# Patient Record
Sex: FEMALE | Race: WHITE | Employment: OTHER | URBAN - METROPOLITAN AREA
[De-identification: names, ages, dates, MRNs, and addresses within clinical notes are randomized per-mention and may not be internally consistent; named-entity substitution may affect disease eponyms.]

---

## 2020-06-01 ENCOUNTER — HOSPITAL ENCOUNTER (OUTPATIENT)
Dept: PHYSICAL THERAPY | Age: 75
Discharge: HOME | End: 2020-06-01
Payer: COMMERCIAL

## 2020-06-01 DIAGNOSIS — S93.402A SPRAIN OF LEFT ANKLE, UNSPECIFIED LIGAMENT, INITIAL ENCOUNTER: ICD-10-CM

## 2020-06-01 DIAGNOSIS — S83.412A SPRAIN OF MEDIAL COLLATERAL LIGAMENT OF LEFT KNEE, INITIAL ENCOUNTER: ICD-10-CM

## 2020-06-01 DIAGNOSIS — S16.1XXA STRAIN OF NECK MUSCLE, INITIAL ENCOUNTER: ICD-10-CM

## 2020-06-01 PROCEDURE — 97112 NEUROMUSCULAR REEDUCATION: CPT

## 2020-06-01 PROCEDURE — 97110 THERAPEUTIC EXERCISES: CPT

## 2020-06-01 PROCEDURE — 97162 PT EVAL MOD COMPLEX 30 MIN: CPT

## 2020-06-01 NOTE — PROGRESS NOTES
Tammie Geneva  : 1945  [facility]  Secondary:  [address] [facility] @ [address] [place] [address].  [phone]   [phone]      OUTPATIENT PHYSICAL THERAPY: Daily Treatment Note 2020  Visit Count:  1    ICD-10: Treatment Diagnosis: Pain in left wrist (M25.532) and Stiffness of left wrist, not elsewhere classified (M25.632), Pain in left knee (M25.562) and Pain in left ankle and joints of left foot (M25.572) and Cervicalgia (M54.2) and Stiffness of unspecified joint, not elsewhere classified (M25.60)  TREATMENT PLAN:  Effective Dates: 2020 TO 2020 (30 days). Frequency/Duration: 2 times a week for 30 Day(s)  GOALS: (Goals have been discussed and agreed upon with patient.)  Short-Term Functional Goals: Time Frame: 2 weeks  # Goal Status   1 Pt will confirm compliance with home program to facilitate improvement in function. NEW     Discharge goals: Time frame: 4 weeks  # Goal Status   1 Pt will be able to tolerate sitting for at least 1 hour with no significant increase in symptoms to participate in ADLs such as desk work and reading. NEW   2 Pt will be able to perform sit to stand transfer without symptoms to be more independent with mobility. NEW   3 Pt will be able to ascend/descend 1 flight of stairs without significant increase in symptoms to be independent with household and community mobility. NEW   4 Pt will be able to pick objects up from ground without significant increase in symptoms to be able to perform ADLs such as clean. NEW       Pre-treatment Symptoms/Complaints:  Pt reports she feels ok right now because she took her medication before coming in and has been moving around. Pain: Initial:   0/10 Post Session:  0/10   Medications Last Reviewed: 2020  Updated Objective Findings: Below measures from initial evaluation unless otherwise noted.   20  Pain at worst: 7/10  Observation: Edema visible effecting L medial knee. ROM: L ankle and knee [facility]. L cervical rotation limited to 30 ° due to L sided pain and R rotation limited to 50 ° due to L sided pain. Lower body ANDT: All tests WNL B  Strength: Pt requires UEs for STS but is able to perform several SL calf raises on L with some discomfort. She has difficulty with rolling in bed. Palpation: No TTP in L ankle, knee, and neck currently. NDI: 12/45    TREATMENT:   THERAPEUTIC ACTIVITY: (see chart for minutes): Therapeutic activities per grid below to improve mobility, strength, balance and coordination. Required minimal visual, verbal and tactile cues to improve independence with functional mobility and activities. THERAPEUTIC EXERCISE: (see chart for minutes):  Exercises per grid below to improve mobility, strength, balance and coordination. Required minimal visual, verbal and tactile cues to promote proper body alignment and promote proper body mechanics. Progressed resistance, range, repetitions and complexity of movement as indicated. NEUROMUSCULAR RE-EDUCATION: (see chart for minutes):  Exercise/activities per grid below to improve balance, coordination, kinesthetic sense, posture, pain, and proprioception. Required minimal visual, verbal and tactile cues to promote static and dynamic balance in standing. MANUAL THERAPY: (see chart for minutes): Joint mobilization, Soft tissue mobilization, skin mobilization, and muscle energy techniques were utilized and necessary because of the patient's restricted joint motion, restricted motion of soft tissue and pain . MODALITIES: (see chart for minutes): *  Hot Pack Therapy in order to provide analgesia.      Date: 6/1/20 (visit 1)       Modalities:                Therapeutic Exercise: 15 min        LTR: x5 B   Active RADHA on L: x5 for knee and ankle AROM  Cervical SB AROM: x5 B   Radial nerve mobilization: x5 with pt holding own thumb and performing pronation/supination  Pt educated on home program implementation and given printout. Neuromuscular re-education 10 min        Guided visualization performed to improve LE AROM. Manual Therapy:                Therapeutic Activities:                  Home program: 6/1/20: LTR, active RADHA, cervical SB AROM    Med[employer] Portal  Treatment/Session Summary:    · Response to Treatment: Pt reported she has done similar exercises years ago and that these exercises felt good. · Communication/Consultation:  None today  · Equipment provided today:  None today  · Recommendations/Intent for next treatment session: Next visit will focus on improving pain-free AROM of neck, L knee, L wrist, and L ankle.     Total Treatment Billable Duration:  45 min minutes  PT Patient Time In/Time Out  Time In: 7485  Time Out: Trix Arlene [address], PT, DPT    Future Appointments   Date Time Provider Arianne Persaud   6/5/2020 11:00 AM Don Dryer [facility]   6/8/2020 10:15 AM Don Dryer [facility]   6/12/2020  2:00 PM Don Dryer [facility]   6/15/2020  2:00 PM [facility]   6/19/2020  2:00 PM Don Dryer [facility]   6/22/2020  2:00 PM Don Dryer [facility]   6/26/2020  2:00 PM Don Dryer [facility]   6/29/2020  2:00 PM Don Dryer [facility]   7/17/2020  9:00 AM TRIM LAB RESOURCE TRIM TRIM   7/31/2020 11:00 AM Charles Moreland DO TRIM TRIM

## 2020-06-01 NOTE — THERAPY EVALUATION
Trenton Tracy  : 1945  Primary: Sc Dual [address]  Secondary:  Darion Carroll @ [address], [address] [place] [address]  [phone]   [phone]       OUTPATIENT PHYSICAL THERAPY:Initial Assessment 2020   ICD-10: Treatment Diagnosis: Pain in left wrist (M25.532) and Stiffness of left wrist, not elsewhere classified (M25.632), Pain in left knee (M25.562) and Pain in left ankle and joints of left foot (M25.572) and Cervicalgia (M54.2) and Stiffness of unspecified joint, not elsewhere classified (M25.60)  Precautions/Allergies:   Morphine and Statins-hmg-coa reductase inhibitors     Ambulatory/Rehab Services H2 Model Falls Risk Assessment    Risk Factors:       (1)  Visual Impairment [specify:  legally blind]       (5)  History of Recent Falls [w/in 3 months] Ability to Rise from Chair:       (1)  Pushes up, successful in one attempt    Falls Prevention Plan:       No modifications necessary   Total: (5 or greater = High Risk): 7     [facility] [employer]. All Rights Reserved. [facility] Patent #[id number]. Federal Law prohibits the replication, distribution or use without written permission from [employer]      MEDICAL/REFERRING DIAGNOSIS:  Strain of neck muscle, initial encounter [S16. 1XXA]  Sprain of medial collateral ligament of left knee, initial encounter [S83.412A]  Sprain of left ankle, unspecified ligament, initial encounter [S93.402A]   DATE OF ONSET: 20  REFERRING PHYSICIAN: Ken Alberto DO MD Orders: please evaluate patient for physical therapy  Return MD Appointment: 20   INITIAL ASSESSMENT:  Ms. Chloe Bennett presents with impaired strength, ROM and pain of neck, L wrist, L knee, and L ankle secondary to fall. This limits turning, reaching, lifting, bending and ambulatory tolerance and restricts ability to participate in ADLs and functional mobility. . Patient would benefit from skilled physical therapy to address above impairments. PROBLEM LIST (Impacting functional limitations):  1. Decreased Strength  2. Decreased ADL/Functional Activities  3. Decreased Transfer Abilities  4. Increased Pain  5. Decreased Activity Tolerance  6. Decreased Flexibility/Joint Mobility  7. Edema/Girth INTERVENTIONS PLANNED: (Treatment may consist of any combination of the following)  1. Cryotherapy  2. Electrical Stimulation  3. Gait Training  4. Heat  5. Home Exercise Program (HEP)  6. Manual Therapy  7. Neuromuscular Re-education/Strengthening  8. Therapeutic Activites  9. Therapeutic Exercise/Strengthening   TREATMENT PLAN:  Effective Dates: 6/1/2020 TO 7/1/2020 (30 days). Frequency/Duration: 2 times a week for 30 Day(s)  GOALS: (Goals have been discussed and agreed upon with patient.)  Short-Term Functional Goals: Time Frame: 2 weeks  # Goal Status   1 Pt will confirm compliance with home program to facilitate improvement in function. NEW     Discharge goals: Time frame: 4 weeks  # Goal Status   1 Pt will be able to tolerate sitting for at least 1 hour with no significant increase in symptoms to participate in ADLs such as desk work and reading. NEW   2 Pt will be able to perform sit to stand transfer without symptoms to be more independent with mobility. NEW   3 Pt will be able to ascend/descend 1 flight of stairs without significant increase in symptoms to be independent with household and community mobility. NEW   4 Pt will be able to pick objects up from ground without significant increase in symptoms to be able to perform ADLs such as clean. NEW       Rehabilitation Potential For Stated Goals: Good  Regarding Ardath Goo therapy, I certify that the treatment plan above will be carried out by a therapist or under their direction.   Thank you for this referral,  Avery Monique, PT, DPT     Referring Physician Signature: Charles Moreland DO _______________________________ Date _____________     HISTORY:   History of Injury/Illness (Reason for Referral):  Pt reports she fell in May after slipping on some leaves. She landed on her L side, hurting her L wrist, L knee, L ankle, and her neck. She reports that now she will intermittently have pain, but primary complaint is stiffness with prolonged inactivity which makes bed mobility and normal mobility painful and difficult. She denies any radicular symptoms. She reports she would like to get back to normal baseline which was no significant pain and ability to navigate stairs with reciprocal gait. Past Medical History/Comorbidities:   Ms. Peyton Jauregui  has a past medical history of Arthritis, Cancer ([address] Ear problems, Hearing reduced, Hypercholesterolemia, and MS (multiple sclerosis) ([facility] [address] Ms. Peyton Jauregui  has a past surgical history that includes hx hysterectomy and hx appendectomy. Social History/Living Environment:     Pt lives with family in two-story home with stairs in home and walk-in shower. Prior Level of Function/Work/Activity:  Pt is retired . Pt had unrestricted prior level of function. Current Medications:       Current Outpatient Medications:     doxycycline (MONODOX) 100 mg capsule, Take 1 Cap by mouth two (2) times a day., Disp: 20 Cap, Rfl: 0    ezetimibe (ZETIA) 10 mg tablet, Take 1 Tab by mouth daily. , Disp: 30 Tab, Rfl: 5    varicella-zoster recombinant, PF, (SHINGRIX, PF,) 50 mcg/0.5 mL susr injection, 0.5mL by IntraMUSCular route once now and then repeat in 2-6 months, Disp: 0.5 mL, Rfl: 1    glucosamine-chondroitin (ARTHX) 500-400 mg cap, Take 1 Cap by mouth daily. , Disp: , Rfl:     aspirin delayed-release 81 mg tablet, Take  by mouth daily. , Disp: , Rfl:     cholecalciferol, vitamin D3, (VITAMIN D3) 2,000 unit tab, Take  by mouth., Disp: , Rfl:     multivitamin (ONE A DAY) tablet, Take 1 Tab by mouth daily. , Disp: , Rfl:    Date Last Reviewed:  6/1/2020    Number of Personal Factors/Comorbidities that affect the Plan of Care: 1-2: MODERATE COMPLEXITY EXAMINATION:   Pain at worst: 7/10  Observation: Edema visible effecting L medial knee. ROM: L ankle and [facility]. L cervical rotation limited to 30 ° due to L sided pain and R rotation limited to 50 ° due to L sided pain. Lower body ANDT: All tests WNL B  Strength: Pt requires UEs for STS but is able to perform several SL calf raises on L with some discomfort. She has difficulty with rolling in bed. Palpation: No TTP in L ankle, knee, and neck currently. Body Structures Involved:  1. Nerves  2. Joints  3. Muscles Body Functions Affected:  1. Sensory/Pain  2. Neuromusculoskeletal  3. Movement Related Activities and Participation Affected:  1. General Tasks and Demands  2. Mobility  3. Self Care  4. Community, Social and [facility]   Number of elements (examined above) that affect the Plan of Care: 3: MODERATE COMPLEXITY   CLINICAL PRESENTATION:   Presentation: Evolving clinical presentation with changing clinical characteristics: MODERATE COMPLEXITY     CLINICAL DECISION MAKING:      OUTCOME MEASURE:   Initial outcome measure performed on 6/1/2020. Tool Used: Neck Disability Index (NDI)  Score:  Initial: 12/45  Most Recent: X/50 (Date: -- )   Interpretation of Score: The Neck Disability Index is a revised form of the Oswestry Low Back Pain Index and is designed to measure the activities of daily living in person's with neck pain. Each section is scored on a 0-5 scale, 5 representing the greatest disability. The scores of each section are added together for a total score of 50. MEDICAL NECESSITY:   · Patient will benefit from skilled physical therapy to address their previously listed impairments in order to improve their independence with functional activities painfree. REASON FOR SERVICES/OTHER COMMENTS:  · Patient requires present interventions due to patient's inability to perform functional and recreational activities painfree.    Use of outcome tool(s) and clinical judgement create a POC that gives a: Questionable prediction of patient's progress: MODERATE COMPLEXITY        Total Duration: 45 min  PT Patient Time In/Time Out  Time In: 6436  Time Out: [address], PT, DPT

## 2020-06-05 ENCOUNTER — HOSPITAL ENCOUNTER (OUTPATIENT)
Dept: PHYSICAL THERAPY | Age: 75
Discharge: HOME | End: 2020-06-05
Payer: COMMERCIAL

## 2020-06-08 ENCOUNTER — HOSPITAL ENCOUNTER (OUTPATIENT)
Dept: PHYSICAL THERAPY | Age: 75
Discharge: HOME | End: 2020-06-08
Payer: COMMERCIAL

## 2020-06-08 PROCEDURE — 97110 THERAPEUTIC EXERCISES: CPT

## 2020-06-08 NOTE — PROGRESS NOTES
Mary Ibarratamika  : 1945  Primary: [facility] Dual [place] [facility]  Secondary:  [address] @ [place] [address].  [phone]   [phone]      OUTPATIENT PHYSICAL THERAPY: Daily Treatment Note 2020  Visit Count:  2    ICD-10: Treatment Diagnosis: Pain in left wrist (M25.532) and Stiffness of left wrist, not elsewhere classified (M25.632), Pain in left knee (M25.562) and Pain in left ankle and joints of left foot (M25.572) and Cervicalgia (M54.2) and Stiffness of unspecified joint, not elsewhere classified (M25.60)  TREATMENT PLAN:  Effective Dates: 2020 TO 2020 (30 days). Frequency/Duration: 2 times a week for 30 Day(s)  GOALS: (Goals have been discussed and agreed upon with patient.)  Short-Term Functional Goals: Time Frame: 2 weeks  # Goal Status   1 Pt will confirm compliance with home program to facilitate improvement in function. NEW     Discharge goals: Time frame: 4 weeks  # Goal Status   1 Pt will be able to tolerate sitting for at least 1 hour with no significant increase in symptoms to participate in ADLs such as desk work and reading. NEW   2 Pt will be able to perform sit to stand transfer without symptoms to be more independent with mobility. NEW   3 Pt will be able to ascend/descend 1 flight of stairs without significant increase in symptoms to be independent with household and community mobility. NEW   4 Pt will be able to pick objects up from ground without significant increase in symptoms to be able to perform ADLs such as clean. NEW       Pre-treatment Symptoms/Complaints:  Pt arrived several minutes late today. She reports her L wrist, knee, and ankle still hurt but her neck has improved significantly.    Pain: Initial:   Pt reported no pain currently Post Session:  No increase    Medications Last Reviewed: 2020  Updated Objective Findings: Below measures from initial evaluation unless otherwise noted.  6/1/20  Pain at worst: 7/10  Observation: Edema visible effecting L medial knee. ROM: L ankle and knee ROM is [facility] currently. L cervical rotation limited to 30 ° due to L sided pain and R rotation limited to 50 ° due to L sided pain. Lower body ANDT: All tests WNL B  Strength: Pt requires UEs for STS but is able to perform several SL calf raises on L with some discomfort. She has difficulty with rolling in bed. Palpation: No TTP in L ankle, knee, and neck currently. NDI: 12/45    TREATMENT:   THERAPEUTIC ACTIVITY: (see chart for minutes): Therapeutic activities per grid below to improve mobility, strength, balance and coordination. Required minimal visual, verbal and tactile cues to improve independence with functional mobility and activities. THERAPEUTIC EXERCISE: (see chart for minutes):  Exercises per grid below to improve mobility, strength, balance and coordination. Required minimal visual, verbal and tactile cues to promote proper body alignment and promote proper body mechanics. Progressed resistance, range, repetitions and complexity of movement as indicated. NEUROMUSCULAR RE-EDUCATION: (see chart for minutes):  Exercise/activities per grid below to improve balance, coordination, kinesthetic sense, posture, pain, and proprioception. Required minimal visual, verbal and tactile cues to promote static and dynamic balance in standing. MANUAL THERAPY: (see chart for minutes): Joint mobilization, Soft tissue mobilization, skin mobilization, and muscle energy techniques were utilized and necessary because of the patient's restricted joint motion, restricted motion of soft tissue and pain . MODALITIES: (see chart for minutes): *  Hot Pack Therapy in order to provide analgesia.      Date: 6/1/20 (visit 1) 6/8/20 (visit 2)       Modalities:                Therapeutic Exercise: 15 min 35 min       LTR: x5 B   Active RADHA on L: x5 for knee and ankle AROM  Cervical SB AROM: x5 B   Radial nerve mobilization: x5 with pt holding own thumb and performing pronation/supination  Pt educated on home program implementation and given printout. SLR: 2x10 B  Alternating SL bridge: 2x5 B with no pain  S/L hip abduction: x10 L with no pain  S/L reverse clam: x10 L with no pain  Anterior weight shift: x10   Double leg calf raises: x10 knee straight and bent  SL calf raises: 2x10 knee straight with some difficulty on L and slight knee pain, x10 knee bent with no pain on L  Step-ups on foam: x10 B  Pt educated on updated home program      Neuromuscular re-education 10 min        Guided visualization performed to improve LE AROM. Manual Therapy:                Therapeutic Activities:                  Home program: 6/1/20: LTR, active RADHA, cervical SB AROM  6/8/20: cervical SB, SL calf raises     [employer] Portal  Treatment/Session Summary:    · Response to Treatment: Pt responded well to exercises and had greatest difficulty with L SL calf raises. She was given written instructions to work on this at home and she confirmed she would. · Communication/Consultation:  None today  · Equipment provided today:  None today  · Recommendations/Intent for next treatment session: Next visit will focus on improving pain-free AROM of neck, L knee, L wrist, and L ankle.     Total Treatment Billable Duration: 35 minutes  PT Patient Time In/Time Out  Time In: 1025  Time Out: [address] Chel Boudreaux, PT, DPT    Future Appointments   Date Time Provider Arianne Persaud   6/12/2020  2:00 PM Remi Obey [facility]   6/15/2020  2:00 PM Remi Obey [facility]   6/19/2020  2:00 PM Remi Obey [facility]   6/22/2020  2:00 PM Remi Obey [facility]   6/26/2020  2:00 PM Remi Obey [facility]   6/29/2020  2:00 PM Remi Obey [facility]   7/17/2020  9:00 AM TRIM LAB RESOURCE TRIM TRIM   7/31/2020 11:00 AM Marcela Carias,  TRIM TRIM

## 2020-06-12 ENCOUNTER — HOSPITAL ENCOUNTER (OUTPATIENT)
Dept: PHYSICAL THERAPY | Age: 75
Discharge: HOME | End: 2020-06-12
Payer: COMMERCIAL

## 2020-06-12 PROCEDURE — 97112 NEUROMUSCULAR REEDUCATION: CPT

## 2020-06-12 PROCEDURE — 97110 THERAPEUTIC EXERCISES: CPT

## 2020-06-12 NOTE — PROGRESS NOTES
Elvia Todd  : 1945  Primary: [facility] [place] [facility]  Secondary:  [address] @ [place] [address].  [phone]   [id number]      OUTPATIENT PHYSICAL THERAPY: Daily Treatment Note 2020  Visit Count:  3    ICD-10: Treatment Diagnosis: Pain in left wrist (M25.532) and Stiffness of left wrist, not elsewhere classified (M25.632), Pain in left knee (M25.562) and Pain in left ankle and joints of left foot (M25.572) and Cervicalgia (M54.2) and Stiffness of unspecified joint, not elsewhere classified (M25.60)  TREATMENT PLAN:  Effective Dates: 2020 TO 2020 (30 days). Frequency/Duration: 2 times a week for 30 Day(s)  GOALS: (Goals have been discussed and agreed upon with patient.)  Short-Term Functional Goals: Time Frame: 2 weeks  # Goal Status   1 Pt will confirm compliance with home program to facilitate improvement in function. NEW     Discharge goals: Time frame: 4 weeks  # Goal Status   1 Pt will be able to tolerate sitting for at least 1 hour with no significant increase in symptoms to participate in ADLs such as desk work and reading. NEW   2 Pt will be able to perform sit to stand transfer without symptoms to be more independent with mobility. NEW   3 Pt will be able to ascend/descend 1 flight of stairs without significant increase in symptoms to be independent with household and community mobility. NEW   4 Pt will be able to pick objects up from ground without significant increase in symptoms to be able to perform ADLs such as clean. NEW       Pre-treatment Symptoms/Complaints: Pt reports that she has good days and bad days which is better than before because she used to consistently have only bad days. She reports her ankle has been feeling better with the exercises.    Pain: Initial:   Pt reported no pain currently at rest Post Session:  No pain   Medications Last Reviewed: 2020  Updated Objective Findings: Below measures from initial evaluation unless otherwise noted. 6/1/20  Pain at worst: 7/10  Observation: Edema visible effecting L medial knee. ROM: L ankle and knee ROM is [facility] currently. L cervical rotation limited to 30 ° due to L sided pain and R rotation limited to 50 ° due to L sided pain. Lower body ANDT: All tests WNL B  Strength: Pt requires UEs for STS but is able to perform several SL calf raises on L with some discomfort. She has difficulty with rolling in bed. Palpation: No TTP in L ankle, knee, and neck currently. NDI: 12/45    TREATMENT:   THERAPEUTIC ACTIVITY: (see chart for minutes): Therapeutic activities per grid below to improve mobility, strength, balance and coordination. Required minimal visual, verbal and tactile cues to improve independence with functional mobility and activities. THERAPEUTIC EXERCISE: (see chart for minutes):  Exercises per grid below to improve mobility, strength, balance and coordination. Required minimal visual, verbal and tactile cues to promote proper body alignment and promote proper body mechanics. Progressed resistance, range, repetitions and complexity of movement as indicated. NEUROMUSCULAR RE-EDUCATION: (see chart for minutes):  Exercise/activities per grid below to improve balance, coordination, kinesthetic sense, posture, pain, and proprioception. Required minimal visual, verbal and tactile cues to promote static and dynamic balance in standing. MANUAL THERAPY: (see chart for minutes): Joint mobilization, Soft tissue mobilization, skin mobilization, and muscle energy techniques were utilized and necessary because of the patient's restricted joint motion, restricted motion of soft tissue and pain . MODALITIES: (see chart for minutes): *  Hot Pack Therapy in order to provide analgesia.      Date: 6/1/20 (visit 1) 6/8/20 (visit 2)  6/12/20 (visit 3)      Modalities:                Therapeutic Exercise: 15 min 35 min 30 min      LTR: x5 B Active RADHA on L: x5 for knee and ankle AROM  Cervical SB AROM: x5 B   Radial nerve mobilization: x5 with pt holding own thumb and performing pronation/supination  Pt educated on home program implementation and given printout. SLR: 2x10 B  Alternating SL bridge: 2x5 B with no pain  S/L hip abduction: x10 L with no pain  S/L reverse clam: x10 L with no pain  Anterior weight shift: x10   Double leg calf raises: x10 knee straight and bent  SL calf raises: 2x10 knee straight with some difficulty on L and slight knee pain, x10 knee bent with no pain on L  Step-ups on foam: x10 B  Pt educated on updated home program SL bridge: 2x5 B   LTR: x10  S/L hip abduction: x10 L  Reverse clam: x10 L  SL L calf raise: 2x10 B with improved performance on L  Mini lunges: 3x5 B   Pt educated on updated home program.      Neuromuscular re-education 10 min  13 min      Guided visualization performed to improve LE AROM. Pt guided through rogressive relaxation for entire body. She confirmed she felt very relaxed afterwards. She was instructed on performing on own at home using deep breaths and self-delivered verbal cue. She practiced this in clinic and confirmed she was able to relax deeply. Manual Therapy:                Therapeutic Activities:                t   Home program: 6/1/20: LTR, active RADHA, cervical SB AROM  6/8/20: cervical SB, SL calf raises   6/12/20: cervical SB, ball squeeze, mini lunges, SL calf raises, relaxation routine    [facility] [employer] Portal  Treatment/Session Summary:    · Response to Treatment: Pt responding well to physical therapy and confirms that she notices improvements. She demonstrated improved ankle strength. · Communication/Consultation:  None today  · Equipment provided today:  None today  · Recommendations/Intent for next treatment session: Next visit will focus on improving pain-free AROM of neck, L knee, L wrist, and L ankle.     Total Treatment Billable Duration: 43 minutes  PT Patient Time In/Time Out  Time In: 1400  Time Out: [address] [facility] Samra Cheney, PT, DPT    Future Appointments   Date Time Provider Arianne Persaud   6/15/2020  2:00 PM Marshal Ego [facility]   6/19/2020  2:00 PM Marshal Ego [facility]   6/22/2020  2:00 PM Marshal Ego [facility]   6/26/2020  2:00 PM Marshal Ego [facility]   6/29/2020  2:00 PM Marshal Ego [facility]   7/17/2020  9:00 AM TRIM LAB RESOURCE TRIM TRIM   7/31/2020 11:00 AM DO MEERA Min TRIM

## 2020-06-15 ENCOUNTER — HOSPITAL ENCOUNTER (OUTPATIENT)
Dept: PHYSICAL THERAPY | Age: 75
Discharge: HOME | End: 2020-06-15
Payer: COMMERCIAL

## 2020-06-19 ENCOUNTER — HOSPITAL ENCOUNTER (OUTPATIENT)
Dept: PHYSICAL THERAPY | Age: 75
Discharge: HOME | End: 2020-06-19
Payer: COMMERCIAL

## 2020-06-19 PROCEDURE — 97110 THERAPEUTIC EXERCISES: CPT

## 2020-06-19 PROCEDURE — 97140 MANUAL THERAPY 1/> REGIONS: CPT

## 2020-06-19 NOTE — PROGRESS NOTES
Trenton Molina  : 1945  Primary: [facility]  Secondary:  [address] @ [address], [address] [place] [address]  [phone]   [phone]      OUTPATIENT PHYSICAL THERAPY: Daily Treatment Note 2020  Visit Count:  4    ICD-10: Treatment Diagnosis: Pain in left wrist (M25.532) and Stiffness of left wrist, not elsewhere classified (M25.632), Pain in left knee (M25.562) and Pain in left ankle and joints of left foot (M25.572) and Cervicalgia (M54.2) and Stiffness of unspecified joint, not elsewhere classified (M25.60)  TREATMENT PLAN:  Effective Dates: 2020 TO 2020 (30 days). Frequency/Duration: 2 times a week for 30 Day(s)  GOALS: (Goals have been discussed and agreed upon with patient.)  Short-Term Functional Goals: Time Frame: 2 weeks  # Goal Status   1 Pt will confirm compliance with home program to facilitate improvement in function. NEW     Discharge goals: Time frame: 4 weeks  # Goal Status   1 Pt will be able to tolerate sitting for at least 1 hour with no significant increase in symptoms to participate in ADLs such as desk work and reading. NEW   2 Pt will be able to perform sit to stand transfer without symptoms to be more independent with mobility. NEW   3 Pt will be able to ascend/descend 1 flight of stairs without significant increase in symptoms to be independent with household and community mobility. NEW   4 Pt will be able to pick objects up from ground without significant increase in symptoms to be able to perform ADLs such as clean. NEW       Pre-treatment Symptoms/Complaints: Pt arrived 14 minutes late today. She reports that her knee and wrist has been bothering her more but her ankle has been feeling ok. Pain: Initial:   2/10  Post Session:  No pain   Medications Last Reviewed: 2020  Updated Objective Findings: Below measures from initial evaluation unless otherwise noted.   20  Pain at worst: 7/10  Observation: Edema visible effecting L medial knee. ROM: L ankle and knee ROM is [facility] currently. L cervical rotation limited to 30 ° due to L sided pain and R rotation limited to 50 ° due to L sided pain. Lower body ANDT: All tests WNL B  Strength: Pt requires UEs for STS but is able to perform several SL calf raises on L with some discomfort. She has difficulty with rolling in bed. Palpation: No TTP in L ankle, knee, and neck currently. NDI: 12/45    TREATMENT:   THERAPEUTIC ACTIVITY: (see chart for minutes): Therapeutic activities per grid below to improve mobility, strength, balance and coordination. Required minimal visual, verbal and tactile cues to improve independence with functional mobility and activities. THERAPEUTIC EXERCISE: (see chart for minutes):  Exercises per grid below to improve mobility, strength, balance and coordination. Required minimal visual, verbal and tactile cues to promote proper body alignment and promote proper body mechanics. Progressed resistance, range, repetitions and complexity of movement as indicated. NEUROMUSCULAR RE-EDUCATION: (see chart for minutes):  Exercise/activities per grid below to improve balance, coordination, kinesthetic sense, posture, pain, and proprioception. Required minimal visual, verbal and tactile cues to promote static and dynamic balance in standing. MANUAL THERAPY: (see chart for minutes): Joint mobilization, Soft tissue mobilization, skin mobilization, and muscle energy techniques were utilized and necessary because of the patient's restricted joint motion, restricted motion of soft tissue and pain . MODALITIES: (see chart for minutes): *  Hot Pack Therapy in order to provide analgesia.      Date: 6/1/20 (visit 1) 6/8/20 (visit 2)  6/12/20 (visit 3)  6/19/20 (visit 4)     Modalities:                Therapeutic Exercise: 15 min 35 min 30 min 34 min     LTR: x5 B   Active RADHA on L: x5 for knee and ankle AROM  Cervical SB AROM: x5 B   Radial nerve mobilization: x5 with pt holding own thumb and performing pronation/supination  Pt educated on home program implementation and given printout. SLR: 2x10 B  Alternating SL bridge: 2x5 B with no pain  S/L hip abduction: x10 L with no pain  S/L reverse clam: x10 L with no pain  Anterior weight shift: x10   Double leg calf raises: x10 knee straight and bent  SL calf raises: 2x10 knee straight with some difficulty on L and slight knee pain, x10 knee bent with no pain on L  Step-ups on foam: x10 B  Pt educated on updated home program SL bridge: 2x5 B   LTR: x10  S/L hip abduction: x10 L  Reverse clam: x10 L  SL L calf raise: 2x10 B with improved performance on L  Mini lunges: 3x5 B   Pt educated on updated home program.  SLR: x10 B   SL bridge: x10 B   L hip abduction: 2x10   L reverse clam: 2x10   SL calf raise: x10 B   Calf contract-relax with knee bent: 2x3 L with improved DF afterwards   Lunges: 2x6 B   Step-ups on 4\": x5 B anterior, Heel tap off 4\": x5 B laterally, 2x5 B anteriorly   Ascend/descend 1 flight of stairs: pt able to ascend without difficulty but had difficulty descending leading with R LE due to lack of L DF  Twist with thera-bar: x5 both directions     Neuromuscular re-education 10 min  13 min      Guided visualization performed to improve LE AROM. Pt guided through rogressive relaxation for entire body. She confirmed she felt very relaxed afterwards. She was instructed on performing on own at home using deep breaths and self-delivered verbal cue. She practiced this in clinic and confirmed she was able to relax deeply.       Manual Therapy:    10 min        MWM with L DF with knee bent: x3 on step, x5 on stairs  Skin mobilization to L anterior ankle  Traction to L CMC and thumb: x2'     Therapeutic Activities:                t   Home program: 6/1/20: LTR, active RADHA, cervical SB AROM  6/8/20: cervical SB, SL calf raises   6/12/20: cervical SB, ball squeeze, mini lunges, SL calf raises, relaxation routine  6/19/20: updated leg exercises to L ankle contract-relax stretch and lunges     [facility] Portal  Treatment/Session Summary:    · Response to Treatment: Pt able to perform all mat exercises without difficulty or increase in pain. She progressed well with standing exercises, with limitation of L DF that improved with stretch and manual therapy. · Communication/Consultation:  None today  · Equipment provided today:  None today  · Recommendations/Intent for next treatment session: Next visit will focus on improving pain-free AROM of neck, L knee, L wrist, and L ankle.     Total Treatment Billable Duration: 44 minutes  PT Patient Time In/Time Out  Time In: 6776  Time Out: Whitney Mays, PT, DPT    Future Appointments   Date Time Provider Arianne Persaud   6/22/2020  2:00 PM Josse Vickers [facility]   6/26/2020  2:00 PM Josse LEONARDOASHELY [facility]   6/29/2020  2:00 PM Josse Vickers [facility]   7/1/2020  2:00 PM Josse LEONARDOOFASHELY [facility]   7/17/2020  9:00 AM TRIM LAB RESOURCE TRIM TRIM   7/31/2020 11:00 AM Katty Cota DO TRIM TRIM

## 2020-06-22 ENCOUNTER — HOSPITAL ENCOUNTER (OUTPATIENT)
Dept: PHYSICAL THERAPY | Age: 75
Discharge: HOME | End: 2020-06-22
Payer: COMMERCIAL

## 2020-06-22 PROCEDURE — 97110 THERAPEUTIC EXERCISES: CPT

## 2020-06-22 NOTE — PROGRESS NOTES
Lloyd Knox  : 1945  Primary: [facility] [address]  Secondary:  [address] [facility] @ [address], [address] [place] [address]  [phone]   [phone]      OUTPATIENT PHYSICAL THERAPY: Daily Treatment Note 2020  Visit Count:  5    ICD-10: Treatment Diagnosis: Pain in left wrist (M25.532) and Stiffness of left wrist, not elsewhere classified (M25.632), Pain in left knee (M25.562) and Pain in left ankle and joints of left foot (M25.572) and Cervicalgia (M54.2) and Stiffness of unspecified joint, not elsewhere classified (M25.60)  TREATMENT PLAN:  Effective Dates: 2020 TO 2020 (30 days). Frequency/Duration: 2 times a week for 30 Day(s)  GOALS: (Goals have been discussed and agreed upon with patient.)  Short-Term Functional Goals: Time Frame: 2 weeks  # Goal Status   1 Pt will confirm compliance with home program to facilitate improvement in function. MET     Discharge goals: Time frame: 4 weeks  # Goal Status   1 Pt will be able to tolerate sitting for at least 1 hour with no significant increase in symptoms to participate in ADLs such as desk work and reading. NEW   2 Pt will be able to perform sit to stand transfer without symptoms to be more independent with mobility. MET   3 Pt will be able to ascend/descend 1 flight of stairs without significant increase in symptoms to be independent with household and community mobility. MET   4 Pt will be able to pick objects up from ground without significant increase in symptoms to be able to perform ADLs such as clean. NEW       Pre-treatment Symptoms/Complaints: Pt reports she has been doing significantly better. She worked on her exercises at home and they went well and she worked on stairs. She reports less stiffness after prolonged sitting. She was able to descend stairs into clinic today with reciprocal gait.    Pain: Initial:   Pt reported no pain Post Session:  No pain   Medications Last Reviewed: 6/22/2020  Updated Objective Findings: Below measures from initial evaluation unless otherwise noted. 6/1/20  Pain at worst: 7/10  Observation: Edema visible effecting L medial knee. ROM: L ankle and knee ROM is [facility] currently. L cervical rotation limited to 30 ° due to L sided pain and R rotation limited to 50 ° due to L sided pain. Lower body ANDT: All tests WNL B  Strength: Pt requires UEs for STS but is able to perform several SL calf raises on L with some discomfort. She has difficulty with rolling in bed. Palpation: No TTP in L ankle, knee, and neck currently. NDI: 12/45    TREATMENT:   THERAPEUTIC ACTIVITY: (see chart for minutes): Therapeutic activities per grid below to improve mobility, strength, balance and coordination. Required minimal visual, verbal and tactile cues to improve independence with functional mobility and activities. THERAPEUTIC EXERCISE: (see chart for minutes):  Exercises per grid below to improve mobility, strength, balance and coordination. Required minimal visual, verbal and tactile cues to promote proper body alignment and promote proper body mechanics. Progressed resistance, range, repetitions and complexity of movement as indicated. NEUROMUSCULAR RE-EDUCATION: (see chart for minutes):  Exercise/activities per grid below to improve balance, coordination, kinesthetic sense, posture, pain, and proprioception. Required minimal visual, verbal and tactile cues to promote static and dynamic balance in standing. MANUAL THERAPY: (see chart for minutes): Joint mobilization, Soft tissue mobilization, skin mobilization, and muscle energy techniques were utilized and necessary because of the patient's restricted joint motion, restricted motion of soft tissue and pain . MODALITIES: (see chart for minutes): *  Hot Pack Therapy in order to provide analgesia.      Date: 6/1/20 (visit 1) 6/8/20 (visit 2)  6/12/20 (visit 3)  6/19/20 (visit 4)  6/22/20 (visit 5) Modalities:                Therapeutic Exercise: 15 min 35 min 30 min 34 min 40 min    LTR: x5 B   Active RADHA on L: x5 for knee and ankle AROM  Cervical SB AROM: x5 B   Radial nerve mobilization: x5 with pt holding own thumb and performing pronation/supination  Pt educated on home program implementation and given printout. SLR: 2x10 B  Alternating SL bridge: 2x5 B with no pain  S/L hip abduction: x10 L with no pain  S/L reverse clam: x10 L with no pain  Anterior weight shift: x10   Double leg calf raises: x10 knee straight and bent  SL calf raises: 2x10 knee straight with some difficulty on L and slight knee pain, x10 knee bent with no pain on L  Step-ups on foam: x10 B  Pt educated on updated home program SL bridge: 2x5 B   LTR: x10  S/L hip abduction: x10 L  Reverse clam: x10 L  SL L calf raise: 2x10 B with improved performance on L  Mini lunges: 3x5 B   Pt educated on updated home program.  SLR: x10 B   SL bridge: x10 B   L hip abduction: 2x10   L reverse clam: 2x10   SL calf raise: x10 B   Calf contract-relax with knee bent: 2x3 L with improved DF afterwards   Lunges: 2x6 B   Step-ups on 4\": x5 B anterior, Heel tap off 4\": x5 B laterally, 2x5 B anteriorly   Ascend/descend 1 flight of stairs: pt able to ascend without difficulty but had difficulty descending leading with R LE due to lack of L DF  Twist with thera-bar: x5 both directions  SLR: 2x10 B  SL bridge: 2x10 B   L hip abduction: 2x10 B   SL calf raises: 2x10 B   Calf stretch with knee bent: 2x5 B with no pain  Step-ups on foam: x10 B with no instability   Step-ups: x10 on 4\" B, x10 on 6\" with no pain  Stairs: pt ascended/descended 1 flight of stairs with reciprocal gait x2. Neck SB stretch: x5 B  STS: x5 from moderate height, x5 from lowest height   Neuromuscular re-education 10 min  13 min      Guided visualization performed to improve LE AROM. Pt guided through rogressive relaxation for entire body.  She confirmed she felt very relaxed afterwards. She was instructed on performing on own at home using deep breaths and self-delivered verbal cue. She practiced this in clinic and confirmed she was able to relax deeply. Manual Therapy:    10 min        MWM with L DF with knee bent: x3 on step, x5 on stairs  Skin mobilization to L anterior ankle  Traction to L CMC and thumb: x2'     Therapeutic Activities:                t   Home program: 6/1/20: LTR, active RADHA, cervical SB AROM  6/8/20: cervical SB, SL calf raises   6/12/20: cervical SB, ball squeeze, mini lunges, SL calf raises, relaxation routine  6/19/20: updated leg exercises to L ankle contract-relax stretch and lunges     [facility] Portal  Treatment/Session Summary:    · Response to Treatment: Pt demonstrates significant improvements today in pain, ankle and knee pain-free AROM, strength, and functional mobility such as stair navigation. · Communication/Consultation:  None today  · Equipment provided today:  None today  · Recommendations/Intent for next treatment session: Next visit will focus on improving pain-free AROM of neck, L knee, L wrist, and L ankle.     Total Treatment Billable Duration: 40 minutes  PT Patient Time In/Time Out  Time In: 1400  Time Out: [address] Aris Mccray, PT, DPT    Future Appointments   Date Time Provider Arianne Persaud   6/26/2020  2:00 PM Don Dryer [facility]   6/29/2020  2:00 PM Don Dryer [facility]   7/1/2020  2:00 PM Don Dryer [facility]   7/17/2020  9:00 AM TRIM LAB RESOURCE TRIM TRIM   7/31/2020 11:00 AM DO MEERA Davey TRIM

## 2020-06-26 ENCOUNTER — HOSPITAL ENCOUNTER (OUTPATIENT)
Dept: PHYSICAL THERAPY | Age: 75
Discharge: HOME | End: 2020-06-26
Payer: COMMERCIAL

## 2020-06-26 PROCEDURE — 97112 NEUROMUSCULAR REEDUCATION: CPT

## 2020-06-26 PROCEDURE — 97110 THERAPEUTIC EXERCISES: CPT

## 2020-06-26 NOTE — PROGRESS NOTES
Tammie Bean  : 1945  Primary: [employer] [address]  Secondary:  [address] @ [place] [address].  [phone]   [phone]      OUTPATIENT PHYSICAL THERAPY: Daily Treatment Note 2020  Visit Count:  6    ICD-10: Treatment Diagnosis: Pain in left wrist (M25.532) and Stiffness of left wrist, not elsewhere classified (M25.632), Pain in left knee (M25.562) and Pain in left ankle and joints of left foot (M25.572) and Cervicalgia (M54.2) and Stiffness of unspecified joint, not elsewhere classified (M25.60)  TREATMENT PLAN:  Effective Dates: 2020 TO 2020 (30 days). Frequency/Duration: 2 times a week for 30 Day(s)  GOALS: (Goals have been discussed and agreed upon with patient.)  Short-Term Functional Goals: Time Frame: 2 weeks  # Goal Status   1 Pt will confirm compliance with home program to facilitate improvement in function. MET     Discharge goals: Time frame: 4 weeks  # Goal Status   1 Pt will be able to tolerate sitting for at least 1 hour with no significant increase in symptoms to participate in ADLs such as desk work and reading. NEW   2 Pt will be able to perform sit to stand transfer without symptoms to be more independent with mobility. MET   3 Pt will be able to ascend/descend 1 flight of stairs without significant increase in symptoms to be independent with household and community mobility. MET   4 Pt will be able to pick objects up from ground without significant increase in symptoms to be able to perform ADLs such as clean. NEW       Pre-treatment Symptoms/Complaints: Pt reports she had a really good day on Monday but then was very stiff on Tuesday and Wednesday. She says that she has been doing exercises even though she was very stiff.   Pain: Initial:   Pt reported no pain but stiffness  Post Session:  No pain   Medications Last Reviewed: 2020  Updated Objective Findings: Below measures from initial evaluation unless otherwise noted. 6/1/20  Pain at worst: 7/10  Observation: Edema visible effecting L medial knee. ROM: L ankle and knee ROM is [facility] currently. L cervical rotation limited to 30 ° due to L sided pain and R rotation limited to 50 ° due to L sided pain. Lower body ANDT: All tests WNL B  Strength: Pt requires UEs for STS but is able to perform several SL calf raises on L with some discomfort. She has difficulty with rolling in bed. Palpation: No TTP in L ankle, knee, and neck currently. NDI: 12/45    TREATMENT:   THERAPEUTIC ACTIVITY: (see chart for minutes): Therapeutic activities per grid below to improve mobility, strength, balance and coordination. Required minimal visual, verbal and tactile cues to improve independence with functional mobility and activities. THERAPEUTIC EXERCISE: (see chart for minutes):  Exercises per grid below to improve mobility, strength, balance and coordination. Required minimal visual, verbal and tactile cues to promote proper body alignment and promote proper body mechanics. Progressed resistance, range, repetitions and complexity of movement as indicated. NEUROMUSCULAR RE-EDUCATION: (see chart for minutes):  Exercise/activities per grid below to improve balance, coordination, kinesthetic sense, posture, pain, and proprioception. Required minimal visual, verbal and tactile cues to promote static and dynamic balance in standing. MANUAL THERAPY: (see chart for minutes): Joint mobilization, Soft tissue mobilization, skin mobilization, and muscle energy techniques were utilized and necessary because of the patient's restricted joint motion, restricted motion of soft tissue and pain . MODALITIES: (see chart for minutes): *  Hot Pack Therapy in order to provide analgesia.      Date: 6/22/20 (visit 5) 6/26/20 (visit 6)       Modalities:                Therapeutic Exercise: 40 min 30 min       SLR: 2x10 B  SL bridge: 2x10 B   L hip abduction: 2x10 B   SL calf raises: 2x10 B   Calf stretch with knee bent: 2x5 B with no pain  Step-ups on foam: x10 B with no instability   Step-ups: x10 on 4\" B, x10 on 6\" with no pain  Stairs: pt ascended/descended 1 flight of stairs with reciprocal gait x2. Neck SB stretch: x5 B  STS: x5 from moderate height, x5 from lowest height SLR: 2x10 B  SL bridge: 2x10 B   Hip abduction: 2x10 B   SL calf raises: 2x10 B   Calf stretch with knee bent: x5 B with no pain  Calf stretch knee straight: x1 hold for L  Stairs: pt ascended/descended 1 flight of stairs with reciprocal gait x2. Neuromuscular re-education  10 min        Progressive relaxation and guided visualization for pain modulation, relaxation, and improving ROM. Afterwards, stair navigation was much better. Pt was educated on performing this at home over the weekend. Manual Therapy:                Therapeutic Activities:                t   Home program: 6/1/20: LTR, active RADHA, cervical SB AROM  6/8/20: cervical SB, SL calf raises   6/12/20: cervical SB, ball squeeze, mini lunges, SL calf raises, relaxation routine  6/19/20: updated leg exercises to L ankle contract-relax stretch and lunges     Med[employer] Portal  Treatment/Session Summary:    · Response to Treatment: Pt responded well to exercises and relaxation intervention. She reported she felt much looser afterwards. · Communication/Consultation:  None today  · Equipment provided today:  None today  · Recommendations/Intent for next treatment session: Next visit will focus on improving pain-free AROM of neck, L knee, L wrist, and L ankle.     Total Treatment Billable Duration: 40 minutes  PT Patient Time In/Time Out  Time In: 1400  Time Out: 800 Justin Reed, PT, DPT    Future Appointments   Date Time Provider Arianne Persaud   6/29/2020  2:00 PM [facility]   7/1/2020  2:00 PM [facility]   7/17/2020  9:00 AM TRIM LAB RESOURCE TRIM TRIM   7/31/2020 11:00 AM DO MEERA Mcdonald TRIM

## 2020-06-29 ENCOUNTER — HOSPITAL ENCOUNTER (OUTPATIENT)
Dept: PHYSICAL THERAPY | Age: 75
Discharge: HOME | End: 2020-06-29
Payer: COMMERCIAL

## 2020-06-29 PROCEDURE — 97110 THERAPEUTIC EXERCISES: CPT

## 2020-06-29 PROCEDURE — 97140 MANUAL THERAPY 1/> REGIONS: CPT

## 2020-06-29 NOTE — PROGRESS NOTES
I am accessing Ms. Citlaly Pacheco chart as a part of our department's internal chart auditing process. I certify that Ms. Maverick Shepard is, or was, a patient in our department.   Thank you,  Alma Cid, DPT  6/29/2020

## 2020-06-29 NOTE — PROGRESS NOTES
Cherri Vyas  : 1945  Primary: [employer] [address]  Secondary:  Uma Cadet @ [address] [place].  [phone]   [phone]      OUTPATIENT PHYSICAL THERAPY: Daily Treatment Note 2020  Visit Count:  7    ICD-10: Treatment Diagnosis: Pain in left wrist (M25.532) and Stiffness of left wrist, not elsewhere classified (M25.632), Pain in left knee (M25.562) and Pain in left ankle and joints of left foot (M25.572) and Cervicalgia (M54.2) and Stiffness of unspecified joint, not elsewhere classified (M25.60)  TREATMENT PLAN:  Effective Dates: 2020 TO 2020 (30 days). Frequency/Duration: 2 times a week for 30 Day(s)  GOALS: (Goals have been discussed and agreed upon with patient.)  Short-Term Functional Goals: Time Frame: 2 weeks  # Goal Status   1 Pt will confirm compliance with home program to facilitate improvement in function. MET     Discharge goals: Time frame: 4 weeks  # Goal Status   1 Pt will be able to tolerate sitting for at least 1 hour with no significant increase in symptoms to participate in ADLs such as desk work and reading. NEW   2 Pt will be able to perform sit to stand transfer without symptoms to be more independent with mobility. MET   3 Pt will be able to ascend/descend 1 flight of stairs without significant increase in symptoms to be independent with household and community mobility. MET   4 Pt will be able to pick objects up from ground without significant increase in symptoms to be able to perform ADLs such as clean. NEW       Pre-treatment Symptoms/Complaints: Pt reported she continued to have good days and bad days. She reports yesterday she had more ankle and knee pain. She reports in general her wrist has improved with most tasks.    Pain: Initial:   Pt reported she had pain in ankle/knee but did not rate pain Post Session:  No pain   Medications Last Reviewed: 2020  Updated Objective Findings: Below measures from initial evaluation unless otherwise noted. 6/1/20  Pain at worst: 7/10  Observation: Edema visible effecting L medial knee. ROM: L ankle and knee ROM is [facility] currently. L cervical rotation limited to 30 ° due to L sided pain and R rotation limited to 50 ° due to L sided pain. Lower body ANDT: All tests WNL B  Strength: Pt requires UEs for STS but is able to perform several SL calf raises on L with some discomfort. She has difficulty with rolling in bed. Palpation: No TTP in L ankle, knee, and neck currently. NDI: 12/45    TREATMENT:   THERAPEUTIC ACTIVITY: (see chart for minutes): Therapeutic activities per grid below to improve mobility, strength, balance and coordination. Required minimal visual, verbal and tactile cues to improve independence with functional mobility and activities. THERAPEUTIC EXERCISE: (see chart for minutes):  Exercises per grid below to improve mobility, strength, balance and coordination. Required minimal visual, verbal and tactile cues to promote proper body alignment and promote proper body mechanics. Progressed resistance, range, repetitions and complexity of movement as indicated. NEUROMUSCULAR RE-EDUCATION: (see chart for minutes):  Exercise/activities per grid below to improve balance, coordination, kinesthetic sense, posture, pain, and proprioception. Required minimal visual, verbal and tactile cues to promote static and dynamic balance in standing. MANUAL THERAPY: (see chart for minutes): Joint mobilization, Soft tissue mobilization, skin mobilization, and muscle energy techniques were utilized and necessary because of the patient's restricted joint motion, restricted motion of soft tissue and pain . MODALITIES: (see chart for minutes): *  Hot Pack Therapy in order to provide analgesia.      Date: 6/22/20 (visit 5) 6/26/20 (visit 6)  6/29/20 (visit 7)      Modalities:                Therapeutic Exercise: 40 min 30 min 33 min      SLR: 2x10 B  SL bridge: 2x10 B   L hip abduction: 2x10 B   SL calf raises: 2x10 B   Calf stretch with knee bent: 2x5 B with no pain  Step-ups on foam: x10 B with no instability   Step-ups: x10 on 4\" B, x10 on 6\" with no pain  Stairs: pt ascended/descended 1 flight of stairs with reciprocal gait x2. Neck SB stretch: x5 B  STS: x5 from moderate height, x5 from lowest height SLR: 2x10 B  SL bridge: 2x10 B   Hip abduction: 2x10 B   SL calf raises: 2x10 B   Calf stretch with knee bent: x5 B with no pain  Calf stretch knee straight: x1 hold for L  Stairs: pt ascended/descended 1 flight of stairs with reciprocal gait x2. SLR: 2x10 B  SL bridge: 2x10 B   Hip abduction: 2x10 L  Hip IR: 2x10 L  SL calf raises: 2x10 B   Stairs: pt ascended/descended 1 flight of stairs with reciprocal gait x1 after taping   STS: x5 from medium height, 2x5 from lowest mat height with no pain after taping      Neuromuscular re-education  10 min        Progressive relaxation and guided visualization for pain modulation, relaxation, and improving ROM. Afterwards, stair navigation was much better. Pt was educated on performing this at home over the weekend. Manual Therapy:   10 min        Skin MWM applied to L ankle in direction of ER while pt performed 2-3 reps of SL calf raise. She reported significant improvement. She was educated on taping and agreed to have tape applied to L ankle. She denied any allergies or adverse reactions to adhesives. She was educated to remove tape if needed. Cover-all tape and 4 strips of leuko tape applied to anterior ankle with slight tension for skin drag in lateral direction. She reported STS and stairs felt much better afterwards.       Therapeutic Activities:                t   Home program: 6/1/20: LTR, active RADHA, cervical SB AROM  6/8/20: cervical SB, SL calf raises   6/12/20: cervical SB, ball squeeze, mini lunges, SL calf raises, relaxation routine  6/19/20: updated leg exercises to L ankle contract-relax stretch and lunges     [facility] Portal  Treatment/Session Summary:    · Response to Treatment: Pt has responded sporadically to physical therapy with report of some days being good and high function and other days having more pain. She responded well to ankle taping and reported no pain with STS and descending stairs which were painful when she arrived. Will assess response next visit. · Communication/Consultation:  None today  · Equipment provided today:  None today  · Recommendations/Intent for next treatment session: Next visit will focus on improving pain-free AROM of neck, L knee, L wrist, and L ankle.     Total Treatment Billable Duration: 43 minutes  PT Patient Time In/Time Out  Time In: 1400  Time Out: 88 [facility] Blake Tompkins, PT, DPT    Future Appointments   Date Time Provider Arianne Persaud   7/1/2020  2:00 PM Rashard Osullivan [facility]   7/17/2020  9:00 AM TRIM LAB RESOURCE TRIM TRIM   7/31/2020 11:00 AM DO MEERA Philippe TRIM

## 2020-07-01 ENCOUNTER — HOSPITAL ENCOUNTER (OUTPATIENT)
Dept: PHYSICAL THERAPY | Age: 75
Discharge: HOME | End: 2020-07-01

## 2020-07-08 ENCOUNTER — HOSPITAL ENCOUNTER (OUTPATIENT)
Dept: PHYSICAL THERAPY | Age: 75
Discharge: HOME | End: 2020-07-08

## 2020-07-10 ENCOUNTER — APPOINTMENT (OUTPATIENT)
Dept: PHYSICAL THERAPY | Age: 75
End: 2020-07-10

## 2020-07-13 ENCOUNTER — APPOINTMENT (OUTPATIENT)
Dept: PHYSICAL THERAPY | Age: 75
End: 2020-07-13

## 2020-07-17 ENCOUNTER — APPOINTMENT (OUTPATIENT)
Dept: PHYSICAL THERAPY | Age: 75
End: 2020-07-17

## 2020-07-20 ENCOUNTER — APPOINTMENT (OUTPATIENT)
Dept: PHYSICAL THERAPY | Age: 75
End: 2020-07-20

## 2020-07-22 ENCOUNTER — APPOINTMENT (OUTPATIENT)
Dept: PHYSICAL THERAPY | Age: 75
End: 2020-07-22

## 2020-07-27 ENCOUNTER — APPOINTMENT (OUTPATIENT)
Dept: PHYSICAL THERAPY | Age: 75
End: 2020-07-27

## 2020-07-29 ENCOUNTER — APPOINTMENT (OUTPATIENT)
Dept: PHYSICAL THERAPY | Age: 75
End: 2020-07-29

## 2020-08-04 NOTE — PROGRESS NOTES
Lexis Travon  : 1945  Primary: [facility]  Secondary:  Rachelle Erickson @ [address], [address] [place] [address]  [phone]   [phone]      OUTPATIENT PHYSICAL THERAPY: Discharge Note     ICD-10: Treatment Diagnosis: Pain in left wrist (M25.532) and Stiffness of left wrist, not elsewhere classified (M25.632), Pain in left knee (M25.562) and Pain in left ankle and joints of left foot (M25.572) and Cervicalgia (M54.2) and Stiffness of unspecified joint, not elsewhere classified (M25.60)  TREATMENT PLAN:  Effective Dates: 2020 TO 2020 (30 days). Frequency/Duration: 2 times a week for 30 Day(s)  GOALS: (Goals have been discussed and agreed upon with patient.)  Short-Term Functional Goals: Time Frame: 2 weeks  # Goal Status   1 Pt will confirm compliance with home program to facilitate improvement in function. MET     Discharge goals: Time frame: 4 weeks  # Goal Status   1 Pt will be able to tolerate sitting for at least 1 hour with no significant increase in symptoms to participate in ADLs such as desk work and reading. UNABLE TO ASSESS   2 Pt will be able to perform sit to stand transfer without symptoms to be more independent with mobility. MET   3 Pt will be able to ascend/descend 1 flight of stairs without significant increase in symptoms to be independent with household and community mobility. MET   4 Pt will be able to pick objects up from ground without significant increase in symptoms to be able to perform ADLs such as clean. UNABLE TO ASSESS       · Pt had to cancel appointments due to being sick. She was last seen on 20 where she reported she had made improvements in function but continued to have sporadic flare-ups in pain. She is being discharged at this time due to not being seen in over a month.  If she contacts clinic to continue physical therapy, she will be informed to obtain referral from MD and she will be evaluated again at that time if appropriate.      Abran Mccray, PT, DPT

## 2020-12-03 ENCOUNTER — HOSPITAL ENCOUNTER (OUTPATIENT)
Dept: GENERAL RADIOLOGY | Age: 75
Discharge: HOME | End: 2020-12-03
Payer: COMMERCIAL

## 2020-12-03 PROCEDURE — 73130 X-RAY EXAM OF HAND: CPT

## 2022-04-20 ENCOUNTER — TRANSCRIBE ORDER (OUTPATIENT)
Dept: SCHEDULING | Age: 77
End: 2022-04-20

## 2022-04-20 DIAGNOSIS — R79.89 HYPOURICEMIA: ICD-10-CM

## 2022-04-20 DIAGNOSIS — R10.13 EPIGASTRIC PAIN: Primary | ICD-10-CM

## 2022-04-27 ENCOUNTER — HOSPITAL ENCOUNTER (OUTPATIENT)
Dept: CT IMAGING | Age: 77
Discharge: HOME | End: 2022-04-27
Attending: INTERNAL MEDICINE
Payer: COMMERCIAL

## 2022-04-27 DIAGNOSIS — R74.8 INCREASED LIVER ENZYMES: ICD-10-CM

## 2022-04-27 DIAGNOSIS — K75.9 HEPATITIS: ICD-10-CM

## 2022-04-27 LAB — CREAT BLD-MCNC: 0.76 MG/DL (ref 0.8–1.5)

## 2022-04-27 PROCEDURE — 74011000258 HC RX REV CODE- 258: Performed by: INTERNAL MEDICINE

## 2022-04-27 PROCEDURE — 74011000636 HC RX REV CODE- 636: Performed by: INTERNAL MEDICINE

## 2022-04-27 PROCEDURE — 74177 CT ABD & PELVIS W/CONTRAST: CPT

## 2022-04-27 PROCEDURE — 82565 ASSAY OF CREATININE: CPT

## 2022-04-27 RX ORDER — SODIUM CHLORIDE 0.9 % (FLUSH) 0.9 %
10 SYRINGE (ML) INJECTION
Status: COMPLETED | OUTPATIENT
Start: 2022-04-27 | End: 2022-04-27

## 2022-04-27 RX ADMIN — IOPAMIDOL 100 ML: 755 INJECTION, SOLUTION INTRAVENOUS at 13:58

## 2022-04-27 RX ADMIN — DIATRIZOATE MEGLUMINE AND DIATRIZOATE SODIUM 15 ML: 660; 100 LIQUID ORAL; RECTAL at 13:59

## 2022-04-27 RX ADMIN — Medication 10 ML: at 13:59

## 2022-04-27 RX ADMIN — SODIUM CHLORIDE 100 ML: 9 INJECTION, SOLUTION INTRAVENOUS at 13:58

## 2022-05-03 ENCOUNTER — HOSPITAL ENCOUNTER (OUTPATIENT)
Dept: ULTRASOUND IMAGING | Age: 77
Discharge: HOME | End: 2022-05-03
Attending: INTERNAL MEDICINE
Payer: COMMERCIAL

## 2022-05-03 DIAGNOSIS — R10.13 EPIGASTRIC PAIN: ICD-10-CM

## 2022-05-03 DIAGNOSIS — R79.89 HYPOURICEMIA: ICD-10-CM

## 2022-05-03 PROCEDURE — 76700 US EXAM ABDOM COMPLETE: CPT

## 2022-08-02 ENCOUNTER — OFFICE VISIT (OUTPATIENT)
Dept: INTERNAL MEDICINE CLINIC | Facility: CLINIC | Age: 77
End: 2022-08-02
Payer: MEDICARE

## 2022-08-02 VITALS
DIASTOLIC BLOOD PRESSURE: 80 MMHG | SYSTOLIC BLOOD PRESSURE: 130 MMHG | BODY MASS INDEX: 30.55 KG/M2 | OXYGEN SATURATION: 96 % | WEIGHT: 178 LBS | HEART RATE: 96 BPM

## 2022-08-02 DIAGNOSIS — F51.04 PSYCHOPHYSIOLOGICAL INSOMNIA: ICD-10-CM

## 2022-08-02 DIAGNOSIS — G35 MS (MULTIPLE SCLEROSIS) (HCC): Primary | ICD-10-CM

## 2022-08-02 DIAGNOSIS — Z00.00 MEDICARE ANNUAL WELLNESS VISIT, SUBSEQUENT: ICD-10-CM

## 2022-08-02 PROCEDURE — 1123F ACP DISCUSS/DSCN MKR DOCD: CPT | Performed by: INTERNAL MEDICINE

## 2022-08-02 PROCEDURE — 99213 OFFICE O/P EST LOW 20 MIN: CPT | Performed by: INTERNAL MEDICINE

## 2022-08-02 PROCEDURE — G0439 PPPS, SUBSEQ VISIT: HCPCS | Performed by: INTERNAL MEDICINE

## 2022-08-02 RX ORDER — TRAZODONE HYDROCHLORIDE 50 MG/1
TABLET ORAL
Qty: 90 TABLET | Refills: 1 | Status: SHIPPED | OUTPATIENT
Start: 2022-08-02

## 2022-08-02 RX ORDER — IBUPROFEN 200 MG
200 TABLET ORAL
COMMUNITY

## 2022-08-02 RX ORDER — OMEGA-3 FATTY ACIDS/FISH OIL 300-1000MG
CAPSULE ORAL
COMMUNITY
Start: 2022-03-16

## 2022-08-02 ASSESSMENT — PATIENT HEALTH QUESTIONNAIRE - PHQ9
1. LITTLE INTEREST OR PLEASURE IN DOING THINGS: 0
SUM OF ALL RESPONSES TO PHQ QUESTIONS 1-9: 0
2. FEELING DOWN, DEPRESSED OR HOPELESS: 0
2. FEELING DOWN, DEPRESSED OR HOPELESS: 0
1. LITTLE INTEREST OR PLEASURE IN DOING THINGS: 0
SUM OF ALL RESPONSES TO PHQ9 QUESTIONS 1 & 2: 0
SUM OF ALL RESPONSES TO PHQ QUESTIONS 1-9: 0
SUM OF ALL RESPONSES TO PHQ9 QUESTIONS 1 & 2: 0
SUM OF ALL RESPONSES TO PHQ QUESTIONS 1-9: 0

## 2022-08-02 NOTE — PROGRESS NOTES
Naeem Clarke was seen today for follow-up and medicare aw. Diagnoses and all orders for this visit:    MS (multiple sclerosis) ([facility] [address]  -     CBC with Auto Differential; Future  -     TSH; Future  -     Comprehensive Metabolic Panel; Future  -     Cancel: Sedimentation Rate; Future  -     Comprehensive Metabolic Panel  -     TSH  -     CBC with Auto Differential    Psychophysiological insomnia  Comments:  try trazodone,warned risk  Orders:  -     CBC with Auto Differential; Future  -     TSH; Future  -     Comprehensive Metabolic Panel; Future  -     Cancel: Sedimentation Rate; Future  -     Comprehensive Metabolic Panel  -     TSH  -     CBC with Auto Differential    Medicare annual wellness visit, subsequent    Other orders  -     traZODone (DESYREL) 50 MG tablet; 1/2 to 1 hs sleep        Jair Irby is a 67 y.o. female    Chief Complaint   Patient presents with    Follow-up     Check up and labs at visit    Medicare AWV     Liver -recheck from may. Doing good  MS-doesn't want see anybody,only some fatigue  Results for orders placed or performed in visit on 22   CBC with Auto Differential   Result Value Ref Range    WBC 2.6 (L) 4.3 - 11.1 K/uL    RBC 4.83 4.05 - 5.2 M/uL    Hemoglobin 14.3 11.7 - 15.4 g/dL    Hematocrit 44.0 35.8 - 46.3 %    MCV 91.1 79.6 - 97.8 FL    MCH 29.6 26.1 - 32.9 PG    MCHC 32.5 31.4 - 35.0 g/dL    RDW 13.2 11.9 - 14.6 %    Platelets 899 794 - 404 K/uL    MPV 10.4 9.4 - 12.3 FL    nRBC 0.00 0.0 - 0.2 K/uL       Past Medical History:   Diagnosis Date    Arthritis     left knee    Cancer ([facility] [address]     ovarian    Ear problems     Hearing reduced     Hypercholesterolemia     MS (multiple sclerosis) ([facility] [address]         Rheumatoid arthritis ([facility] [address]        Family History   Problem Relation Age of Onset    Cancer Mother         colon? Heart Attack Mother 80    Other Father          in  [facility]        Social History     Socioeconomic History    Marital status:       Spouse name: Not on file Number of children: Not on file    Years of education: Not on file    Highest education level: Not on file   Occupational History    Not on file   Tobacco Use    Smoking status: Former     Packs/day: 1.00     Years: 40.00     Pack years: 40.00     Types: Cigarettes     Quit date: 10/31/2000     Years since quittin.7    Smokeless tobacco: Never   Vaping Use    Vaping Use: Never used   Substance and Sexual Activity    Alcohol use: No    Drug use: No    Sexual activity: Not on file   Other Topics Concern    Not on file   Social History Narrative    Not on file     Social Determinants of Health     Financial Resource Strain: Not on file   Food Insecurity: Not on file   Transportation Needs: Not on file   Physical Activity: Inactive    Days of Exercise per Week: 0 days    Minutes of Exercise per Session: 0 min   Stress: Not on file   Social Connections: Not on file   Intimate Partner Violence: Not on file   Housing Stability: Not on file         Current Outpatient Medications:     Multiple Vitamin (MULTIVITAMIN ADULT PO), Take 1 tablet by mouth daily, Disp: , Rfl:     Omega 3 1000 MG CAPS, Take by mouth, Disp: , Rfl:     NONFORMULARY, Calcium  ? mg, Disp: , Rfl:     ibuprofen (ADVIL;MOTRIN) 200 MG tablet, Take 200 mg by mouth 2 tab q am, Disp: , Rfl:     traZODone (DESYREL) 50 MG tablet, 1/2 to 1 hs sleep, Disp: 90 tablet, Rfl: 1    aspirin 81 MG EC tablet, Take by mouth daily, Disp: , Rfl:     Cholecalciferol 50 MCG (2000) TABS, Take by mouth, Disp: , Rfl:     denosumab (PROLIA) 60 MG/ML SOSY SC injection, Inject 60 mg into the skin, Disp: , Rfl:     Allergies   Allergen Reactions    Morphine Other (See Comments)     hallucinations    Statins Myalgia     Other reaction(s): Myalgia           Review of Systems      Vitals:    22 1415   BP: 130/80   Pulse: 96   SpO2: 96%   Weight: 178 lb (80.7 kg)           Physical Exam  Constitutional:       Appearance: She is obese. She is not ill-appearing.    Eyes: Extraocular Movements: Extraocular movements intact. Conjunctiva/sclera: Conjunctivae normal.   Cardiovascular:      Rate and Rhythm: Normal rate and regular rhythm. Heart sounds: Normal heart sounds. Pulmonary:      Effort: Pulmonary effort is normal.      Breath sounds: Normal breath sounds. Abdominal:      General: Bowel sounds are normal.      Palpations: Abdomen is soft. Skin:     General: Skin is warm. Coloration: Skin is not jaundiced. Neurological:      General: No focal deficit present. Mental Status: She is alert. Mental status is at baseline. Psychiatric:         Mood and Affect: Mood normal.         Thought Content: Thought content normal.          Jerrod Duong was seen today for follow-up and medicare awv. Diagnoses and all orders for this visit:    [facility]  -     CBC with Auto Differential; Future  -     TSH; Future  -     Comprehensive Metabolic Panel; Future  -     Cancel: Sedimentation Rate; Future  -     Comprehensive Metabolic Panel  -     TSH  -     CBC with Auto Differential    Psychophysiological insomnia  Comments:  try trazodone,warned risk  Orders:  -     CBC with Auto Differential; Future  -     TSH; Future  -     Comprehensive Metabolic Panel; Future  -     Cancel: Sedimentation Rate; Future  -     Comprehensive Metabolic Panel  -     TSH  -     CBC with Auto Differential    Medicare annual wellness visit, subsequent    Other orders  -     traZODone (DESYREL) 50 MG tablet; 1/2 to 1 hs sleep               Carroll Brantley, DO    Medicare Annual Wellness Visit    Wesley Navarrete is here for Follow-up (Check up and labs at visit) and Medicare AWV    Assessment & Plan   MS (multiple sclerosis) ([facility]  -     CBC with Auto Differential; Future  -     TSH; Future  -     Comprehensive Metabolic Panel; Future  Psychophysiological insomnia  Comments:  try trazodone,warned risk  Orders:  -     CBC with Auto Differential; Future  -     TSH;  Future  - Comprehensive Metabolic Panel; Future  Medicare annual wellness visit, subsequent    Recommendations for Preventive Services Due: see orders and patient instructions/AVS.  Recommended screening schedule for the next 5-10 years is provided to the patient in written form: see Patient Instructions/AVS.     Return for Medicare Annual Wellness Visit in 1 year. Subjective   The following acute and/or chronic problems were also addressed today:  Insomnia worsening  1  Patient's complete Health Risk Assessment and screening values have been reviewed and are found in Flowsheets. The following problems were reviewed today and where indicated follow up appointments were made and/or referrals ordered.     Positive Risk Factor Screenings with Interventions:             General Health and ACP:  General  In general, how would you say your health is?: Very Good  In the past 7 days, have you experienced any of the following: New or Increased Pain, New or Increased Fatigue, Loneliness, Social Isolation, Stress or Anger?: (!) Yes  Select all that apply: (!) Stress  Do you get the social and emotional support that you need?: Yes  Do you have a Living Will?: Yes    Advance Directives       Power of  Living Will ACP-Advance Directive ACP-Power of     Not on File Not on File Not on File Not on File          General Health Risk Interventions:  Poor self-assessment of health status: patient advised to follow-up in this office for further evaluation and treatment of 6 within 6 month(s)    Health Habits/Nutrition:  Physical Activity: Inactive    Days of Exercise per Week: 0 days    Minutes of Exercise per Session: 0 min     Have you lost any weight without trying in the past 3 months?: No     Have you seen the dentist within the past year?: Appointment is scheduled  Health Habits/Nutrition Interventions:  Inadequate physical activity:  patient agrees to exercise for at least 150 minutes/week    Hearing/Vision:  Do you or your family notice any trouble with your hearing that hasn't been managed with hearing aids?: (!) Yes  Do you have difficulty driving, watching TV, or doing any of your daily activities because of your eyesight?: No  Have you had an eye exam within the past year?: Yes  No results found. Hearing/Vision Interventions:  Hearing concerns:  patient declines any further evaluation/treatment for hearing issues            Objective   Vitals:    08/02/22 1415   BP: 130/80   Pulse: 96   SpO2: 96%   Weight: 178 lb (80.7 kg)      Body mass index is 30.55 kg/m². General Appearance: alert and oriented to person, place and time, well-developed and well-nourished, in no acute distress and alert and oriented to person, place and time       Allergies   Allergen Reactions    Morphine Other (See Comments)     hallucinations    Statins Myalgia     Other reaction(s): Myalgia       Prior to Visit Medications    Medication Sig Taking?  Authorizing Provider   Multiple Vitamin (MULTIVITAMIN ADULT PO) Take 1 tablet by mouth daily Yes Historical Provider, MD   Omega 3 1000 MG CAPS Take by mouth Yes Historical Provider, MD   NONFORMULARY Calcium  ? mg Yes Historical Provider, MD   ibuprofen (ADVIL;MOTRIN) 200 MG tablet Take 200 mg by mouth 2 tab q am Yes Historical Provider, MD   traZODone (DESYREL) 50 MG tablet 1/2 to 1 hs sleep Yes Kapil Kirk DO   aspirin 81 MG EC tablet Take by mouth daily Yes Ar Automatic Reconciliation   Cholecalciferol 50 MCG (2000 UT) TABS Take by mouth Yes Ar Automatic Reconciliation   denosumab (PROLIA) 60 MG/ML SOSY SC injection Inject 60 mg into the skin Yes Ar Automatic Reconciliation       CareTeam (Including outside providers/suppliers regularly involved in providing care):   Patient Care Team:  Kapil Kirk DO as PCP - [address] [place], [place] [address]DO as PCP - [facility] Empaneled Provider     Reviewed and updated this visit:  Tobacco  Allergies  Meds  Problems  Med Hx  Surg Hx  Soc Hx  Fam Hx

## 2022-08-02 NOTE — PATIENT INSTRUCTIONS
Personalized Preventive Plan for Real Adams - 8/2/2022  Medicare offers a range of preventive health benefits. Some of the tests and screenings are paid in full while other may be subject to a deductible, co-insurance, and/or copay. Some of these benefits include a comprehensive review of your medical history including lifestyle, illnesses that may run in your family, and various assessments and screenings as appropriate. After reviewing your medical record and screening and assessments performed today your provider may have ordered immunizations, labs, imaging, and/or referrals for you. A list of these orders (if applicable) as well as your Preventive Care list are included within your After Visit Summary for your review. Other Preventive Recommendations:    A preventive eye exam performed by an eye specialist is recommended every 1-2 years to screen for glaucoma; cataracts, macular degeneration, and other eye disorders. A preventive dental visit is recommended every 6 months. Try to get at least 150 minutes of exercise per week or 10,000 steps per day on a pedometer . Order or download the FREE \"Exercise & Physical Activity: Your Everyday Guide\" from [employer]. Call 9-998.650.5800 or search [employer] online. You need 3925-8182 mg of calcium and 2583-5589 IU of vitamin D per day. It is possible to meet your calcium requirement with diet alone, but a vitamin D supplement is usually necessary to meet this goal.  When exposed to the sun, use a sunscreen that protects against both UVA and UVB radiation with an SPF of 30 or greater. Reapply every 2 to 3 hours or after sweating, drying off with a towel, or swimming. Always wear a seat belt when traveling in a car. Always wear a helmet when riding a bicycle or motorcycle.

## 2022-08-03 LAB
ALBUMIN SERPL-MCNC: 3.5 G/DL (ref 3.2–4.6)
ALBUMIN/GLOB SERPL: 0.8 {RATIO} (ref 1.2–3.5)
ALP SERPL-CCNC: 110 U/L (ref 50–136)
ALT SERPL-CCNC: 32 U/L (ref 12–65)
ANION GAP SERPL CALC-SCNC: 10 MMOL/L (ref 7–16)
AST SERPL-CCNC: 25 U/L (ref 15–37)
BASOPHILS # BLD: 0.1 K/UL (ref 0–0.2)
BASOPHILS NFR BLD: 2 % (ref 0–2)
BILIRUB SERPL-MCNC: 0.4 MG/DL (ref 0.2–1.1)
BUN SERPL-MCNC: 14 MG/DL (ref 8–23)
CALCIUM SERPL-MCNC: 9.2 MG/DL (ref 8.3–10.4)
CHLORIDE SERPL-SCNC: 104 MMOL/L (ref 98–107)
CO2 SERPL-SCNC: 26 MMOL/L (ref 21–32)
CREAT SERPL-MCNC: 0.8 MG/DL (ref 0.6–1)
DIFFERENTIAL METHOD BLD: ABNORMAL
EOSINOPHIL # BLD: 0.1 K/UL (ref 0–0.8)
EOSINOPHIL NFR BLD: 3 % (ref 0.5–7.8)
ERYTHROCYTE [DISTWIDTH] IN BLOOD BY AUTOMATED COUNT: 13.2 % (ref 11.9–14.6)
GLOBULIN SER CALC-MCNC: 4.6 G/DL (ref 2.3–3.5)
GLUCOSE SERPL-MCNC: 101 MG/DL (ref 65–100)
HCT VFR BLD AUTO: 44 % (ref 35.8–46.3)
HGB BLD-MCNC: 14.3 G/DL (ref 11.7–15.4)
IMM GRANULOCYTES # BLD AUTO: 0 K/UL (ref 0–0.5)
IMM GRANULOCYTES NFR BLD AUTO: 0 % (ref 0–5)
LYMPHOCYTES # BLD: 1.6 K/UL (ref 0.5–4.6)
LYMPHOCYTES NFR BLD: 63 % (ref 13–44)
MCH RBC QN AUTO: 29.6 PG (ref 26.1–32.9)
MCHC RBC AUTO-ENTMCNC: 32.5 G/DL (ref 31.4–35)
MCV RBC AUTO: 91.1 FL (ref 79.6–97.8)
MONOCYTES # BLD: 0.6 K/UL (ref 0.1–1.3)
MONOCYTES NFR BLD: 23 % (ref 4–12)
NEUTS SEG # BLD: 0.2 K/UL (ref 1.7–8.2)
NEUTS SEG NFR BLD: 9 % (ref 43–78)
NRBC # BLD: 0 K/UL (ref 0–0.2)
PLATELET # BLD AUTO: 201 K/UL (ref 150–450)
PLATELET COMMENT: ADEQUATE
PMV BLD AUTO: 10.4 FL (ref 9.4–12.3)
POTASSIUM SERPL-SCNC: 3.9 MMOL/L (ref 3.5–5.1)
PROT SERPL-MCNC: 8.1 G/DL (ref 6.3–8.2)
RBC # BLD AUTO: 4.83 M/UL (ref 4.05–5.2)
RBC MORPH BLD: ABNORMAL
SODIUM SERPL-SCNC: 140 MMOL/L (ref 136–145)
TSH, 3RD GENERATION: 1.06 UIU/ML (ref 0.36–3.74)
WBC # BLD AUTO: 2.6 K/UL (ref 4.3–11.1)
WBC MORPH BLD: ABNORMAL

## 2022-11-15 ENCOUNTER — TELEPHONE (OUTPATIENT)
Dept: RHEUMATOLOGY | Age: 77
End: 2022-11-15

## 2022-11-15 NOTE — TELEPHONE ENCOUNTER
Patient due for Prolia 10/14/22. Bif received- [facility]- will cover 100%. States PA needed. Will call to verify as not needed in April.

## 2022-11-22 NOTE — TELEPHONE ENCOUNTER
PA from faxed with clinicals to THE [facility]. THE [facility] site states PA is needed.

## 2022-12-01 NOTE — TELEPHONE ENCOUNTER
PA has been approved by THE [facility] for [employer]. 11/22/22- 11/21/23. 60 units x 2 visits for a total of 120 units. PHONE CALL to patient to schedule. LEFT MESSAGE on cell # to call me back .  Also sent [employer] message    Lab Results   Component Value Date/Time    VITD25 49.9 04/14/2022 02:53 PM     Lab Results   Component Value Date     08/02/2022    K 3.9 08/02/2022     08/02/2022    CO2 26 08/02/2022    BUN 14 08/02/2022    CREATININE 0.80 08/02/2022    GLUCOSE 101 (H) 08/02/2022    CALCIUM 9.2 08/02/2022    PROT 8.1 08/02/2022    LABALBU 3.5 08/02/2022    BILITOT 0.4 08/02/2022    ALKPHOS 110 08/02/2022    AST 25 08/02/2022    ALT 32 08/02/2022    LABGLOM >60 08/02/2022    GFRAA >60 08/02/2022    AGRATIO 1.2 05/05/2022    GLOB 4.6 (H) 08/02/2022

## 2023-03-09 ENCOUNTER — TELEPHONE (OUTPATIENT)
Dept: RHEUMATOLOGY | Age: 78
End: 2023-03-09

## 2023-09-19 ENCOUNTER — OFFICE VISIT (OUTPATIENT)
Dept: INTERNAL MEDICINE CLINIC | Facility: CLINIC | Age: 78
End: 2023-09-19
Payer: MEDICARE

## 2023-09-19 ENCOUNTER — NURSE TRIAGE (OUTPATIENT)
Dept: OTHER | Facility: CLINIC | Age: 78
End: 2023-09-19

## 2023-09-19 VITALS
TEMPERATURE: 98.5 F | WEIGHT: 176 LBS | HEART RATE: 75 BPM | HEIGHT: 64 IN | DIASTOLIC BLOOD PRESSURE: 60 MMHG | OXYGEN SATURATION: 98 % | SYSTOLIC BLOOD PRESSURE: 118 MMHG | BODY MASS INDEX: 30.05 KG/M2

## 2023-09-19 DIAGNOSIS — M79.671 RIGHT FOOT PAIN: ICD-10-CM

## 2023-09-19 DIAGNOSIS — Z91.81 AT HIGH RISK FOR FALLS: ICD-10-CM

## 2023-09-19 DIAGNOSIS — M25.571 ACUTE RIGHT ANKLE PAIN: Primary | ICD-10-CM

## 2023-09-19 DIAGNOSIS — M25.571 ACUTE RIGHT ANKLE PAIN: ICD-10-CM

## 2023-09-19 PROCEDURE — 99213 OFFICE O/P EST LOW 20 MIN: CPT | Performed by: NURSE PRACTITIONER

## 2023-09-19 PROCEDURE — 1123F ACP DISCUSS/DSCN MKR DOCD: CPT | Performed by: NURSE PRACTITIONER

## 2023-09-19 SDOH — ECONOMIC STABILITY: FOOD INSECURITY: WITHIN THE PAST 12 MONTHS, YOU WORRIED THAT YOUR FOOD WOULD RUN OUT BEFORE YOU GOT MONEY TO BUY MORE.: NEVER TRUE

## 2023-09-19 SDOH — ECONOMIC STABILITY: HOUSING INSECURITY
IN THE LAST 12 MONTHS, WAS THERE A TIME WHEN YOU DID NOT HAVE A STEADY PLACE TO SLEEP OR SLEPT IN A SHELTER (INCLUDING NOW)?: NO

## 2023-09-19 SDOH — ECONOMIC STABILITY: INCOME INSECURITY: HOW HARD IS IT FOR YOU TO PAY FOR THE VERY BASICS LIKE FOOD, HOUSING, MEDICAL CARE, AND HEATING?: NOT HARD AT ALL

## 2023-09-19 SDOH — ECONOMIC STABILITY: FOOD INSECURITY: WITHIN THE PAST 12 MONTHS, THE FOOD YOU BOUGHT JUST DIDN'T LAST AND YOU DIDN'T HAVE MONEY TO GET MORE.: NEVER TRUE

## 2023-09-19 ASSESSMENT — PATIENT HEALTH QUESTIONNAIRE - PHQ9
SUM OF ALL RESPONSES TO PHQ QUESTIONS 1-9: 0
1. LITTLE INTEREST OR PLEASURE IN DOING THINGS: 0
2. FEELING DOWN, DEPRESSED OR HOPELESS: 0
SUM OF ALL RESPONSES TO PHQ9 QUESTIONS 1 & 2: 0
SUM OF ALL RESPONSES TO PHQ QUESTIONS 1-9: 0

## 2023-09-19 NOTE — TELEPHONE ENCOUNTER
Location of [place]: [place]    Received call from [facility] with [employer]. Subjective: Caller states fell on Saturday while out shopping. Hit head and elbow and now right ankle is swollen. No LOC  Son states she slipped and fell and refused EMS to be dispatched. Son was unable to provide further information so I called daughter, Christiano Gonzalez, who has seen the patient today. Christiano Gonzalez states that today swelling is worse and pain increases with weight bearing. Has been wearing a boot and it was getting better but now states it's getting worse. Current Symptoms: Swelling and bruising to right ankle    Onset: 4 days ago; sudden    Associated Symptoms: NA    Pain Severity: Pt not with caller    Temperature: denies     What has been tried: Elevation and wearing a boot. Ibuprofen and ice    LMP: NA Pregnant: NA    Recommended disposition: See in Office Today    Care advice provided, patient verbalizes understanding; denies any other questions or concerns; instructed to call back for any new or worsening symptoms. Patient/Caller agrees with recommended disposition; writer provided warm transfer to [facility] for appointment scheduling    Attention Provider: Thank you for allowing me to participate in the care of your patient. The patient was connected to triage in response to information provided to the ECC/PSC. Please do not respond through this encounter as the response is not directed to a shared pool. Reason for Disposition   Large swelling or bruise and size > palm of person's hand    Protocols used:  Ankle and Foot Injury-ADULT-[place]

## 2023-09-19 NOTE — PROGRESS NOTES
Camille Flores (:  1945) is a 65 y.o. female,Established patient, here for evaluation of the following chief complaint(s): Ankle Injury (Right/)         ASSESSMENT/PLAN:  1. Acute right ankle pain  -     XR ANKLE RIGHT (MIN 3 VIEWS); Future  -     XR FOOT RIGHT (MIN 3 VIEWS); Future  2. Right foot pain  -     XR ANKLE RIGHT (MIN 3 VIEWS); Future  -     XR FOOT RIGHT (MIN 3 VIEWS); Future  3. At high risk for falls      Return in about 2 weeks (around 10/3/2023), or if symptoms worsen or fail to improve, for Dr Janeen Mak follow up. Fall on Saturday   She did hit her head, no LOC, no headache, no neurological deficits, discussed CT, declines  Foot with bruising and tenderness, get xray foot and ankle  Elbow with good ROM, w.o bruising or swelling  High risk falls, may benefit from PT In future        Subjective   SUBJECTIVE/OBJECTIVE:  Patient is here for a fall. She slipped and fell and twisted her ankle. She fell Saturday. The ankle is hurting now. She did hit elbow and head but no loss of consciousness, she got up immediately after hte fall. She slept well last night but the ankle swelled up in the night again. She took 2 ibuprofen this AM    Fall  The accident occurred 3 to 5 days ago. Fall occurred: while walking on concrte. She landed on [place]. Point of impact: elbow, head, right ankle. The symptoms are aggravated by ambulation. Review of Systems       Objective   Physical Exam  Vitals reviewed. Constitutional:       Appearance: Normal appearance. She is not ill-appearing. Eyes:      Extraocular Movements: Extraocular movements intact. Pupils: Pupils are equal, round, and reactive to light. Cardiovascular:      Rate and Rhythm: Normal rate and regular rhythm. Pulmonary:      Effort: Pulmonary effort is normal.      Breath sounds: No wheezing. Musculoskeletal:         General: Swelling and tenderness present. Cervical back: Neck supple.       Right ankle: Tenderness

## 2023-09-19 NOTE — PATIENT INSTRUCTIONS
TRANSPORTATION RESOURCES    Medicaid Mesha Padgett: Girma Garrett. NEW NAME - [employer]   Phone:  [phone]  Must call for a ride at least 3 days before your appointment. Call Monday- Friday 8:00am to 5:00pm.    Transportation is available for MD appts, dialysis, x-rays, lab work, drug store, or other medical appointments. [address] on Aging  What they offer: Provides assistance and medical transport to adults 60 years and older. Phone: [phone]    [employer]   What they offer: Charge a fee for transport (not free)  Phone: [phone]    [employer] on Demand   What they offer: Charge a fee for transport (not free)  Phone: 21 [phone] they offer: Kaitlynn Love is accessible via an online platform that connects patients with non-emergency medical transportation (NEMT.) [employer] is a comprehensive solution which supports all levels of transport: rideshare (Lyft/Uber), Taxis, wheelchair vans, stretcher vehicles, ALS/BLS, and all payors when and where they are needed. https://Paymetric/      Need additional resources? Call 211 or visit Find Help:  https://www. findhelp.org/

## 2023-09-20 ENCOUNTER — OFFICE VISIT (OUTPATIENT)
Dept: ORTHOPEDIC SURGERY | Age: 78
End: 2023-09-20
Payer: MEDICARE

## 2023-09-20 DIAGNOSIS — S82.64XA CLOSED NONDISPLACED FRACTURE OF LATERAL MALLEOLUS OF RIGHT FIBULA, INITIAL ENCOUNTER: Primary | ICD-10-CM

## 2023-09-20 DIAGNOSIS — S82.61XA CLOSED FRACTURE OF PROXIMAL LATERAL MALLEOLUS OF RIGHT FIBULA, INITIAL ENCOUNTER: Primary | ICD-10-CM

## 2023-09-20 PROCEDURE — 1123F ACP DISCUSS/DSCN MKR DOCD: CPT | Performed by: ORTHOPAEDIC SURGERY

## 2023-09-20 PROCEDURE — 99204 OFFICE O/P NEW MOD 45 MIN: CPT | Performed by: ORTHOPAEDIC SURGERY

## 2023-09-20 NOTE — PROGRESS NOTES
Name: Susan Moore  YOB: 1945  Gender: female  MRN: [id number]    Summary:     Right Yang B stable distal fibula fracture     CC: New Patient (Right ankle xrays in system. No new xrays today in office. )       HPI: Susan Moore is a 65 y.o. female who presents with New Patient (Right ankle xrays in system. No new xrays today in office. )  . This patient presents to the office today several days out from an injury she sustained to her right ankle. She was had some x-rays performed emergency room and presents today for orthopedic follow-up. SHe is able to fully weight-bear into the office by wearing a small soft brace on the ankle. History was obtained by Patient and her son    ROS/Meds/PSH/PMH/FH/SH: I personally reviewed the patients standard intake form. Below are the pertinents    Tobacco:  reports that she quit smoking about 22 years ago. Her smoking use included cigarettes. She has a 40.00 pack-year smoking history. She has never used smokeless tobacco.  Diabetes: None      Physical Examination:    Ramakrishna of the right foot and ankle shows expected bruising and swelling. There are no fracture blisters noted. There is no significant medial ankle tenderness. Her peroneal tendons are intact. Imaging:   I independently interpreted XR ordered by a different physician, taken from an outside facility right foot and right ankle which shows a nondisplaced Yang B distal fibula fracture with intact ankle mortise           Symone Singleton III, MD           Assessment:   Stable lateral malleolus fracture    Treatment Plan:   4 This is a chronic illness/condition with exacerbation and progression  Treatment at this time: Closed treatment of fracture without manipulation which is an elective major procedure without identified risk factors  Studies ordered:  Ankle XR needed @ Next Visit    Weight-bearing status: WBAT        Return to work/work restrictions: none  No medications given    She will

## 2023-10-10 ENCOUNTER — OFFICE VISIT (OUTPATIENT)
Dept: INTERNAL MEDICINE CLINIC | Facility: CLINIC | Age: 78
End: 2023-10-10
Payer: MEDICARE

## 2023-10-10 VITALS
WEIGHT: 172 LBS | SYSTOLIC BLOOD PRESSURE: 120 MMHG | TEMPERATURE: 97.4 F | BODY MASS INDEX: 29.37 KG/M2 | DIASTOLIC BLOOD PRESSURE: 70 MMHG | HEIGHT: 64 IN | HEART RATE: 90 BPM | OXYGEN SATURATION: 96 %

## 2023-10-10 DIAGNOSIS — G35 MS (MULTIPLE SCLEROSIS) (HCC): ICD-10-CM

## 2023-10-10 DIAGNOSIS — M05.79 RHEUMATOID ARTHRITIS WITH RHEUMATOID FACTOR OF MULTIPLE SITES WITHOUT ORGAN OR SYSTEMS INVOLVEMENT (HCC): ICD-10-CM

## 2023-10-10 DIAGNOSIS — Z00.00 MEDICARE ANNUAL WELLNESS VISIT, SUBSEQUENT: Primary | ICD-10-CM

## 2023-10-10 DIAGNOSIS — R73.01 IMPAIRED FASTING GLUCOSE: ICD-10-CM

## 2023-10-10 LAB
BASOPHILS # BLD: 0 K/UL (ref 0–0.2)
BASOPHILS NFR BLD: 1 % (ref 0–2)
DIFFERENTIAL METHOD BLD: ABNORMAL
EOSINOPHIL # BLD: 0.1 K/UL (ref 0–0.8)
EOSINOPHIL NFR BLD: 2 % (ref 0.5–7.8)
ERYTHROCYTE [DISTWIDTH] IN BLOOD BY AUTOMATED COUNT: 13.3 % (ref 11.9–14.6)
HCT VFR BLD AUTO: 43.3 % (ref 35.8–46.3)
HGB BLD-MCNC: 13.7 G/DL (ref 11.7–15.4)
IMM GRANULOCYTES # BLD AUTO: 0 K/UL (ref 0–0.5)
IMM GRANULOCYTES NFR BLD AUTO: 0 % (ref 0–5)
LYMPHOCYTES # BLD: 2 K/UL (ref 0.5–4.6)
LYMPHOCYTES NFR BLD: 62 % (ref 13–44)
MCH RBC QN AUTO: 28.8 PG (ref 26.1–32.9)
MCHC RBC AUTO-ENTMCNC: 31.6 G/DL (ref 31.4–35)
MCV RBC AUTO: 91.2 FL (ref 82–102)
MONOCYTES # BLD: 0.5 K/UL (ref 0.1–1.3)
MONOCYTES NFR BLD: 14 % (ref 4–12)
NEUTS SEG # BLD: 0.7 K/UL (ref 1.7–8.2)
NEUTS SEG NFR BLD: 21 % (ref 43–78)
NRBC # BLD: 0 K/UL (ref 0–0.2)
PLATELET # BLD AUTO: 189 K/UL (ref 150–450)
PMV BLD AUTO: 10.5 FL (ref 9.4–12.3)
RBC # BLD AUTO: 4.75 M/UL (ref 4.05–5.2)
WBC # BLD AUTO: 3.3 K/UL (ref 4.3–11.1)

## 2023-10-10 PROCEDURE — 1123F ACP DISCUSS/DSCN MKR DOCD: CPT | Performed by: INTERNAL MEDICINE

## 2023-10-10 PROCEDURE — G0439 PPPS, SUBSEQ VISIT: HCPCS | Performed by: INTERNAL MEDICINE

## 2023-10-10 PROCEDURE — 99214 OFFICE O/P EST MOD 30 MIN: CPT | Performed by: INTERNAL MEDICINE

## 2023-10-10 ASSESSMENT — PATIENT HEALTH QUESTIONNAIRE - PHQ9
SUM OF ALL RESPONSES TO PHQ QUESTIONS 1-9: 0
1. LITTLE INTEREST OR PLEASURE IN DOING THINGS: 0
SUM OF ALL RESPONSES TO PHQ QUESTIONS 1-9: 0
2. FEELING DOWN, DEPRESSED OR HOPELESS: 0
SUM OF ALL RESPONSES TO PHQ QUESTIONS 1-9: 0
SUM OF ALL RESPONSES TO PHQ QUESTIONS 1-9: 0
SUM OF ALL RESPONSES TO PHQ9 QUESTIONS 1 & 2: 0

## 2023-10-10 ASSESSMENT — LIFESTYLE VARIABLES
HOW OFTEN DO YOU HAVE A DRINK CONTAINING ALCOHOL: NEVER
HOW MANY STANDARD DRINKS CONTAINING ALCOHOL DO YOU HAVE ON A TYPICAL DAY: PATIENT DOES NOT DRINK

## 2023-10-11 LAB
ALBUMIN SERPL-MCNC: 3.6 G/DL (ref 3.2–4.6)
ALBUMIN/GLOB SERPL: 0.8 (ref 0.4–1.6)
ALP SERPL-CCNC: 112 U/L (ref 50–136)
ALT SERPL-CCNC: 28 U/L (ref 12–65)
ANION GAP SERPL CALC-SCNC: 6 MMOL/L (ref 2–11)
AST SERPL-CCNC: 19 U/L (ref 15–37)
BILIRUB SERPL-MCNC: 0.4 MG/DL (ref 0.2–1.1)
BUN SERPL-MCNC: 15 MG/DL (ref 8–23)
CALCIUM SERPL-MCNC: 9.4 MG/DL (ref 8.3–10.4)
CHLORIDE SERPL-SCNC: 105 MMOL/L (ref 101–110)
CHOLEST SERPL-MCNC: 248 MG/DL
CO2 SERPL-SCNC: 27 MMOL/L (ref 21–32)
CREAT SERPL-MCNC: 0.8 MG/DL (ref 0.6–1)
EST. AVERAGE GLUCOSE BLD GHB EST-MCNC: 117 MG/DL
GLOBULIN SER CALC-MCNC: 4.4 G/DL (ref 2.8–4.5)
GLUCOSE SERPL-MCNC: 96 MG/DL (ref 65–100)
HBA1C MFR BLD: 5.7 % (ref 4.8–5.6)
HDLC SERPL-MCNC: 59 MG/DL (ref 40–60)
HDLC SERPL: 4.2
LDLC SERPL CALC-MCNC: 161.2 MG/DL
POTASSIUM SERPL-SCNC: 4.1 MMOL/L (ref 3.5–5.1)
PROT SERPL-MCNC: 8 G/DL (ref 6.3–8.2)
SODIUM SERPL-SCNC: 138 MMOL/L (ref 133–143)
TRIGL SERPL-MCNC: 139 MG/DL (ref 35–150)
TSH, 3RD GENERATION: 0.86 UIU/ML (ref 0.36–3.74)
VLDLC SERPL CALC-MCNC: 27.8 MG/DL (ref 6–23)

## 2023-10-12 ASSESSMENT — ENCOUNTER SYMPTOMS
SINUS PAIN: 0
NAUSEA: 0
WHEEZING: 0
VOMITING: 0
CONSTIPATION: 0
ABDOMINAL PAIN: 0
SHORTNESS OF BREATH: 0
DIARRHEA: 0

## 2024-05-30 ENCOUNTER — OFFICE VISIT (OUTPATIENT)
Dept: RHEUMATOLOGY | Age: 79
End: 2024-05-30
Payer: MEDICARE

## 2024-05-30 VITALS
DIASTOLIC BLOOD PRESSURE: 85 MMHG | HEIGHT: 63 IN | BODY MASS INDEX: 30.9 KG/M2 | HEART RATE: 73 BPM | RESPIRATION RATE: 16 BRPM | SYSTOLIC BLOOD PRESSURE: 170 MMHG | OXYGEN SATURATION: 96 % | WEIGHT: 174.4 LBS

## 2024-05-30 DIAGNOSIS — M81.0 POSTMENOPAUSAL OSTEOPOROSIS: Primary | ICD-10-CM

## 2024-05-30 PROCEDURE — 1123F ACP DISCUSS/DSCN MKR DOCD: CPT | Performed by: INTERNAL MEDICINE

## 2024-05-30 PROCEDURE — 99214 OFFICE O/P EST MOD 30 MIN: CPT | Performed by: INTERNAL MEDICINE

## 2024-05-30 NOTE — PROGRESS NOTES
[facility] RHEUMATOLOGY  GLORIA Mai M.D.  [address] [facility], [address]   Phone: [phone]   Fax: [phone]    4/14/2022 12:49 PM      SUBJECTIVE: Edelmira Jaquez is a 76 y.o. female referred for possible rheumatoid arthritis.  She has had trouble apparently dating back to May or June 2020.  She has had apparent swelling and inflammation in small joints of the hands and feet in particular with some pain in the knees and ankles and cervical spine she has received steroids on several occasions with marked improvement very quickly and currently is on 10 mg of prednisone a day.  .  She was having significant morning stiffness and difficulty doing self-care activities until the steroids were started.  She had a positive rheumatoid factor assay and an elevated acute phase reactant.    She had a bone density done back in 2016 that showed osteopenia but that she was at high risk with her 10-year probability of hip fracture being 3.5%.  According to the patient and her son no specific action was taken and she is never been on specific therapy for her abnormal bone density.  No history of fracture.  She has been on steroids chronically in the past for multiple sclerosis.    Since last visit 4/14/2022    RA (.8, CCP>250): Patient has not been seen for about 2 years.  Does complain of some hand pain may have some osteoarthritis today but I did tell her that did not do General rheumatology all I do is osteoporosis    Osteopenia with high fracture risk: Denosumab started on 9/2/2021: She did get a second injection in 2022 but has not had any treatment in several years.  She had a low trauma ankle fracture recently apparently when she was walking and twisted her ankle.  She is on an unknown calcium and unknown vitamin D supplement    Abnormal LFTs: Noted on labs done in March of this year she had an elevated alkaline phos of 195 with an elevated SGPT of 60.  Dr. Carney ordered these

## 2024-05-31 ENCOUNTER — TELEPHONE (OUTPATIENT)
Dept: RHEUMATOLOGY | Age: 79
End: 2024-05-31

## 2024-05-31 NOTE — TELEPHONE ENCOUNTER
----- Message from Annalise Maciel sent at 5/31/2024  8:06 AM EDT -----  Regarding: RE: prolia  Bif sent 5/31/2024 eh  ----- Message -----  From: Emily Caldwell MA  Sent: 5/30/2024   4:45 PM EDT  To: Annalise Maciel; Emily Caldwell MA  Subject: FW: prolia                                       Ev, please check coverage for Prolia. thanks  ----- Message -----  From: Emily Caldwell MA  Sent: 5/30/2024   3:37 PM EDT  To: Emily Caldwell MA  Subject: prolia                                           Stephon Mai MD Lipscomb, Shara Y, MA  Has been on denosumab in the past but not been seen or had any treatment for 2 years and need to get back on denosumab

## 2024-06-26 ENCOUNTER — TELEMEDICINE (OUTPATIENT)
Dept: INTERNAL MEDICINE CLINIC | Facility: CLINIC | Age: 79
End: 2024-06-26
Payer: MEDICARE

## 2024-06-26 DIAGNOSIS — M05.79 RHEUMATOID ARTHRITIS WITH RHEUMATOID FACTOR OF MULTIPLE SITES WITHOUT ORGAN OR SYSTEMS INVOLVEMENT (HCC): Primary | ICD-10-CM

## 2024-06-26 DIAGNOSIS — M81.0 AGE-RELATED OSTEOPOROSIS WITHOUT CURRENT PATHOLOGICAL FRACTURE: ICD-10-CM

## 2024-06-26 PROCEDURE — 1123F ACP DISCUSS/DSCN MKR DOCD: CPT | Performed by: INTERNAL MEDICINE

## 2024-06-26 PROCEDURE — 99213 OFFICE O/P EST LOW 20 MIN: CPT | Performed by: INTERNAL MEDICINE

## 2024-06-26 RX ORDER — PREDNISONE 5 MG/1
TABLET ORAL
Qty: 55 TABLET | Refills: 0 | Status: SHIPPED | OUTPATIENT
Start: 2024-06-26

## 2024-06-26 ASSESSMENT — PATIENT HEALTH QUESTIONNAIRE - PHQ9
2. FEELING DOWN, DEPRESSED OR HOPELESS: NOT AT ALL
SUM OF ALL RESPONSES TO PHQ QUESTIONS 1-9: 0
SUM OF ALL RESPONSES TO PHQ9 QUESTIONS 1 & 2: 0
1. LITTLE INTEREST OR PLEASURE IN DOING THINGS: NOT AT ALL

## 2024-06-26 ASSESSMENT — LIFESTYLE VARIABLES: HOW MANY STANDARD DRINKS CONTAINING ALCOHOL DO YOU HAVE ON A TYPICAL DAY: PATIENT DOES NOT DRINK

## 2024-06-26 NOTE — PROGRESS NOTES
2024    TELEHEALTH EVALUATION -- Audio/Visual    HPI:    Edelmira Jaquez (:  1945) has requested an audio/video evaluation for the following concern(s):  Rheum--stopped and sending her new rheumatology      Ra worsening-hurting neck  Worsening am    Review of Systems   Constitutional:  Positive for fatigue and fever.   Musculoskeletal:  Positive for arthralgias, back pain, myalgias and neck pain.       Prior to Visit Medications    Medication Sig Taking? Authorizing Provider   predniSONE (DELTASONE) 5 MG tablet 2 bid x  5days, 1 tid x 5 days, 1 bid x 5d, 1 daily x 10d stop Yes Jp Tinsley, DO   Multiple Vitamin (MULTIVITAMIN ADULT PO) Take 1 tablet by mouth daily Yes ProviderErika MD   Omega 3 1000 MG CAPS Take by mouth Yes Erika Dorsey MD   NONFORMULARY Calcium  ? mg Yes Erika Dorsey MD   ibuprofen (ADVIL;MOTRIN) 200 MG tablet Take 4 tablets by mouth every 6 hours as needed for Pain 2 tab q am Yes Erika Dorsey MD   aspirin 81 MG EC tablet Take 1 tablet by mouth daily Yes Automatic Reconciliation, Ar   Cholecalciferol 50 MCG (2000) TABS Take 1 tablet by mouth daily Yes Automatic Reconciliation, Ar   denosumab (PROLIA) 60 MG/ML SOSY SC injection Inject 1 mL into the skin She had Prolia #2 on 22. Yes Automatic Reconciliation, Ar       Social History     Tobacco Use    Smoking status: Former     Current packs/day: 0.00     Average packs/day: 1 pack/day for 40.0 years (40.0 ttl pk-yrs)     Types: Cigarettes     Start date: 10/31/1960     Quit date: 10/31/2000     Years since quittin.6    Smokeless tobacco: Never   Vaping Use    Vaping Use: Never used   Substance Use Topics    Alcohol use: No    Drug use: No            PHYSICAL EXAMINATION:  [ INSTRUCTIONS:  \"[x]\" Indicates a positive item  \"[]\" Indicates a negative item  -- DELETE ALL ITEMS NOT EXAMINED]  Vital Signs: (As obtained by patient/caregiver or practitioner observation)    Blood pressure-  Heart

## 2024-07-01 ENCOUNTER — TELEPHONE (OUTPATIENT)
Dept: INTERNAL MEDICINE CLINIC | Facility: CLINIC | Age: 79
End: 2024-07-01

## 2024-07-01 NOTE — TELEPHONE ENCOUNTER
Yina can you try to call her back before you leave and advise what she should do     She called back at 4:20 pm

## 2024-07-01 NOTE — TELEPHONE ENCOUNTER
Advised if she is in excruciating pain she needs to go to the ER. Patient states she can not lift her arms, and she has not been able to shower. Patient states she is not going anywhere if she can not take a shower. Patient appointment changed from Wednesday to tomorrow with Dr. Tinsley. Patient notified not to take 12-16 ibuprofen today. Patient notified she can take 2 tablets of ibuprofen, and her prednisone.

## 2024-07-01 NOTE — TELEPHONE ENCOUNTER
Daughter called Pain is really bad does not know if she can wait until her Wed. Appointment can the nurse call her back     Should she go to the ER    predniSONE (DELTASONE) 5 MG tablet     Per daughter please call the patient back and advise

## 2024-07-02 ENCOUNTER — OFFICE VISIT (OUTPATIENT)
Dept: INTERNAL MEDICINE CLINIC | Facility: CLINIC | Age: 79
End: 2024-07-02
Payer: MEDICARE

## 2024-07-02 VITALS
SYSTOLIC BLOOD PRESSURE: 140 MMHG | BODY MASS INDEX: 29.95 KG/M2 | OXYGEN SATURATION: 97 % | HEART RATE: 87 BPM | HEIGHT: 63 IN | WEIGHT: 169 LBS | DIASTOLIC BLOOD PRESSURE: 68 MMHG | TEMPERATURE: 97.9 F

## 2024-07-02 DIAGNOSIS — M79.10 MYALGIA: ICD-10-CM

## 2024-07-02 DIAGNOSIS — M05.79 RHEUMATOID ARTHRITIS WITH RHEUMATOID FACTOR OF MULTIPLE SITES WITHOUT ORGAN OR SYSTEMS INVOLVEMENT (HCC): Primary | ICD-10-CM

## 2024-07-02 LAB
CK SERPL-CCNC: 29 U/L (ref 21–215)
ERYTHROCYTE [SEDIMENTATION RATE] IN BLOOD: 36 MM/HR (ref 0–30)

## 2024-07-02 PROCEDURE — 1123F ACP DISCUSS/DSCN MKR DOCD: CPT | Performed by: INTERNAL MEDICINE

## 2024-07-02 PROCEDURE — 99214 OFFICE O/P EST MOD 30 MIN: CPT | Performed by: INTERNAL MEDICINE

## 2024-07-02 PROCEDURE — 96372 THER/PROPH/DIAG INJ SC/IM: CPT | Performed by: INTERNAL MEDICINE

## 2024-07-02 RX ORDER — DEXAMETHASONE SODIUM PHOSPHATE 4 MG/ML
4 INJECTION, SOLUTION INTRA-ARTICULAR; INTRALESIONAL; INTRAMUSCULAR; INTRAVENOUS; SOFT TISSUE ONCE
Status: COMPLETED | OUTPATIENT
Start: 2024-07-02 | End: 2024-07-02

## 2024-07-02 RX ORDER — PREDNISONE 5 MG/1
5 TABLET ORAL DAILY
Qty: 30 TABLET | Refills: 0 | Status: SHIPPED | OUTPATIENT
Start: 2024-07-02 | End: 2024-08-01

## 2024-07-02 RX ORDER — PREDNISONE 20 MG/1
TABLET ORAL
Qty: 23 TABLET | Refills: 0 | Status: SHIPPED | OUTPATIENT
Start: 2024-07-02

## 2024-07-02 RX ADMIN — DEXAMETHASONE SODIUM PHOSPHATE 4 MG: 4 INJECTION, SOLUTION INTRA-ARTICULAR; INTRALESIONAL; INTRAMUSCULAR; INTRAVENOUS; SOFT TISSUE at 12:10

## 2024-07-02 ASSESSMENT — ENCOUNTER SYMPTOMS: BACK PAIN: 1

## 2024-07-02 NOTE — TELEPHONE ENCOUNTER
PHONE CALL to Dougie to check the status of PA for Ady. Spoke with Filippo. Need to fax to [phone]. Refaxed today

## 2024-07-02 NOTE — PROGRESS NOTES
Edelmira was seen today for follow-up.    Diagnoses and all orders for this visit:    Rheumatoid arthritis with rheumatoid factor of multiple sites without organ or systems involvement (HCC)  -     CK; Future  -     Cancel: Myoglobin, Blood; Future  -     Sedimentation Rate; Future  -     Rheumatoid Factor; Future  -     dexAMETHasone (DECADRON) injection 4 mg  -     Rheumatoid Factor  -     Sedimentation Rate  -     Cancel: Myoglobin, Blood  -     CK    Myalgia  -     CK; Future  -     Cancel: Myoglobin, Blood; Future  -     Sedimentation Rate; Future  -     Rheumatoid Factor; Future  -     dexAMETHasone (DECADRON) injection 4 mg  -     Rheumatoid Factor  -     Sedimentation Rate  -     Cancel: Myoglobin, Blood  -     CK    Other orders  -     predniSONE (DELTASONE) 20 MG tablet; 1 tid x 3d, 1 bid x 4d, 1 daily x 4d, 1/2  tab x  4d, then switch 5 mg daily  -     predniSONE (DELTASONE) 5 MG tablet; Take 1 tablet by mouth daily  -     Rheumatoid Factor, Qt  -     Myoglobin, Blood          Edelmira Jaquez is a 78 y.o. female    Chief Complaint   Patient presents with    Follow-up   Steroid increase helped   Hurting neck arms        Office Visit on 10/10/2023   Component Date Value Ref Range Status    Hemoglobin A1C 10/10/2023 5.7 (H)  4.8 - 5.6 % Final    Estimated Avg Glucose 10/10/2023 117  mg/dL Final    Comment: Reference Range  Normal: 4.8-5.6  Diabetic >=6.5%  Normal       <5.7%      TSH, 3rd Generation 10/10/2023 0.857  0.358 - 3.740 uIU/mL Final    Cholesterol, Total 10/10/2023 248 (H)  <200 MG/DL Final    Comment: Borderline High: 200-239 mg/dL  High: Greater than or equal to 240 mg/dL      Triglycerides 10/10/2023 139  35 - 150 MG/DL Final    Comment: Borderline High: 150-199 mg/dL, High: 200-499 mg/dL  Very High: Greater than or equal to 500 mg/dL      HDL 10/10/2023 59  40 - 60 MG/DL Final    LDL Calculated 10/10/2023 161.2 (H)  <100 MG/DL Final    Comment: Near Optimal: 100-129 mg/dL  Borderline High: 130-159,

## 2024-07-03 LAB
RHEUMATOID FACT SER QL LA: POSITIVE
RHEUMATOID FACT TITR SER LA: NORMAL {TITER}

## 2024-07-05 LAB — MYOGLOBIN SERPL-MCNC: 33 NG/ML (ref 25–58)

## 2024-07-05 NOTE — TELEPHONE ENCOUNTER
PHONE CALL from Filippo requesting a call back.   PHONE CALL to Filippo [phone]. The date of administration on the form is June 6 (I forgot to change the date when I refaxed with new fax #. Need to addend and refax. Will send on Monday when in the office.

## 2024-07-22 NOTE — TELEPHONE ENCOUNTER
Form was re-faxed 7/8/24. No response received as of yet.   PHONE CALL to Filippo Erazo to check the status of PA for Prolia.   She does not show any requests in their system. Started PA on the phone. Hard to respond to questions as pt off treatment for 2 years. No BMD since 2021, so cannot say improvement on BMD. Patient fractured ankle recently, but this was off of Prolia.   Submitting request for review. Will hear response in 1 day. May request clinicals.   [employer]   tracking # [id number].   Fax clinicals to Filippo [phone]  Direct phone # [phone]

## 2024-07-29 NOTE — TELEPHONE ENCOUNTER
PA has been approved for Prolia  by Filippo Erazo, 7/22/24- 7/21/25, Auth # [id number]. 3 doses approved for 365 days.     PHONE CALL to patient to schedule the injection. LEFT MESSAGE on her VM to call me back. Tried to reach her son as well. LEFT MESSAGE on his VM also.

## 2024-08-27 RX ORDER — PREDNISONE 20 MG/1
TABLET ORAL
Qty: 23 TABLET | Refills: 0 | Status: SHIPPED | OUTPATIENT
Start: 2024-08-27

## 2024-08-27 NOTE — TELEPHONE ENCOUNTER
Patients daughter called and states that the patient is having another flare up and would like to know if another prescription for prednisone until she can get in the the rheumatologist. Please Advise.         [facility] on [address]

## 2024-09-25 ENCOUNTER — OFFICE VISIT (OUTPATIENT)
Dept: RHEUMATOLOGY | Age: 79
End: 2024-09-25
Payer: MEDICARE

## 2024-09-25 VITALS
RESPIRATION RATE: 12 BRPM | HEIGHT: 63 IN | SYSTOLIC BLOOD PRESSURE: 138 MMHG | WEIGHT: 173.4 LBS | OXYGEN SATURATION: 94 % | DIASTOLIC BLOOD PRESSURE: 88 MMHG | HEART RATE: 91 BPM | BODY MASS INDEX: 30.72 KG/M2

## 2024-09-25 DIAGNOSIS — Z01.89 ENCOUNTER FOR OTHER SPECIFIED SPECIAL EXAMINATIONS: ICD-10-CM

## 2024-09-25 DIAGNOSIS — E28.319 EARLY MENOPAUSE OCCURRING IN PATIENT AGE YOUNGER THAN 45 YEARS: ICD-10-CM

## 2024-09-25 DIAGNOSIS — M05.9 SEROPOSITIVE RHEUMATOID ARTHRITIS (HCC): ICD-10-CM

## 2024-09-25 DIAGNOSIS — Z87.81 HISTORY OF ANKLE FRACTURE: ICD-10-CM

## 2024-09-25 DIAGNOSIS — Z79.52 CURRENT CHRONIC USE OF SYSTEMIC STEROIDS: ICD-10-CM

## 2024-09-25 DIAGNOSIS — Z85.43 HISTORY OF OVARIAN CANCER: ICD-10-CM

## 2024-09-25 DIAGNOSIS — M81.0 POSTMENOPAUSAL OSTEOPOROSIS: Primary | ICD-10-CM

## 2024-09-25 DIAGNOSIS — M81.0 POSTMENOPAUSAL OSTEOPOROSIS: ICD-10-CM

## 2024-09-25 DIAGNOSIS — Z79.1 NSAID LONG-TERM USE: ICD-10-CM

## 2024-09-25 LAB
25(OH)D3 SERPL-MCNC: 59.1 NG/ML (ref 30–100)
ALBUMIN SERPL-MCNC: 3.4 G/DL (ref 3.2–4.6)
ALBUMIN/GLOB SERPL: 0.8 (ref 1–1.9)
ALP SERPL-CCNC: 82 U/L (ref 35–104)
ALT SERPL-CCNC: 19 U/L (ref 8–45)
ANION GAP SERPL CALC-SCNC: 17 MMOL/L (ref 9–18)
AST SERPL-CCNC: 19 U/L (ref 15–37)
BILIRUB SERPL-MCNC: 0.4 MG/DL (ref 0–1.2)
BUN SERPL-MCNC: 19 MG/DL (ref 8–23)
CALCIUM SERPL-MCNC: 9 MG/DL (ref 8.8–10.2)
CALCIUM SERPL-MCNC: 9.2 MG/DL (ref 8.8–10.2)
CHLORIDE SERPL-SCNC: 101 MMOL/L (ref 98–107)
CO2 SERPL-SCNC: 19 MMOL/L (ref 20–28)
CREAT SERPL-MCNC: 0.66 MG/DL (ref 0.6–1.1)
ERYTHROCYTE [SEDIMENTATION RATE] IN BLOOD: 49 MM/HR (ref 0–30)
GLOBULIN SER CALC-MCNC: 4.1 G/DL (ref 2.3–3.5)
GLUCOSE SERPL-MCNC: 103 MG/DL (ref 70–99)
HAV IGM SER QL: NONREACTIVE
HBV CORE IGM SER QL: NONREACTIVE
HBV SURFACE AG SER QL: NONREACTIVE
HCV AB SER QL: NONREACTIVE
MAGNESIUM SERPL-MCNC: 2.3 MG/DL (ref 1.8–2.4)
PHOSPHATE SERPL-MCNC: 3.5 MG/DL (ref 2.5–4.5)
POTASSIUM SERPL-SCNC: 4.2 MMOL/L (ref 3.5–5.1)
PROT SERPL-MCNC: 7.5 G/DL (ref 6.3–8.2)
PTH-INTACT SERPL-MCNC: 31.5 PG/ML (ref 15–65)
SODIUM SERPL-SCNC: 137 MMOL/L (ref 136–145)

## 2024-09-25 PROCEDURE — 99215 OFFICE O/P EST HI 40 MIN: CPT | Performed by: INTERNAL MEDICINE

## 2024-09-25 PROCEDURE — 96372 THER/PROPH/DIAG INJ SC/IM: CPT | Performed by: INTERNAL MEDICINE

## 2024-09-25 PROCEDURE — 1123F ACP DISCUSS/DSCN MKR DOCD: CPT | Performed by: INTERNAL MEDICINE

## 2024-09-25 RX ORDER — METHYLPREDNISOLONE ACETATE 40 MG/ML
40 INJECTION, SUSPENSION INTRA-ARTICULAR; INTRALESIONAL; INTRAMUSCULAR; SOFT TISSUE ONCE
Status: COMPLETED | OUTPATIENT
Start: 2024-09-25 | End: 2024-09-25

## 2024-09-25 RX ADMIN — METHYLPREDNISOLONE ACETATE 40 MG: 40 INJECTION, SUSPENSION INTRA-ARTICULAR; INTRALESIONAL; INTRAMUSCULAR; SOFT TISSUE at 14:07

## 2024-09-25 ASSESSMENT — RHEUMATOLOGY NEW PATIENT QUESTIONNAIRE
UNUSUAL FATIGUE: Y
INCREASED SUSCEPTIBILITY TO INFECTION: N
DIFFICULTY FALLING ASLEEP: Y
EASILY LOSING TEMPER: N
MEMORY LOSS: N
ANXIETY: N
DIFFICULTY STAYING ASLEEP: Y
ABNORMAL URINE: N
DEPRESSION: N
EYE DRYNESS: Y
SWOLLEN OR TENDER GLANDS: N
DIFFICULTY BREATHING LYING DOWN: N
NIGHT SWEATS: Y
JOINT PAIN: Y
HEARTBURN OR REFLUX: N
SKIN TIGHTNESS: N
PAIN OR BURNING ON URINATION: N
DOUBLE OR BLURRED VISION: Y
UNUSUAL BLEEDING: N
SUN SENSITIVE (SUN ALLERGY): N
HEADACHES: N
SKIN REDNESS: N
COUGH: N
RASH: N
DRYNESS OF MOUTH: N
JOINT SWELLING: N
PERSISTENT DIARRHEA: N
SWOLLEN LEGS OR FEET: N
JAUNDICE: N
UNEXPLAINED HEARING LOSS: N
EASY BRUISING: N
VAGINAL DRYNESS: N
ANEMIA: N
COLOR CHANGES OF HANDS OR FEET IN THE COLD: N
LOSS OF VISION: N
STOMACH PAIN: N
BLACK STOOLS: N
FEVER: N
BEHAVIORAL CHANGES: N
MUSCLE WEAKNESS: Y
EXCESSIVE HAIR LOSS (MORE THAN YOUR NORM): N
UNEXPLAINED WEIGHT CHANGE: N
EYE PAIN: N
DIFFICULTY SWALLOWING: N
NODULES/BUMPS: N
VOMITING OF BLOOD OR COFFEE GROUND CONSISTENCY MATERIAL: N
AGITATION: N
MORNING STIFFNESS: Y
RASH OR ULCERS: N
NUMBNESS OR TINGLING IN HANDS OR FEET: Y
EYE REDNESS: N
CHEST PAIN: N
HOW WOULD YOU DESCRIBE YOUR STIFFNESS ON AVERAGE: VERY SEVERE
LOSS OF CONSCIOUSNESS: N
FAINTING: N
SORES IN MOUTH OR NOSE: N
UNUSUALLY RAPID OR SLOWED HEART RATE: N
BLOOD IN STOOLS: N
SEIZURES: N
NAUSEA: N
SHORTNESS OF BREATH: N
MORNING STIFFNESS IN LOWER BACK: Y
HOARSE VOICE: N

## 2024-09-25 ASSESSMENT — ROUTINE ASSESSMENT OF PATIENT INDEX DATA (RAPID3)
ON A SCALE OF ONE TO TEN, HOW DIFFICULT WAS IT FOR YOU TO COMPLETE THE LISTED DAILY PHYSICAL TASKS OVER THE LAST WEEK: 1.9
ON A SCALE OF ONE TO TEN, HOW MUCH PAIN HAVE YOU HAD BECAUSE OF YOUR CONDITION OVER THE PAST WEEK?: 10
ON A SCALE OF ONE TO TEN, CONSIDERING ALL THE WAYS IN WHICH ILLNESS AND HEALTH CONDITIONS MAY AFFECT YOU AT THIS TIME, PLEASE INDICATE BELOW HOW YOU ARE DOING:: 10
ON A SCALE OF ONE TO TEN, HOW MUCH OF A PROBLEM HAS UNUSUAL FATIGUE OR TIREDNESS BEEN FOR YOU OVER THE PAST WEEK?: 5
WHEN YOU AWAKENED IN THE MORNING OVER THE LAST WEEK, PLEASE INDICATE THE AMOUNT OF TIME IT TAKES UNTIL YOU ARE AS LIMBER AS YOU WILL BE FOR THE DAY: > 1 HOUR

## 2024-09-26 ENCOUNTER — TELEPHONE (OUTPATIENT)
Dept: RHEUMATOLOGY | Age: 79
End: 2024-09-26

## 2024-09-26 DIAGNOSIS — M15.9 GENERALIZED OSTEOARTHRITIS OF MULTIPLE SITES: ICD-10-CM

## 2024-09-26 DIAGNOSIS — M05.9 SEROPOSITIVE RHEUMATOID ARTHRITIS (HCC): ICD-10-CM

## 2024-09-26 DIAGNOSIS — D70.8 OTHER NEUTROPENIA (HCC): Primary | ICD-10-CM

## 2024-09-26 LAB
BASOPHILS # BLD: 0 K/UL (ref 0–0.2)
BASOPHILS NFR BLD: 1 % (ref 0–2)
DIFFERENTIAL METHOD BLD: ABNORMAL
EOSINOPHIL # BLD: 0 K/UL (ref 0–0.8)
EOSINOPHIL NFR BLD: 2 % (ref 0.5–7.8)
ERYTHROCYTE [DISTWIDTH] IN BLOOD BY AUTOMATED COUNT: 14 % (ref 11.9–14.6)
HCT VFR BLD AUTO: 42.2 % (ref 35.8–46.3)
HGB BLD-MCNC: 13.4 G/DL (ref 11.7–15.4)
IMM GRANULOCYTES # BLD AUTO: 0 K/UL (ref 0–0.5)
IMM GRANULOCYTES NFR BLD AUTO: 0 % (ref 0–5)
LYMPHOCYTES # BLD: 1.1 K/UL (ref 0.5–4.6)
LYMPHOCYTES NFR BLD: 66 % (ref 13–44)
MCH RBC QN AUTO: 28.4 PG (ref 26.1–32.9)
MCHC RBC AUTO-ENTMCNC: 31.8 G/DL (ref 31.4–35)
MCV RBC AUTO: 89.4 FL (ref 82–102)
MONOCYTES # BLD: 0.3 K/UL (ref 0.1–1.3)
MONOCYTES NFR BLD: 17 % (ref 4–12)
NEUTS SEG # BLD: 0.2 K/UL (ref 1.7–8.2)
NEUTS SEG NFR BLD: 14 % (ref 43–78)
NRBC # BLD: 0 K/UL (ref 0–0.2)
PLATELET # BLD AUTO: 185 K/UL (ref 150–450)
PMV BLD AUTO: 9.6 FL (ref 9.4–12.3)
RBC # BLD AUTO: 4.72 M/UL (ref 4.05–5.2)
WBC # BLD AUTO: 1.7 K/UL (ref 4.3–11.1)

## 2024-09-27 LAB — CRP SERPL-MCNC: 10 MG/L (ref 0–10)

## 2024-09-30 ENCOUNTER — APPOINTMENT (OUTPATIENT)
Dept: GENERAL RADIOLOGY | Age: 79
End: 2024-09-30
Payer: MEDICARE

## 2024-09-30 ENCOUNTER — HOSPITAL ENCOUNTER (EMERGENCY)
Age: 79
Discharge: HOME | End: 2024-09-30
Payer: MEDICARE

## 2024-09-30 VITALS
HEIGHT: 63 IN | BODY MASS INDEX: 30.65 KG/M2 | OXYGEN SATURATION: 95 % | HEART RATE: 84 BPM | SYSTOLIC BLOOD PRESSURE: 152 MMHG | DIASTOLIC BLOOD PRESSURE: 73 MMHG | TEMPERATURE: 98 F | WEIGHT: 173 LBS | RESPIRATION RATE: 15 BRPM

## 2024-09-30 DIAGNOSIS — D70.9 NEUTROPENIC FEVER (HCC): Primary | ICD-10-CM

## 2024-09-30 DIAGNOSIS — R50.81 NEUTROPENIC FEVER (HCC): Primary | ICD-10-CM

## 2024-09-30 LAB
ALBUMIN SERPL-MCNC: 3.4 G/DL (ref 3.2–4.6)
ALBUMIN/GLOB SERPL: 0.8 (ref 1–1.9)
ALP SERPL-CCNC: 72 U/L (ref 35–104)
ALT SERPL-CCNC: 13 U/L (ref 8–45)
ANION GAP SERPL CALC-SCNC: 11 MMOL/L (ref 9–18)
APPEARANCE UR: CLEAR
AST SERPL-CCNC: 24 U/L (ref 15–37)
BASOPHILS # BLD: 0 K/UL (ref 0–0.2)
BASOPHILS NFR BLD: 1 % (ref 0–2)
BILIRUB SERPL-MCNC: 0.4 MG/DL (ref 0–1.2)
BILIRUB UR QL: NEGATIVE
BUN SERPL-MCNC: 15 MG/DL (ref 8–23)
CALCIUM SERPL-MCNC: 9.1 MG/DL (ref 8.8–10.2)
CHLORIDE SERPL-SCNC: 104 MMOL/L (ref 98–107)
CO2 SERPL-SCNC: 26 MMOL/L (ref 20–28)
COLOR UR: ABNORMAL
CREAT SERPL-MCNC: 0.6 MG/DL (ref 0.6–1.1)
DIFFERENTIAL METHOD BLD: ABNORMAL
EOSINOPHIL # BLD: 0 K/UL (ref 0–0.8)
EOSINOPHIL NFR BLD: 1 % (ref 0.5–7.8)
ERYTHROCYTE [DISTWIDTH] IN BLOOD BY AUTOMATED COUNT: 14 % (ref 11.9–14.6)
FLUAV RNA SPEC QL NAA+PROBE: NOT DETECTED
FLUBV RNA SPEC QL NAA+PROBE: NOT DETECTED
GLOBULIN SER CALC-MCNC: 4.4 G/DL (ref 2.3–3.5)
GLUCOSE SERPL-MCNC: 92 MG/DL (ref 70–99)
GLUCOSE UR STRIP.AUTO-MCNC: NEGATIVE MG/DL
HCT VFR BLD AUTO: 41.5 % (ref 35.8–46.3)
HGB BLD-MCNC: 13.2 G/DL (ref 11.7–15.4)
HGB UR QL STRIP: NEGATIVE
IMM GRANULOCYTES # BLD AUTO: 0 K/UL (ref 0–0.5)
IMM GRANULOCYTES NFR BLD AUTO: 0 % (ref 0–5)
KETONES UR QL STRIP.AUTO: ABNORMAL MG/DL
LACTATE SERPL-SCNC: 1 MMOL/L (ref 0.5–2)
LEUKOCYTE ESTERASE UR QL STRIP.AUTO: NEGATIVE
LYMPHOCYTES # BLD: 2 K/UL (ref 0.5–4.6)
LYMPHOCYTES NFR BLD: 79 % (ref 13–44)
M TB IFN-G BLD-IMP: NEGATIVE
M TB IFN-G CD4+ T-CELLS BLD-ACNC: 0.05 IU/ML
M TBIFN-G CD4+ CD8+T-CELLS BLD-ACNC: 0.05 IU/ML
MCH RBC QN AUTO: 28.4 PG (ref 26.1–32.9)
MCHC RBC AUTO-ENTMCNC: 31.8 G/DL (ref 31.4–35)
MCV RBC AUTO: 89.2 FL (ref 82–102)
MONOCYTES # BLD: 0.4 K/UL (ref 0.1–1.3)
MONOCYTES NFR BLD: 17 % (ref 4–12)
NEUTS SEG # BLD: 0.1 K/UL (ref 1.7–8.2)
NEUTS SEG NFR BLD: 2 % (ref 43–78)
NITRITE UR QL STRIP.AUTO: NEGATIVE
NRBC # BLD: 0 K/UL (ref 0–0.2)
PH UR STRIP: 5.5 (ref 5–9)
PLATELET # BLD AUTO: 177 K/UL (ref 150–450)
PLATELET COMMENT: ADEQUATE
PMV BLD AUTO: 9.4 FL (ref 9.4–12.3)
POTASSIUM SERPL-SCNC: 3.5 MMOL/L (ref 3.5–5.1)
PROCALCITONIN SERPL-MCNC: 0.03 NG/ML (ref 0–0.1)
PROT SERPL-MCNC: 7.9 G/DL (ref 6.3–8.2)
PROT UR STRIP-MCNC: NEGATIVE MG/DL
QUANTIFERON CRITERIA: NORMAL
QUANTIFERON MITOGEN VALUE: 7.32 IU/ML
QUANTIFERON NIL VALUE: 0.04 IU/ML
RBC # BLD AUTO: 4.65 M/UL (ref 4.05–5.2)
RBC MORPH BLD: ABNORMAL
SARS-COV-2 RDRP RESP QL NAA+PROBE: NOT DETECTED
SODIUM SERPL-SCNC: 141 MMOL/L (ref 136–145)
SOURCE: NORMAL
SP GR UR REFRACTOMETRY: 1.02 (ref 1–1.02)
UROBILINOGEN UR QL STRIP.AUTO: 0.2 EU/DL (ref 0.2–1)
WBC # BLD AUTO: 2.5 K/UL (ref 4.3–11.1)
WBC MORPH BLD: ABNORMAL

## 2024-09-30 PROCEDURE — 87086 URINE CULTURE/COLONY COUNT: CPT

## 2024-09-30 PROCEDURE — 83605 ASSAY OF LACTIC ACID: CPT

## 2024-09-30 PROCEDURE — 80053 COMPREHEN METABOLIC PANEL: CPT

## 2024-09-30 PROCEDURE — 6360000002 HC RX W HCPCS: Performed by: PHYSICIAN ASSISTANT

## 2024-09-30 PROCEDURE — 99284 EMERGENCY DEPT VISIT MOD MDM: CPT

## 2024-09-30 PROCEDURE — 2580000003 HC RX 258: Performed by: PHYSICIAN ASSISTANT

## 2024-09-30 PROCEDURE — 96375 TX/PRO/DX INJ NEW DRUG ADDON: CPT

## 2024-09-30 PROCEDURE — 87502 INFLUENZA DNA AMP PROBE: CPT

## 2024-09-30 PROCEDURE — 81003 URINALYSIS AUTO W/O SCOPE: CPT

## 2024-09-30 PROCEDURE — 87635 SARS-COV-2 COVID-19 AMP PRB: CPT

## 2024-09-30 PROCEDURE — 96374 THER/PROPH/DIAG INJ IV PUSH: CPT

## 2024-09-30 PROCEDURE — 84145 PROCALCITONIN (PCT): CPT

## 2024-09-30 PROCEDURE — 87040 BLOOD CULTURE FOR BACTERIA: CPT

## 2024-09-30 PROCEDURE — 71046 X-RAY EXAM CHEST 2 VIEWS: CPT

## 2024-09-30 PROCEDURE — 85025 COMPLETE CBC W/AUTO DIFF WBC: CPT

## 2024-09-30 RX ORDER — ONDANSETRON 2 MG/ML
4 INJECTION INTRAMUSCULAR; INTRAVENOUS ONCE
Status: COMPLETED | OUTPATIENT
Start: 2024-09-30 | End: 2024-09-30

## 2024-09-30 RX ORDER — HYDROCODONE BITARTRATE AND ACETAMINOPHEN 5; 325 MG/1; MG/1
1 TABLET ORAL EVERY 6 HOURS PRN
Qty: 10 TABLET | Refills: 0 | Status: SHIPPED | OUTPATIENT
Start: 2024-09-30 | End: 2024-10-03 | Stop reason: SDUPTHER

## 2024-09-30 RX ORDER — CEFDINIR 300 MG/1
300 CAPSULE ORAL 2 TIMES DAILY
Qty: 20 CAPSULE | Refills: 0 | Status: SHIPPED | OUTPATIENT
Start: 2024-09-30 | End: 2024-10-03 | Stop reason: SDUPTHER

## 2024-09-30 RX ORDER — MORPHINE SULFATE 4 MG/ML
4 INJECTION, SOLUTION INTRAMUSCULAR; INTRAVENOUS
Status: COMPLETED | OUTPATIENT
Start: 2024-09-30 | End: 2024-09-30

## 2024-09-30 RX ORDER — 0.9 % SODIUM CHLORIDE 0.9 %
1000 INTRAVENOUS SOLUTION INTRAVENOUS ONCE
Status: COMPLETED | OUTPATIENT
Start: 2024-09-30 | End: 2024-09-30

## 2024-09-30 RX ADMIN — WATER 125 MG: 1 INJECTION INTRAMUSCULAR; INTRAVENOUS; SUBCUTANEOUS at 17:03

## 2024-09-30 RX ADMIN — SODIUM CHLORIDE 1000 ML: 9 INJECTION, SOLUTION INTRAVENOUS at 17:02

## 2024-09-30 RX ADMIN — MORPHINE SULFATE 4 MG: 4 INJECTION, SOLUTION INTRAMUSCULAR; INTRAVENOUS at 17:05

## 2024-09-30 RX ADMIN — WATER 1000 MG: 1 INJECTION INTRAMUSCULAR; INTRAVENOUS; SUBCUTANEOUS at 18:54

## 2024-09-30 RX ADMIN — ONDANSETRON 4 MG: 2 INJECTION, SOLUTION INTRAMUSCULAR; INTRAVENOUS at 17:04

## 2024-09-30 ASSESSMENT — PAIN SCALES - GENERAL: PAINLEVEL_OUTOF10: 10

## 2024-09-30 ASSESSMENT — LIFESTYLE VARIABLES
HOW MANY STANDARD DRINKS CONTAINING ALCOHOL DO YOU HAVE ON A TYPICAL DAY: PATIENT DOES NOT DRINK
HOW OFTEN DO YOU HAVE A DRINK CONTAINING ALCOHOL: NEVER

## 2024-09-30 ASSESSMENT — ENCOUNTER SYMPTOMS
RESPIRATORY NEGATIVE: 1
GASTROINTESTINAL NEGATIVE: 1

## 2024-09-30 ASSESSMENT — PAIN - FUNCTIONAL ASSESSMENT: PAIN_FUNCTIONAL_ASSESSMENT: 0-10
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
## 2024-09-30 NOTE — ED PROVIDER NOTES
Emergency Department Provider Note       PCP: Jp Tinsley DO   Age: 78 y.o.   Sex: female     DISPOSITION Decision To Discharge 09/30/2024 06:39:56 PM  Condition at Disposition: Data Unavailable       ICD-10-CM    1. Neutropenic fever (HCC)  D70.9 HYDROcodone-acetaminophen (NORCO) 5-325 MG per tablet    R50.81           Medical Decision Making     78-year-old female presents with diffuse arthralgias that have been present for months, but outpatient labs showing a leukocytosis of 1.7.  Because she had fever yesterday she was instructed to come to the emergency department.  She has history of RA which I believe is where all of her joint pain is coming from.  It does not sound acute.  She underwent septic workup.  White blood cell count 2.5 here.  She is neutropenic.  I reached out to hematology on-call to see if patient needed IV antibiotics and admission.  They said if the patient looks okay she can follow-up outpatient but wanted to go ahead and get a smear.  This has been ordered.  Patient looks great she is awake alert and talkative feeling much better.  No obvious source of infection at this time.  She has an appointment with hematology in 3 days.  Recommended she keep this appointment and we will go ahead and place on empiric antibiotics outpatient.  Patient is agreeable to this plan.  Family at bedside and agreeable to plan.  She is eager for discharge at this time.  Strict return precautions given.  ED Course as of 09/30/24 1843   Mon Sep 30, 2024   1723 WBC(!): 2.5 [HEIDI]   1819 Discussed with hematology on-call who believes discharge is appropriate if no evidence of infection and patient well-appearing.  She has follow-up Thursday.  Will go ahead and start empirically on antibiotics and plan for discharge.  They also recommended ordering a smear so that those results are available when she sees hematology Thursday. [HEIDI]   1826 XR CHEST (2 VW)  No acute abnormality by my read.  Awaiting formal radiology

## 2024-10-01 LAB — PATH REV BLD -IMP: NORMAL

## 2024-10-01 NOTE — PROGRESS NOTES
NEW PATIENT INTAKE    Referral Diagnosis: Other neutropenia; Seropositive rheumatoid arthritis    Referring Provider:  Cammy Maloney MD    Primary Care Provider: Jp Tinsley DO    Family History of Cancer/ Hematology Disorders: Mother with colon cancer    Presenting Symptoms:  Fatigue, fever, arthralgias    Chronological History of Pertinent Events:  Ms. Jaquez is a 78-year-old white female referred for neutropenia. PMH significant for MS (diagnosed in 1977- no treatment), seropositive rheumatoid arthritis, osteoporosis, hearing loss, and ovarian cancer s/p hysterectomy ~ 1997.     9/25/24: Rheumatology f/u for seropositive rheumatoid arthritis (diagnosed in 2020) and osteoporosis. Pt has not used DMARD therapy before. RA is being treated with prednisone (currently completing steroid pack which started at 60 Mg and found that any dose less than 30 Mg does not control symptoms). Prolia was previously used x 2 doses ending in 2022 for osteoporosis. Pt is counseled to consider restarting Prolia due to severity of her osteoporosis.   -Plan for methotrexate 10-12.5 Mg weekly plus daily folic acid based on labs.  -IM Depo-Medrol 40 Mg given once now. Consider PO pred if indicated for symptoms  -Laboratory results showed WBC 1.7, neutrophils 14, lymphocytes 66, monocytes 17, ANC 0.2; sed rate 49, CRP WNL; hepatitis panel nonreactive; Mag, phos, vit D, calcium, PTH intact, incubated quantiferon WNL; CO2 19, glucose 103, globulin 4.1, A/G ratio 0.8 (Epic/Labs)     9/26/24: Dr. Maloney called pt's daughter regarding abnormal neutrophil count - , wbc 1.7; she was advised pt to go to er/UTC if fever lasting +1 hour or if infection to have treatment administered.  -Abdominal US ordered to check for splenomegaly (Scheduled for 10/3/24)   -Referred to [facility]  -Review of records indicates that pt's white count has been persistently low since at least August of 2022, ranging from 1.7 to 3.3. ANC has also been

## 2024-10-02 ENCOUNTER — TELEPHONE (OUTPATIENT)
Dept: ONCOLOGY | Age: 79
End: 2024-10-02

## 2024-10-02 DIAGNOSIS — G35 MS (MULTIPLE SCLEROSIS) (HCC): Primary | ICD-10-CM

## 2024-10-02 NOTE — TELEPHONE ENCOUNTER
CBC and CMP completed in ER on 9/30.  Informed ok to not repeat labs and come to OV.  Verbalized understanding.

## 2024-10-02 NOTE — TELEPHONE ENCOUNTER
-CDiff neg  -Patient states this is a chronic problem for him and Pepto-Bismol helps, which has been ordered   Physician provider: Dr. Hinds  Reason for today's call (Please detail here patients chief complaint): lab appt  Last office visit:n/a  Patient Callback Number: [phone]  Was callback number verified?: Yes  Preferred pharmacy (If refill request): n/a  Calls to office within the last 48 hours?:No    Patient notified that their information will be routed to the [facility] clinical triage team for review. Patient is advised that they will receive a phone call from the triage department. If symptom related and symptoms worsen before receiving a call back, the patient has been advised to proceed to the nearest ED.          Pt had labs drawn 10/01 at [facility] . Pt's daughter would like to know if pt will still need labs done tomorrow at 1034    [phone] Pt's [facility] phone

## 2024-10-03 ENCOUNTER — OFFICE VISIT (OUTPATIENT)
Dept: INTERNAL MEDICINE CLINIC | Facility: CLINIC | Age: 79
End: 2024-10-03

## 2024-10-03 ENCOUNTER — HOSPITAL ENCOUNTER (OUTPATIENT)
Dept: ULTRASOUND IMAGING | Age: 79
Discharge: HOME | End: 2024-10-06
Attending: INTERNAL MEDICINE
Payer: MEDICARE

## 2024-10-03 ENCOUNTER — OFFICE VISIT (OUTPATIENT)
Dept: ONCOLOGY | Age: 79
End: 2024-10-03
Payer: MEDICARE

## 2024-10-03 VITALS
DIASTOLIC BLOOD PRESSURE: 70 MMHG | HEART RATE: 85 BPM | SYSTOLIC BLOOD PRESSURE: 130 MMHG | HEIGHT: 63 IN | WEIGHT: 177 LBS | TEMPERATURE: 97.9 F | OXYGEN SATURATION: 96 % | BODY MASS INDEX: 31.36 KG/M2

## 2024-10-03 VITALS
OXYGEN SATURATION: 93 % | RESPIRATION RATE: 16 BRPM | HEIGHT: 63 IN | WEIGHT: 177 LBS | SYSTOLIC BLOOD PRESSURE: 146 MMHG | TEMPERATURE: 97.5 F | BODY MASS INDEX: 31.36 KG/M2 | HEART RATE: 77 BPM | DIASTOLIC BLOOD PRESSURE: 78 MMHG

## 2024-10-03 DIAGNOSIS — D70.9 NEUTROPENIA, UNSPECIFIED TYPE (HCC): Primary | ICD-10-CM

## 2024-10-03 DIAGNOSIS — R50.81 NEUTROPENIC FEVER (HCC): ICD-10-CM

## 2024-10-03 DIAGNOSIS — D70.8 OTHER NEUTROPENIA (HCC): ICD-10-CM

## 2024-10-03 DIAGNOSIS — R73.01 IMPAIRED FASTING GLUCOSE: Primary | ICD-10-CM

## 2024-10-03 DIAGNOSIS — Z00.00 LABORATORY EXAMINATION ORDERED AS PART OF A ROUTINE GENERAL MEDICAL EXAMINATION: ICD-10-CM

## 2024-10-03 DIAGNOSIS — E78.5 ELEVATED LIPIDS: ICD-10-CM

## 2024-10-03 DIAGNOSIS — D70.9 NEUTROPENIC FEVER (HCC): ICD-10-CM

## 2024-10-03 DIAGNOSIS — M05.79 RHEUMATOID ARTHRITIS INVOLVING MULTIPLE SITES WITH POSITIVE RHEUMATOID FACTOR (HCC): ICD-10-CM

## 2024-10-03 DIAGNOSIS — M25.50 ARTHRALGIA, UNSPECIFIED JOINT: ICD-10-CM

## 2024-10-03 LAB
BACTERIA SPEC CULT: NORMAL
SERVICE CMNT-IMP: NORMAL

## 2024-10-03 PROCEDURE — 1123F ACP DISCUSS/DSCN MKR DOCD: CPT | Performed by: INTERNAL MEDICINE

## 2024-10-03 PROCEDURE — 99204 OFFICE O/P NEW MOD 45 MIN: CPT | Performed by: INTERNAL MEDICINE

## 2024-10-03 PROCEDURE — 76700 US EXAM ABDOM COMPLETE: CPT

## 2024-10-03 RX ORDER — CEFDINIR 300 MG/1
300 CAPSULE ORAL 2 TIMES DAILY
Qty: 20 CAPSULE | Refills: 0 | Status: SHIPPED | OUTPATIENT
Start: 2024-10-03 | End: 2024-10-13

## 2024-10-03 RX ORDER — HYDROCODONE BITARTRATE AND ACETAMINOPHEN 5; 325 MG/1; MG/1
1 TABLET ORAL EVERY 6 HOURS PRN
Qty: 20 TABLET | Refills: 0 | Status: SHIPPED | OUTPATIENT
Start: 2024-10-03 | End: 2024-10-11 | Stop reason: SDUPTHER

## 2024-10-03 SDOH — ECONOMIC STABILITY: FOOD INSECURITY: WITHIN THE PAST 12 MONTHS, THE FOOD YOU BOUGHT JUST DIDN'T LAST AND YOU DIDN'T HAVE MONEY TO GET MORE.: PATIENT DECLINED

## 2024-10-03 SDOH — ECONOMIC STABILITY: FOOD INSECURITY: WITHIN THE PAST 12 MONTHS, THE FOOD YOU BOUGHT JUST DIDN'T LAST AND YOU DIDN'T HAVE MONEY TO GET MORE.: NEVER TRUE

## 2024-10-03 SDOH — ECONOMIC STABILITY: INCOME INSECURITY: HOW HARD IS IT FOR YOU TO PAY FOR THE VERY BASICS LIKE FOOD, HOUSING, MEDICAL CARE, AND HEATING?: SOMEWHAT HARD

## 2024-10-03 SDOH — ECONOMIC STABILITY: FOOD INSECURITY: WITHIN THE PAST 12 MONTHS, YOU WORRIED THAT YOUR FOOD WOULD RUN OUT BEFORE YOU GOT MONEY TO BUY MORE.: SOMETIMES TRUE

## 2024-10-03 SDOH — ECONOMIC STABILITY: TRANSPORTATION INSECURITY
IN THE PAST 12 MONTHS, HAS LACK OF TRANSPORTATION KEPT YOU FROM MEETINGS, WORK, OR FROM GETTING THINGS NEEDED FOR DAILY LIVING?: YES

## 2024-10-03 SDOH — ECONOMIC STABILITY: FOOD INSECURITY: WITHIN THE PAST 12 MONTHS, YOU WORRIED THAT YOUR FOOD WOULD RUN OUT BEFORE YOU GOT MONEY TO BUY MORE.: PATIENT DECLINED

## 2024-10-03 ASSESSMENT — PATIENT HEALTH QUESTIONNAIRE - PHQ9
SUM OF ALL RESPONSES TO PHQ QUESTIONS 1-9: 0
SUM OF ALL RESPONSES TO PHQ QUESTIONS 1-9: 0
2. FEELING DOWN, DEPRESSED OR HOPELESS: NOT AT ALL
1. LITTLE INTEREST OR PLEASURE IN DOING THINGS: NOT AT ALL
SUM OF ALL RESPONSES TO PHQ QUESTIONS 1-9: 0
SUM OF ALL RESPONSES TO PHQ QUESTIONS 1-9: 0
SUM OF ALL RESPONSES TO PHQ9 QUESTIONS 1 & 2: 0

## 2024-10-03 NOTE — PROGRESS NOTES
Edelmira was seen today for follow-up and other.    Diagnoses and all orders for this visit:    Impaired fasting glucose  -     Cancel: AMB POC HEMOGLOBIN A1C  -     Hemoglobin A1C; Future  -     Hemoglobin A1C    Elevated lipids  -     Lipid Panel  -     Lipid Panel    Laboratory examination ordered as part of a routine general medical examination  -     TSH; Future  -     TSH    Neutropenic fever (HCC)  -     Discontinue: HYDROcodone-acetaminophen (NORCO) 5-325 MG per tablet; Take 1 tablet by mouth every 6 hours as needed for Pain for up to 5 days. Intended supply: 3 days. Take lowest dose possible to manage pain Max Daily Amount: 4 tablets  -     HYDROcodone-acetaminophen (NORCO) 5-325 MG per tablet; Take 1 tablet by mouth every 6 hours as needed for Pain for up to 5 days. Intended supply: 3 days. Take lowest dose possible to manage pain Max Daily Amount: 4 tablets    Other orders  -     cefdinir (OMNICEF) 300 MG capsule; Take 1 capsule by mouth 2 times daily for 10 days      Will refill omnicef, norco until get more data ANC    Edelmira Jaquez is a 78 y.o. female    Chief Complaint   Patient presents with    Follow-up     Yearly check up     Other     ER follow up wants Rx for pain until she see pain mgt 10/23/24         Admission on 09/30/2024, Discharged on 09/30/2024   Component Date Value Ref Range Status    WBC 09/30/2024 2.5 (L)  4.3 - 11.1 K/uL Final    PERIPHERAL REVIEW TO FOLLOW    RBC 09/30/2024 4.65  4.05 - 5.2 M/uL Final    Hemoglobin 09/30/2024 13.2  11.7 - 15.4 g/dL Final    Hematocrit 09/30/2024 41.5  35.8 - 46.3 % Final    MCV 09/30/2024 89.2  82.0 - 102.0 FL Final    MCH 09/30/2024 28.4  26.1 - 32.9 PG Final    MCHC 09/30/2024 31.8  31.4 - 35.0 g/dL Final    RDW 09/30/2024 14.0  11.9 - 14.6 % Final    Platelets 09/30/2024 177  150 - 450 K/uL Final    MPV 09/30/2024 9.4  9.4 - 12.3 FL Final    nRBC 09/30/2024 0.00  0.0 - 0.2 K/uL Final    **Note: Absolute NRBC parameter is now reported with Hemogram**

## 2024-10-03 NOTE — PATIENT INSTRUCTIONS
medical transportation ([employer]) that supports all levels of transport: rideshare (Lyft/Uber), Taxis, wheelchair vans when and where they are needed.  Contact: [email or web address]

## 2024-10-03 NOTE — PATIENT INSTRUCTIONS
Patient Information from Today's Visit    The members of your [facility] are listed below:    Physician Provider: Job Hinds Medical Oncologist  Advanced Practice Clinician: Nat Eubanks NP  Registered Nurse: Susana CASANOVA   Navigator: N/A  Medical Assistant: Chloe NORMAN MA  : Gay SOTELO   Supportive Care Services: Ginny DOMINGUEZ LMSW    Diagnosis: neutropenia      Follow Up Instructions:   Proceed testing      Treatment Summary has been discussed and given to patient:N/A      Current Labs:   No visits with results within 3 Day(s) from this visit.   Latest known visit with results is:   Admission on 09/30/2024, Discharged on 09/30/2024   Component Date Value Ref Range Status    WBC 09/30/2024 2.5 (L)  4.3 - 11.1 K/uL Final    PERIPHERAL REVIEW TO FOLLOW    RBC 09/30/2024 4.65  4.05 - 5.2 M/uL Final    Hemoglobin 09/30/2024 13.2  11.7 - 15.4 g/dL Final    Hematocrit 09/30/2024 41.5  35.8 - 46.3 % Final    MCV 09/30/2024 89.2  82.0 - 102.0 FL Final    MCH 09/30/2024 28.4  26.1 - 32.9 PG Final    MCHC 09/30/2024 31.8  31.4 - 35.0 g/dL Final    RDW 09/30/2024 14.0  11.9 - 14.6 % Final    Platelets 09/30/2024 177  150 - 450 K/uL Final    MPV 09/30/2024 9.4  9.4 - 12.3 FL Final    nRBC 09/30/2024 0.00  0.0 - 0.2 K/uL Final    **Note: Absolute NRBC parameter is now reported with Hemogram**    Neutrophils % 09/30/2024 2 (L)  43 - 78 % Final    Lymphocytes % 09/30/2024 79 (H)  13 - 44 % Final    Monocytes % 09/30/2024 17 (H)  4.0 - 12.0 % Final    Eosinophils % 09/30/2024 1  0.5 - 7.8 % Final    Basophils % 09/30/2024 1  0.0 - 2.0 % Final    Immature Granulocytes % 09/30/2024 0  0.0 - 5.0 % Final    Neutrophils Absolute 09/30/2024 0.1 (L)  1.7 - 8.2 K/UL Final    Lymphocytes Absolute 09/30/2024 2.0  0.5 - 4.6 K/UL Final    Monocytes Absolute 09/30/2024 0.4  0.1 - 1.3 K/UL Final    Eosinophils Absolute 09/30/2024 0.0  0.0 - 0.8 K/UL Final    Basophils Absolute 09/30/2024 0.0  0.0 - 0.2 K/UL Final

## 2024-10-04 ENCOUNTER — TELEPHONE (OUTPATIENT)
Dept: ONCOLOGY | Age: 79
End: 2024-10-04

## 2024-10-04 LAB
CHOLEST SERPL-MCNC: 288 MG/DL (ref 0–200)
EST. AVERAGE GLUCOSE BLD GHB EST-MCNC: 147 MG/DL
HBA1C MFR BLD: 6.8 % (ref 0–5.6)
HDLC SERPL-MCNC: 62 MG/DL (ref 40–60)
HDLC SERPL: 4.7 (ref 0–5)
LDLC SERPL CALC-MCNC: 174 MG/DL (ref 0–100)
TRIGL SERPL-MCNC: 261 MG/DL (ref 0–150)
TSH, 3RD GENERATION: 1.48 UIU/ML (ref 0.27–4.2)
VLDLC SERPL CALC-MCNC: 52 MG/DL (ref 6–23)

## 2024-10-04 NOTE — TELEPHONE ENCOUNTER
Returned call to daughter.  Informed referral placed and IR will call to schedule.  Verbalized understanding.

## 2024-10-04 NOTE — TELEPHONE ENCOUNTER
Patient's daughter would like to get bone biopsy completed, however scheduling did not have an order to schedule

## 2024-10-05 LAB
BACTERIA SPEC CULT: NORMAL
SERVICE CMNT-IMP: NORMAL

## 2024-10-08 DIAGNOSIS — D70.9 NEUTROPENIC FEVER (HCC): ICD-10-CM

## 2024-10-08 DIAGNOSIS — R50.81 NEUTROPENIC FEVER (HCC): ICD-10-CM

## 2024-10-08 RX ORDER — HYDROCODONE BITARTRATE AND ACETAMINOPHEN 5; 325 MG/1; MG/1
1 TABLET ORAL EVERY 6 HOURS PRN
Qty: 20 TABLET | Refills: 0 | OUTPATIENT
Start: 2024-10-08 | End: 2024-10-13

## 2024-10-09 DIAGNOSIS — D70.9 NEUTROPENIA, UNSPECIFIED TYPE (HCC): Primary | ICD-10-CM

## 2024-10-09 ASSESSMENT — ENCOUNTER SYMPTOMS
BACK PAIN: 1
ABDOMINAL PAIN: 0
SHORTNESS OF BREATH: 0

## 2024-10-09 NOTE — TELEPHONE ENCOUNTER
PAIN MANAGEMENT APPT ON 10-23-24    NEEDS ENOUGH TO GET HER TO THE APPT SO SHE NEEDS 14 DAYS TO GET HER TO PAIN MANAGEMENT APPT     Medication  HYDROcodone-acetaminophen (NORCO) 5-325 MG per tablet [53657]  HYDROcodone-acetaminophen (NORCO) 5-325 MG per tablet       SEND TO   [facility]/pharmacy #[id number] - [place], [place] - [address] - P [phone] - F [phone]

## 2024-10-11 RX ORDER — HYDROCODONE BITARTRATE AND ACETAMINOPHEN 5; 325 MG/1; MG/1
1 TABLET ORAL EVERY 6 HOURS PRN
Qty: 20 TABLET | Refills: 0 | Status: SHIPPED | OUTPATIENT
Start: 2024-10-11 | End: 2024-10-16

## 2024-10-16 ENCOUNTER — PATIENT MESSAGE (OUTPATIENT)
Dept: INTERNAL MEDICINE CLINIC | Facility: CLINIC | Age: 79
End: 2024-10-16

## 2024-10-16 DIAGNOSIS — R50.81 NEUTROPENIC FEVER (HCC): ICD-10-CM

## 2024-10-16 DIAGNOSIS — D70.9 NEUTROPENIC FEVER (HCC): ICD-10-CM

## 2024-10-16 RX ORDER — HYDROCODONE BITARTRATE AND ACETAMINOPHEN 5; 325 MG/1; MG/1
1 TABLET ORAL EVERY 6 HOURS PRN
Qty: 20 TABLET | Refills: 0 | Status: SHIPPED | OUTPATIENT
Start: 2024-10-16 | End: 2024-10-21

## 2024-10-18 ENCOUNTER — HOSPITAL ENCOUNTER (OUTPATIENT)
Dept: CT IMAGING | Age: 79
Discharge: HOME | End: 2024-10-21
Attending: INTERNAL MEDICINE
Payer: MEDICAID

## 2024-10-18 VITALS
TEMPERATURE: 97.4 F | HEIGHT: 63 IN | WEIGHT: 177 LBS | OXYGEN SATURATION: 94 % | RESPIRATION RATE: 16 BRPM | BODY MASS INDEX: 31.36 KG/M2 | SYSTOLIC BLOOD PRESSURE: 140 MMHG | HEART RATE: 86 BPM | DIASTOLIC BLOOD PRESSURE: 84 MMHG

## 2024-10-18 DIAGNOSIS — D70.9 NEUTROPENIA, UNSPECIFIED TYPE (HCC): ICD-10-CM

## 2024-10-18 LAB
BASOPHILS # BLD: 0 K/UL (ref 0–0.2)
BASOPHILS NFR BLD: 1 % (ref 0–2)
BONE MARROW PREP & WRIGHT STAIN: NORMAL
DIFFERENTIAL METHOD BLD: ABNORMAL
EOSINOPHIL # BLD: 0 K/UL (ref 0–0.8)
EOSINOPHIL NFR BLD: 1 % (ref 0.5–7.8)
ERYTHROCYTE [DISTWIDTH] IN BLOOD BY AUTOMATED COUNT: 14.1 % (ref 11.9–14.6)
HCT VFR BLD AUTO: 38.9 % (ref 35.8–46.3)
HGB BLD-MCNC: 12.3 G/DL (ref 11.7–15.4)
IMM GRANULOCYTES # BLD AUTO: 0 K/UL (ref 0–0.5)
IMM GRANULOCYTES NFR BLD AUTO: 0 % (ref 0–5)
LYMPHOCYTES # BLD: 1.5 K/UL (ref 0.5–4.6)
LYMPHOCYTES NFR BLD: 72 % (ref 13–44)
MCH RBC QN AUTO: 28 PG (ref 26.1–32.9)
MCHC RBC AUTO-ENTMCNC: 31.6 G/DL (ref 31.4–35)
MCV RBC AUTO: 88.6 FL (ref 82–102)
MONOCYTES # BLD: 0.4 K/UL (ref 0.1–1.3)
MONOCYTES NFR BLD: 21 % (ref 4–12)
NEUTS SEG # BLD: 0.1 K/UL (ref 1.7–8.2)
NEUTS SEG NFR BLD: 5 % (ref 43–78)
NRBC # BLD: 0 K/UL (ref 0–0.2)
PLATELET # BLD AUTO: 193 K/UL (ref 150–450)
PMV BLD AUTO: 9.5 FL (ref 9.4–12.3)
RBC # BLD AUTO: 4.39 M/UL (ref 4.05–5.2)
WBC # BLD AUTO: 2.1 K/UL (ref 4.3–11.1)

## 2024-10-18 PROCEDURE — 2500000003 HC RX 250 WO HCPCS: Performed by: PHYSICIAN ASSISTANT

## 2024-10-18 PROCEDURE — 99152 MOD SED SAME PHYS/QHP 5/>YRS: CPT | Performed by: RADIOLOGY

## 2024-10-18 PROCEDURE — 38222 DX BONE MARROW BX & ASPIR: CPT | Performed by: PHYSICIAN ASSISTANT

## 2024-10-18 PROCEDURE — 85025 COMPLETE CBC W/AUTO DIFF WBC: CPT

## 2024-10-18 PROCEDURE — 88305 TISSUE EXAM BY PATHOLOGIST: CPT

## 2024-10-18 PROCEDURE — 77012 CT SCAN FOR NEEDLE BIOPSY: CPT | Performed by: PHYSICIAN ASSISTANT

## 2024-10-18 PROCEDURE — 6360000002 HC RX W HCPCS: Performed by: RADIOLOGY

## 2024-10-18 PROCEDURE — C1830 POWER BONE MARROW BX NEEDLE: HCPCS

## 2024-10-18 PROCEDURE — 99152 MOD SED SAME PHYS/QHP 5/>YRS: CPT

## 2024-10-18 PROCEDURE — 88313 SPECIAL STAINS GROUP 2: CPT

## 2024-10-18 PROCEDURE — 88311 DECALCIFY TISSUE: CPT

## 2024-10-18 RX ORDER — LIDOCAINE HYDROCHLORIDE 20 MG/ML
INJECTION, SOLUTION INFILTRATION; PERINEURAL PRN
Status: COMPLETED | OUTPATIENT
Start: 2024-10-18 | End: 2024-10-18

## 2024-10-18 RX ORDER — MIDAZOLAM HYDROCHLORIDE 2 MG/2ML
INJECTION, SOLUTION INTRAMUSCULAR; INTRAVENOUS PRN
Status: COMPLETED | OUTPATIENT
Start: 2024-10-18 | End: 2024-10-18

## 2024-10-18 RX ORDER — FENTANYL CITRATE 50 UG/ML
INJECTION, SOLUTION INTRAMUSCULAR; INTRAVENOUS PRN
Status: COMPLETED | OUTPATIENT
Start: 2024-10-18 | End: 2024-10-18

## 2024-10-18 RX ADMIN — MIDAZOLAM HYDROCHLORIDE 0.5 MG: 1 INJECTION, SOLUTION INTRAMUSCULAR; INTRAVENOUS at 13:18

## 2024-10-18 RX ADMIN — MIDAZOLAM HYDROCHLORIDE 1 MG: 1 INJECTION, SOLUTION INTRAMUSCULAR; INTRAVENOUS at 13:04

## 2024-10-18 RX ADMIN — FENTANYL CITRATE 50 MCG: 50 INJECTION, SOLUTION INTRAMUSCULAR; INTRAVENOUS at 13:04

## 2024-10-18 RX ADMIN — MIDAZOLAM HYDROCHLORIDE 0.5 MG: 1 INJECTION, SOLUTION INTRAMUSCULAR; INTRAVENOUS at 13:12

## 2024-10-18 RX ADMIN — FENTANYL CITRATE 25 MCG: 50 INJECTION, SOLUTION INTRAMUSCULAR; INTRAVENOUS at 13:12

## 2024-10-18 RX ADMIN — FENTANYL CITRATE 25 MCG: 50 INJECTION, SOLUTION INTRAMUSCULAR; INTRAVENOUS at 13:18

## 2024-10-18 RX ADMIN — LIDOCAINE HYDROCHLORIDE 9 ML: 20 INJECTION, SOLUTION INFILTRATION; PERINEURAL at 13:17

## 2024-10-18 ASSESSMENT — PAIN - FUNCTIONAL ASSESSMENT: PAIN_FUNCTIONAL_ASSESSMENT: 0-10

## 2024-10-18 NOTE — PRE SEDATION
Sedation Pre-Procedure Note    Patient Name: Edelmira Jaquez   YOB: 1945  Room/Bed: Room/bed info not found  Medical Record Number: [id number]  Date: 10/18/2024   Time: 12:48 PM       Indication: Neutropenia/CT-guided bone marrow biopsy and aspiration    Consent: I have discussed with the patient and/or the patient representative the indication, alternatives, and the possible risks and/or complications of the planned procedure and the anesthesia methods. The patient and/or patient representative appear to understand and agree to proceed.    Vital Signs:   Vitals:    10/18/24 1114   BP: (!) 159/72   Pulse: 87   Resp: 18   Temp: 97.4 °F (36.3 °C)   SpO2: 95%       Past Medical History:   has a past medical history of Arthritis, Cancer ([facility]), Ear problems, Hearing loss, Hearing reduced, Hypercholesterolemia, MS (multiple sclerosis) ([facility]), Osteoporosis, and Rheumatoid arthritis ([facility]).    Past Surgical History:   has a past surgical history that includes Appendectomy; Cataract removal (Bilateral); Hysterectomy; and Hysterectomy, total abdominal.    Medications:   Scheduled Meds:   Continuous Infusions:   PRN Meds:   Home Meds:   Prior to Admission medications    Medication Sig Start Date End Date Taking? Authorizing Provider   HYDROcodone-acetaminophen (NORCO) 5-325 MG per tablet Take 1 tablet by mouth every 6 hours as needed for Pain for up to 5 days. Intended supply: 3 days. Take lowest dose possible to manage pain Max Daily Amount: 4 tablets 10/16/24 10/21/24  Jp Tinsley, DO   denosumab (PROLIA) 60 MG/ML SOSY SC injection Inject 1 mL into the skin She had Prolia #2 on 4/14/22.  Patient not taking: Reported on 9/25/2024    Automatic Reconciliation, Ar       Pre-Sedation Documentation and Exam:   I have personally completed a history, physical exam & review of systems for this patient (see notes).  Vital signs have been reviewed (see flow sheet for vitals).    Mallampati Airway

## 2024-10-18 NOTE — OR NURSING
.TRANSFER - OUT REPORT:           Verbal report given to NICHOLE Do on Edelmira Jaquez  being transferred to IR Recovery for routine post-op      Report consisted of patient’s Situation, Background, Assessment and Recommendations(SBAR).          Information from the following report(s) SBAR, Procedure Summary, and MAR was reviewed with the receiving nurse.       Opportunity for questions and clarification was provided.          Conscious Sedation:    100 Mcg of Fentanyl administered   2 Mg of Versed administered   0 Mg of Benadryl administered        Pt tolerated procedure well.           bandaid-type dressing clean, dry, intact, and nontender    VITALS:  BP (!) 149/70   Pulse 85   Temp 97.4 °F (36.3 °C) (Infrared)   Resp 14   Ht 1.6 m (5' 3\")   Wt 80.3 kg (177 lb)   SpO2 96%   BMI 31.35 kg/m²

## 2024-10-18 NOTE — PROCEDURES
PROCEDURE NOTE  Date: 10/18/2024   Name: Edelmira Jaquez  YOB: 1945    Procedures        Reading Physician Reading Date Result Priority   Abraham Oliveira MD  [phone] 10/18/2024    Linda Whitt PA  [phone] 10/18/2024      Narrative & Impression  Title: CT guided bone marrow aspiration and core bone biopsy.     History: 78year old woman with neutropenia.        :  Amy Whitt PA-C     Supervising Physician: Dr. Abraham Oliveira MD. Supervising physician attests  that he was immediately available to supervise entire procedure. He reviewed the  permanently stored images and agrees with the report as written     Radiation dose reduction techniques were used for this study. [facility]  CT scanner use one or all of the following: Automated exposure control,  adjustment of the mA and/or kV according to patient size, iterative  reconstruction.     Consent: Informed written and oral consent was obtained from the patient after  explanation of benefits and risks (including, but not limited to:  Infection,  Hemorrhage, Visceral Injury).  The patient's questions were answered to their  satisfaction.  The patient stated understanding and requested that we proceed.       Procedure: Maximal sterile barrier technique was used.  With the patient prone a  preliminary CT scan through the pelvis was performed with radio-opaque markers  on the skin.     Following routine prep and drape of the pelvis, a local field block with  lidocaine was achieved.      Using intermittent CT technique, a marrow aspirate followed by a core biopsy was  obtained with the 11 gauge On Control biopsy device.      The needle was removed. Hemostasis was achieved with manual compression. A  bandage was applied.     Total Exam .43 mGy-cm     Complications: None.     Medications: Moderate sedation was given under the direction and supervision of  Dr. Abraham Oliveira MD. Total face-to-face

## 2024-10-18 NOTE — H&P
[facility]  Department of Interventional Radiology  [phone]    History and Physical    Patient:  Edelmira Jaquez MRN:  [id number]  SSN:  [id number]    YOB: 1945  Age:  78 y.o.  Sex:  female      Primary Care Provider:  Jp Tinsley DO  Referring Physician:  Job Hinds MD    Subjective:     Chief Complaint: Neutropenia    History of the Present Illness:  The patient is a 78 y.o. female who presents for CT-guided bone marrow aspiration and core bone biopsy under moderate sedation.      Patient has a history of MS diagnosed in 1977 with no treatment needed, seropositive rheumatoid arthritis, ovarian cancer status post hysterectomy 1997, and neutropenia.    Patient is n.p.o. today denies taking any blood thinners      Past Medical History:   Diagnosis Date    Arthritis     left knee    Cancer (HCC)     ovarian    Ear problems     Hearing loss     Hearing reduced     Hypercholesterolemia     MS (multiple sclerosis) ([facility])     1977    Osteoporosis     Rheumatoid arthritis ([facility])      Past Surgical History:   Procedure Laterality Date    APPENDECTOMY      CATARACT REMOVAL Bilateral     HYSTERECTOMY (CERVIX STATUS UNKNOWN)      HYSTERECTOMY, TOTAL ABDOMINAL (CERVIX REMOVED)          Review of Systems:    Pertinent items are noted in HPI.    Prior to Admission medications    Medication Sig Start Date End Date Taking? Authorizing Provider   HYDROcodone-acetaminophen (NORCO) 5-325 MG per tablet Take 1 tablet by mouth every 6 hours as needed for Pain for up to 5 days. Intended supply: 3 days. Take lowest dose possible to manage pain Max Daily Amount: 4 tablets 10/16/24 10/21/24  Jp Tnisley DO   denosumab (PROLIA) 60 MG/ML SOSY SC injection Inject 1 mL into the skin She had Prolia #2 on 4/14/22.  Patient not taking: Reported on 9/25/2024    Automatic Reconciliation, Ar        Allergies   Allergen Reactions    Statins Myalgia     Other reaction(s): Myalgia    12.3 10/18/2024 11:20 AM    HGB 13.2 09/30/2024 04:43 PM    HCT 38.9 10/18/2024 11:20 AM    HCT 41.5 09/30/2024 04:43 PM     10/18/2024 11:20 AM     09/30/2024 04:43 PM         Assessment:     78-year-old female with neutropenia        Plan:     Planned Procedure: CT-guided bone marrow aspiration and core bone biopsy under moderate sedation    Risks, benefits, and alternatives reviewed with patient and she agrees to proceed with the procedure.      Signed By: FELIPE Arriaga     October 18, 2024

## 2024-10-18 NOTE — DISCHARGE INSTRUCTIONS
If you have any questions about your procedure, please call the Interventional Radiology department at [phone].   After business hours (5pm) and weekends, call the answering service at [phone] and ask for the Radiologist on call to be paged.     Si tiene Preguntas acerca del procedimiento, por favor llame al departamento de Radiología Intervencional al [phone].   Después de horas de oficina (5 pm) y los fines de semana, llamar al servicio de llamadas al [phone] y pregunte por el Radiologo de delgado.      Interventional Radiology General Nurse Discharge    After general anesthesia or intravenous sedation, for 24 hours or while taking prescription Narcotics:  Limit your activities  Do not drive and operate hazardous machinery  Do not make important personal or business decisions  Do  not drink alcoholic beverages  If you have not urinated within 8 hours after discharge, please contact your surgeon on call.    * Please give a list of your current medications to your Primary Care Provider.  * Please update this list whenever your medications are discontinued, doses are     changed, or new medications (including over-the-counter products) are added.  * Please carry medication information at all times in case of emergency situations.    These are general instructions for a healthy lifestyle:    No smoking/ No tobacco products/ Avoid exposure to second hand smoke  Surgeon General's Warning:  Quitting smoking now greatly reduces serious risk to your health.    Obesity, smoking, and sedentary lifestyle greatly increases your risk for illness  A healthy diet, regular physical exercise & weight monitoring are important for maintaining a healthy lifestyle    You may be retaining fluid if you have a history of heart failure or if you experience any of the following symptoms:  Weight gain of 3 pounds or more overnight or 5 pounds in a week, increased swelling in our hands or feet or shortness of breath

## 2024-10-18 NOTE — FLOWSHEET NOTE
Recovery period without difficulty. Pt alert and oriented and denies pain. Dressing is clean, dry, and intact. Reviewed discharge instructions with patient and family, both verbalized understanding. Pt escorted to lobby discharge area via wheelchair. Vital signs and Tessy score completed.

## 2024-10-21 DIAGNOSIS — D70.9 NEUTROPENIC FEVER (HCC): ICD-10-CM

## 2024-10-21 DIAGNOSIS — R50.81 NEUTROPENIC FEVER (HCC): ICD-10-CM

## 2024-10-21 RX ORDER — HYDROCODONE BITARTRATE AND ACETAMINOPHEN 5; 325 MG/1; MG/1
1 TABLET ORAL EVERY 6 HOURS PRN
Qty: 20 TABLET | Refills: 0 | OUTPATIENT
Start: 2024-10-21 | End: 2024-10-26

## 2024-10-23 ENCOUNTER — OFFICE VISIT (OUTPATIENT)
Age: 79
End: 2024-10-23
Payer: MEDICARE

## 2024-10-23 DIAGNOSIS — M05.79 RHEUMATOID ARTHRITIS INVOLVING MULTIPLE SITES WITH POSITIVE RHEUMATOID FACTOR (HCC): Primary | Chronic | ICD-10-CM

## 2024-10-23 DIAGNOSIS — Z79.891 ENCOUNTER FOR LONG-TERM OPIATE ANALGESIC USE: ICD-10-CM

## 2024-10-23 DIAGNOSIS — M47.812 CERVICAL SPONDYLOSIS: ICD-10-CM

## 2024-10-23 DIAGNOSIS — M05.79 RHEUMATOID ARTHRITIS INVOLVING MULTIPLE SITES WITH POSITIVE RHEUMATOID FACTOR (HCC): Chronic | ICD-10-CM

## 2024-10-23 PROBLEM — E55.9 VITAMIN D DEFICIENCY: Status: ACTIVE | Noted: 2021-10-24

## 2024-10-23 PROBLEM — M06.9 RHEUMATOID ARTHRITIS (HCC): Chronic | Status: ACTIVE | Noted: 2018-03-01

## 2024-10-23 PROBLEM — E78.2 MIXED HYPERLIPIDEMIA: Status: ACTIVE | Noted: 2021-10-24

## 2024-10-23 PROCEDURE — 1159F MED LIST DOCD IN RCRD: CPT | Performed by: PHYSICIAN ASSISTANT

## 2024-10-23 PROCEDURE — 99204 OFFICE O/P NEW MOD 45 MIN: CPT | Performed by: PHYSICIAN ASSISTANT

## 2024-10-23 PROCEDURE — 1123F ACP DISCUSS/DSCN MKR DOCD: CPT | Performed by: PHYSICIAN ASSISTANT

## 2024-10-23 RX ORDER — HYDROCODONE BITARTRATE AND ACETAMINOPHEN 7.5; 325 MG/1; MG/1
1 TABLET ORAL EVERY 6 HOURS PRN
Qty: 120 TABLET | Refills: 0 | Status: SHIPPED | OUTPATIENT
Start: 2024-10-23 | End: 2024-11-22

## 2024-10-23 ASSESSMENT — ENCOUNTER SYMPTOMS
EYES NEGATIVE: 1
SHORTNESS OF BREATH: 0
ABDOMINAL PAIN: 0
ALLERGIC/IMMUNOLOGIC NEGATIVE: 1

## 2024-10-23 NOTE — PROGRESS NOTES
.  
Ms Jaquez is a new patient referred to us by her rheumatologist for chronic pain related to polylarthralgias from rheumatoid arthritis. She has recently been in the hospital for neutropenic fever and underwent bone marrow biopsy with heme/onc. Her PCP has been writing her limited supplies of Norco 5mg for her chronic pain complaints. Pain is mostly improved with prednisone at high doses (30mg or more daily). She was needing Norco 5mg every 6 hrs. She was getting it 4/day which was helping for 3-4 hrs. Her daughter accompanies her today with additional history. There are days where it is more tolerable. Her pain is her hands, shoulders and neck. She has knee pain as well. She says she can live with the knee pain. She does have numbness/tingling in her hands but there is also stiffness in her wrists particularly with first AM awakening.   
7  -At its worst:  14    How does the pain make you feel? Frustrated and Helpless and/ or Hopeless    What does the pain interfere with?  Sleep and Daily Activities    What makes pain better?  Medications and Rest    What makes pain worst?  Daily Activities, Walking, and any movement    What other symptoms do you have? Bladder incontinence  and Stiffness  Loss of bowel/bladder control? yes         Objective:     Physical Exam     Ortho Exam     There were no vitals filed for this visit.  Wt Readings from Last 3 Encounters:   10/18/24 80.3 kg (177 lb)   10/03/24 80.3 kg (177 lb)   10/03/24 80.3 kg (177 lb)              Previous interventions:  Procedure Date Results   Steroid injections Several throughout 2024  no change                                                      Previous medication trials: Listed:       Class Medication Results   NSAIDs Ibuprofen      Oral steroids  prednisone Moderate benefit   Tylenol       Antiepileptics       Antidepressants       Relaxants       Topicals/transdermaI patches Voltaren gel, lidocaine patch  no benefit, limited benefit   Narcotics Norco   mild benefit          Previous tests/studies:  Study Date Results   Bilateral hand xray 12/3/20  FINDINGS: Bilateral AP, Lateral, Oblique views are submitted. On both the left  and the right there is generalized osteopenia.  Bilaterally, there is no bone fracture or suspicious lesion.  In both the wrists and hands there is not marked joint space narrowing and I do  not identify significant periarticular erosions.  Bilaterally at the base of the thumb carpal metacarpal joints there is mild  increased DJD changes-symmetric appearing.  No focal findings in the soft tissues. There may be some soft tissue swelling  bilaterally at the index fingers and right fourth finger-this should be  correlated for clinically.                                                      Previous therapies:  Type of Therapy Date Results   Physical therapy (Jade)

## 2024-10-25 LAB — DRUGS UR: NORMAL

## 2024-10-30 LAB
FLOW CYTOMETRY RESULTS: NORMAL
SPECIMEN SOURCE: NORMAL
TEST ORDERED: NORMAL

## 2024-10-31 ENCOUNTER — HOSPITAL ENCOUNTER (OUTPATIENT)
Dept: LAB | Age: 79
Discharge: HOME | End: 2024-10-31
Payer: MEDICARE

## 2024-10-31 ENCOUNTER — CLINICAL DOCUMENTATION (OUTPATIENT)
Dept: ONCOLOGY | Age: 79
End: 2024-10-31

## 2024-10-31 ENCOUNTER — OFFICE VISIT (OUTPATIENT)
Dept: ONCOLOGY | Age: 79
End: 2024-10-31

## 2024-10-31 VITALS
TEMPERATURE: 97.7 F | OXYGEN SATURATION: 96 % | RESPIRATION RATE: 18 BRPM | WEIGHT: 173.8 LBS | HEART RATE: 71 BPM | SYSTOLIC BLOOD PRESSURE: 148 MMHG | DIASTOLIC BLOOD PRESSURE: 72 MMHG | BODY MASS INDEX: 30.79 KG/M2 | HEIGHT: 63 IN

## 2024-10-31 DIAGNOSIS — H91.90 HOH (HARD OF HEARING): ICD-10-CM

## 2024-10-31 DIAGNOSIS — Z79.899 HIGH RISK MEDICATION USE: ICD-10-CM

## 2024-10-31 DIAGNOSIS — Z51.11 ENCOUNTER FOR ANTINEOPLASTIC CHEMOTHERAPY: ICD-10-CM

## 2024-10-31 DIAGNOSIS — D70.9 SEVERE NEUTROPENIA (HCC): ICD-10-CM

## 2024-10-31 DIAGNOSIS — G35 MS (MULTIPLE SCLEROSIS) (HCC): ICD-10-CM

## 2024-10-31 DIAGNOSIS — C91.Z0 T-CELL LARGE GRANULAR LYMPHOCYTIC LEUKEMIA (HCC): Primary | ICD-10-CM

## 2024-10-31 DIAGNOSIS — H54.8 LEGALLY BLIND: ICD-10-CM

## 2024-10-31 PROBLEM — C95.90 LEUKEMIA NOT HAVING ACHIEVED REMISSION (HCC): Status: ACTIVE | Noted: 2024-10-31

## 2024-10-31 LAB
ALBUMIN SERPL-MCNC: 3.3 G/DL (ref 3.2–4.6)
ALBUMIN/GLOB SERPL: 0.7 (ref 1–1.9)
ALP SERPL-CCNC: 73 U/L (ref 35–104)
ALT SERPL-CCNC: 8 U/L (ref 8–45)
ANION GAP SERPL CALC-SCNC: 10 MMOL/L (ref 7–16)
AST SERPL-CCNC: 19 U/L (ref 15–37)
BASOPHILS # BLD: 0 K/UL (ref 0–0.2)
BASOPHILS NFR BLD: 2 % (ref 0–2)
BILIRUB SERPL-MCNC: 0.3 MG/DL (ref 0–1.2)
BUN SERPL-MCNC: 14 MG/DL (ref 8–23)
CALCIUM SERPL-MCNC: 9.2 MG/DL (ref 8.8–10.2)
CHLORIDE SERPL-SCNC: 103 MMOL/L (ref 98–107)
CO2 SERPL-SCNC: 25 MMOL/L (ref 20–29)
CREAT SERPL-MCNC: 0.69 MG/DL (ref 0.6–1.1)
DIFFERENTIAL METHOD BLD: ABNORMAL
EOSINOPHIL # BLD: 0 K/UL (ref 0–0.8)
EOSINOPHIL NFR BLD: 2 % (ref 0.5–7.8)
ERYTHROCYTE [DISTWIDTH] IN BLOOD BY AUTOMATED COUNT: 13.9 % (ref 11.9–14.6)
GLOBULIN SER CALC-MCNC: 4.5 G/DL (ref 2.3–3.5)
GLUCOSE SERPL-MCNC: 102 MG/DL (ref 70–99)
HCT VFR BLD AUTO: 38.4 % (ref 35.8–46.3)
HGB BLD-MCNC: 12 G/DL (ref 11.7–15.4)
IMM GRANULOCYTES # BLD AUTO: 0 K/UL (ref 0–0.5)
IMM GRANULOCYTES NFR BLD AUTO: 0 % (ref 0–5)
LYMPHOCYTES # BLD: 1.4 K/UL (ref 0.5–4.6)
LYMPHOCYTES NFR BLD: 75 % (ref 13–44)
MCH RBC QN AUTO: 27.8 PG (ref 26.1–32.9)
MCHC RBC AUTO-ENTMCNC: 31.3 G/DL (ref 31.4–35)
MCV RBC AUTO: 89.1 FL (ref 82–102)
MONOCYTES # BLD: 0.4 K/UL (ref 0.1–1.3)
MONOCYTES NFR BLD: 19 % (ref 4–12)
NEUTS SEG # BLD: 0 K/UL (ref 1.7–8.2)
NEUTS SEG NFR BLD: 2 % (ref 43–78)
NRBC # BLD: 0 K/UL (ref 0–0.2)
PLATELET # BLD AUTO: 194 K/UL (ref 150–450)
PMV BLD AUTO: 9.4 FL (ref 9.4–12.3)
POTASSIUM SERPL-SCNC: 4 MMOL/L (ref 3.5–5.1)
PROT SERPL-MCNC: 7.8 G/DL (ref 6.3–8.2)
RBC # BLD AUTO: 4.31 M/UL (ref 4.05–5.2)
SODIUM SERPL-SCNC: 138 MMOL/L (ref 136–145)
WBC # BLD AUTO: 1.9 K/UL (ref 4.3–11.1)

## 2024-10-31 PROCEDURE — 85025 COMPLETE CBC W/AUTO DIFF WBC: CPT

## 2024-10-31 PROCEDURE — 36415 COLL VENOUS BLD VENIPUNCTURE: CPT

## 2024-10-31 PROCEDURE — 80053 COMPREHEN METABOLIC PANEL: CPT

## 2024-10-31 RX ORDER — PREDNISONE 20 MG/1
60 TABLET ORAL DAILY
Qty: 30 TABLET | Refills: 0 | Status: SHIPPED | OUTPATIENT
Start: 2024-10-31

## 2024-10-31 RX ORDER — PANTOPRAZOLE SODIUM 40 MG/1
40 TABLET, DELAYED RELEASE ORAL
Qty: 90 TABLET | Refills: 1 | Status: SHIPPED | OUTPATIENT
Start: 2024-10-31

## 2024-10-31 ASSESSMENT — PATIENT HEALTH QUESTIONNAIRE - PHQ9
SUM OF ALL RESPONSES TO PHQ QUESTIONS 1-9: 0
1. LITTLE INTEREST OR PLEASURE IN DOING THINGS: NOT AT ALL
SUM OF ALL RESPONSES TO PHQ QUESTIONS 1-9: 0
SUM OF ALL RESPONSES TO PHQ9 QUESTIONS 1 & 2: 0
2. FEELING DOWN, DEPRESSED OR HOPELESS: NOT AT ALL
SUM OF ALL RESPONSES TO PHQ QUESTIONS 1-9: 0
SUM OF ALL RESPONSES TO PHQ QUESTIONS 1-9: 0

## 2024-10-31 NOTE — PROGRESS NOTES
Critical lab of WBC-1.9 received from laboratory, read back and verified. Provider and Clinical support staff notified of critical lab value needing to be addressed at today's visit and receipt confirmed.

## 2024-10-31 NOTE — PATIENT INSTRUCTIONS
Patient Information from Today's Visit    The members of your [facility] are listed below:    Physician Provider: Job Hinds Medical Oncologist  Advanced Practice Clinician: Nat Eubanks NP  Registered Nurse: Susana CASANOVA   Navigator:   Medical Assistant: Chloe NORMAN MA  : Yoana CASTRO   Supportive Care Services: Ginny DOMINGUEZ LMSW    Diagnosis: Neutropenia       Follow Up Instructions:   Reviewed labs  Methotrexate  Vitamin c and d  Tcell large cell leukemia   Treatment Summary has been discussed and given to patient:      Current Labs:   Hospital Outpatient Visit on 10/31/2024   Component Date Value Ref Range Status    WBC 10/31/2024 1.9 (LL)  4.3 - 11.1 K/uL Final    Comment: RESULTS VERIFIED, PHONED TO AND READ BACK BY  CALLED TO ALFONZO GODOY RN 10/31/2024 @1130 BY TLB      RBC 10/31/2024 4.31  4.05 - 5.2 M/uL Final    Hemoglobin 10/31/2024 12.0  11.7 - 15.4 g/dL Final    Hematocrit 10/31/2024 38.4  35.8 - 46.3 % Final    MCV 10/31/2024 89.1  82.0 - 102.0 FL Final    MCH 10/31/2024 27.8  26.1 - 32.9 PG Final    MCHC 10/31/2024 31.3 (L)  31.4 - 35.0 g/dL Final    RDW 10/31/2024 13.9  11.9 - 14.6 % Final    Platelets 10/31/2024 194  150 - 450 K/uL Final    MPV 10/31/2024 9.4  9.4 - 12.3 FL Final    nRBC 10/31/2024 0.00  0.0 - 0.2 K/uL Final    **Note: Absolute NRBC parameter is now reported with Hemogram**    Neutrophils % 10/31/2024 2 (L)  43 - 78 % Final    Lymphocytes % 10/31/2024 75 (H)  13 - 44 % Final    Monocytes % 10/31/2024 19 (H)  4.0 - 12.0 % Final    Eosinophils % 10/31/2024 2  0.5 - 7.8 % Final    Basophils % 10/31/2024 2  0.0 - 2.0 % Final    Immature Granulocytes % 10/31/2024 0  0.0 - 5.0 % Final    Neutrophils Absolute 10/31/2024 0.0 (L)  1.7 - 8.2 K/UL Final    Lymphocytes Absolute 10/31/2024 1.4  0.5 - 4.6 K/UL Final    Monocytes Absolute 10/31/2024 0.4  0.1 - 1.3 K/UL Final    Eosinophils Absolute 10/31/2024 0.0  0.0 - 0.8 K/UL Final    Basophils Absolute 10/31/2024 0.0

## 2024-10-31 NOTE — PROGRESS NOTES
Most nights [facility] Hematology & Oncology: Office Visit Progress Note    Chief Complaint:    Neutropenia  T-cell LGL    History of Present Illness:  Referral Diagnosis: Other neutropenia; Seropositive rheumatoid arthritis     Referring Provider:  Cammy Maloney MD     Primary Care Provider: Jp Tinsley DO     Family History of Cancer/ Hematology Disorders: Mother with colon cancer     Presenting Symptoms:  Fatigue, fever, arthralgias     Ms. Jaquez is a 78 y.o. white female referred for neutropenia. PMH significant for MS (diagnosed in 1977- no treatment), seropositive rheumatoid arthritis, osteoporosis, hearing loss, and ovarian cancer s/p hysterectomy ~ 1997.      9/25/24: Rheumatology f/u for seropositive rheumatoid arthritis (diagnosed in 2020) and osteoporosis. Pt has not used DMARD therapy before. RA is being treated with prednisone (currently completing steroid pack which started at 60 Mg and found that any dose less than 30 Mg does not control symptoms). Prolia was previously used x 2 doses ending in 2022 for osteoporosis. Pt is counseled to consider restarting Prolia due to severity of her osteoporosis.   -Plan for methotrexate 10-12.5 Mg weekly plus daily folic acid based on labs.  -IM Depo-Medrol 40 Mg given once now. Consider PO pred if indicated for symptos  -Laboratory results showed WBC 1.7, neutrophils 14, lymphocytes 66, monocytes 17, ANC 0.2; sed rate 49, CRP WNL; hepatitis panel nonreactive; Mag, phos, vit D, calcium, PTH intact, incubated quantiferon WNL; CO2 19, glucose 103, globulin 4.1, A/G ratio 0.8 (Epic/Labs)      9/26/24: Dr. Maloney called pt's daughter regarding abnormal neutrophil count - , wbc 1.7; she was advised pt to go to er/UTC if fever lasting +1 hour or if infection to have treatment administered.  -Abdominal US ordered to check for splenomegaly (Scheduled for 10/3/24)   -Referred to [facility]  -Review of records indicates that pt's white count has been

## 2024-11-01 ENCOUNTER — TELEPHONE (OUTPATIENT)
Dept: RADIATION ONCOLOGY | Age: 79
End: 2024-11-01

## 2024-11-01 RX ORDER — METHOTREXATE 2.5 MG/1
20 TABLET ORAL WEEKLY
Qty: 32 TABLET | Refills: 2 | Status: SHIPPED | OUTPATIENT
Start: 2024-11-01

## 2024-11-01 NOTE — TELEPHONE ENCOUNTER
Physician provider: Dr. Hinds  Reason for today's call (Please detail here patients chief complaint): there is a problem with the Methotrexate, pharmacy says only comes in 2.5 mg, but  Gave 2 10 mg per month.. they need verification please call pt daughter back   Last office visit:10/31/24  Patient Callback Number: [phone]  Was callback number verified?: Yes  Preferred pharmacy (If refill request): [facility] on [address] [facility]  Calls to office within the last 48 hours?:No    Patient notified that their information will be routed to the [facility] clinical triage team for review. Patient is advised that they will receive a phone call from the triage department. If symptom related and symptoms worsen before receiving a call back, the patient has been advised to proceed to the nearest ED.

## 2024-11-01 NOTE — TELEPHONE ENCOUNTER
Pharmacy only has 2.5 mg methotrexate tablets. Will re-enter same prescription with 2.5 mg tablets. Patient family notified.

## 2024-11-07 ENCOUNTER — HOSPITAL ENCOUNTER (OUTPATIENT)
Dept: MAMMOGRAPHY | Age: 79
Discharge: HOME | End: 2024-11-10
Attending: INTERNAL MEDICINE
Payer: MEDICARE

## 2024-11-07 DIAGNOSIS — M81.0 POSTMENOPAUSAL OSTEOPOROSIS: ICD-10-CM

## 2024-11-07 DIAGNOSIS — M05.9 SEROPOSITIVE RHEUMATOID ARTHRITIS (HCC): ICD-10-CM

## 2024-11-07 PROCEDURE — 77080 DXA BONE DENSITY AXIAL: CPT

## 2024-11-11 DIAGNOSIS — M81.0 POSTMENOPAUSAL OSTEOPOROSIS: Primary | ICD-10-CM

## 2024-11-12 ENCOUNTER — OFFICE VISIT (OUTPATIENT)
Dept: RHEUMATOLOGY | Age: 79
End: 2024-11-12
Payer: MEDICARE

## 2024-11-12 VITALS
BODY MASS INDEX: 30.51 KG/M2 | HEIGHT: 63 IN | WEIGHT: 172.2 LBS | RESPIRATION RATE: 12 BRPM | HEART RATE: 78 BPM | DIASTOLIC BLOOD PRESSURE: 60 MMHG | SYSTOLIC BLOOD PRESSURE: 118 MMHG | OXYGEN SATURATION: 98 %

## 2024-11-12 DIAGNOSIS — G35 MS (MULTIPLE SCLEROSIS) (HCC): Primary | ICD-10-CM

## 2024-11-12 DIAGNOSIS — Z79.52 CURRENT CHRONIC USE OF SYSTEMIC STEROIDS: ICD-10-CM

## 2024-11-12 DIAGNOSIS — C91.Z0 T-CELL LARGE GRANULAR LYMPHOCYTIC LEUKEMIA (HCC): ICD-10-CM

## 2024-11-12 DIAGNOSIS — M81.0 POSTMENOPAUSAL OSTEOPOROSIS: ICD-10-CM

## 2024-11-12 DIAGNOSIS — M05.9 SEROPOSITIVE RHEUMATOID ARTHRITIS (HCC): ICD-10-CM

## 2024-11-12 PROCEDURE — 99215 OFFICE O/P EST HI 40 MIN: CPT | Performed by: INTERNAL MEDICINE

## 2024-11-12 PROCEDURE — G2211 COMPLEX E/M VISIT ADD ON: HCPCS | Performed by: INTERNAL MEDICINE

## 2024-11-12 PROCEDURE — 1160F RVW MEDS BY RX/DR IN RCRD: CPT | Performed by: INTERNAL MEDICINE

## 2024-11-12 PROCEDURE — 1159F MED LIST DOCD IN RCRD: CPT | Performed by: INTERNAL MEDICINE

## 2024-11-12 PROCEDURE — 1123F ACP DISCUSS/DSCN MKR DOCD: CPT | Performed by: INTERNAL MEDICINE

## 2024-11-12 RX ORDER — FOLIC ACID 1 MG/1
1 TABLET ORAL DAILY
Qty: 180 TABLET | Refills: 1 | Status: SHIPPED | OUTPATIENT
Start: 2024-11-12

## 2024-11-12 RX ORDER — PREDNISONE 20 MG/1
60 TABLET ORAL DAILY
Qty: 30 TABLET | Refills: 0 | Status: SHIPPED | OUTPATIENT
Start: 2024-11-12 | End: 2024-11-26

## 2024-11-12 ASSESSMENT — ROUTINE ASSESSMENT OF PATIENT INDEX DATA (RAPID3)
ON A SCALE OF ONE TO TEN, HOW MUCH PAIN HAVE YOU HAD BECAUSE OF YOUR CONDITION OVER THE PAST WEEK?: 10
WHEN YOU AWAKENED IN THE MORNING OVER THE LAST WEEK, PLEASE INDICATE THE AMOUNT OF TIME IT TAKES UNTIL YOU ARE AS LIMBER AS YOU WILL BE FOR THE DAY: > 1 HOUR
ON A SCALE OF ONE TO TEN, HOW MUCH OF A PROBLEM HAS UNUSUAL FATIGUE OR TIREDNESS BEEN FOR YOU OVER THE PAST WEEK?: 3
ON A SCALE OF ONE TO TEN, CONSIDERING ALL THE WAYS IN WHICH ILLNESS AND HEALTH CONDITIONS MAY AFFECT YOU AT THIS TIME, PLEASE INDICATE BELOW HOW YOU ARE DOING:: 8
ON A SCALE OF ONE TO TEN, HOW DIFFICULT WAS IT FOR YOU TO COMPLETE THE LISTED DAILY PHYSICAL TASKS OVER THE LAST WEEK: 1.8

## 2024-11-12 NOTE — PROGRESS NOTES
previously used x 2 doses ending in 2022.  Update labs and DEXA today  - Patient/daughter will verify dose/type of calcium and vitamin D      LGL    Seropositive rheumatoid arthritis (HCC)    Encounter for other specified special examinations    Current chronic use of systemic steroids    Started on methotrexate 20 Mg weekly, prednisone 60 Mg to take x 1 month with taper.  Unfortunately patient ran out of prednisone over the weekend.  I contacted oncology and discussed prednisone: Will send in several weeks worth of medication for her to resume now and they will follow-up with Dr. Hinds later this week to discuss taper.  - Add folic acid 1 Mg  - Referral to neurology regarding diagnosis of MS in the setting of RA.  TNF inhibitors would be relatively contraindicated.  Discussed rituximab but would prefer their input.  Patient and daughter are in agreement to do this.  If neutropenia improves, DMARD could be an option as well  -Referral to new PCP placed per patient request    50 minutes in total with greater than 75% in face-to-face time  Follow-up in 4 weeks with standing labs          Cammy Maloney MD  [facility]  [address] [place] [id number] [phone]

## 2024-11-12 NOTE — PATIENT INSTRUCTIONS
Josue Diaz MD    Let me know of calcium vit D dose/type    Let me know about dentist so we can contact them

## 2024-11-12 NOTE — TELEPHONE ENCOUNTER
Pharmacy called and the prednisone was only sent it with 30 tablets with is only 10 days worth. It needs to be 42 tablets for 14 days.     They need a new RX,     I have it pended

## 2024-11-13 DIAGNOSIS — C91.Z0 T-CELL LARGE GRANULAR LYMPHOCYTIC LEUKEMIA (HCC): Primary | ICD-10-CM

## 2024-11-13 RX ORDER — PREDNISONE 20 MG/1
60 TABLET ORAL DAILY
Qty: 42 TABLET | Refills: 0 | Status: SHIPPED | OUTPATIENT
Start: 2024-11-13 | End: 2024-11-27

## 2024-11-14 ENCOUNTER — OFFICE VISIT (OUTPATIENT)
Dept: ONCOLOGY | Age: 79
End: 2024-11-14
Payer: MEDICARE

## 2024-11-14 ENCOUNTER — HOSPITAL ENCOUNTER (OUTPATIENT)
Dept: LAB | Age: 79
Discharge: HOME | End: 2024-11-14
Payer: MEDICARE

## 2024-11-14 VITALS
WEIGHT: 177.9 LBS | BODY MASS INDEX: 31.52 KG/M2 | SYSTOLIC BLOOD PRESSURE: 161 MMHG | RESPIRATION RATE: 18 BRPM | OXYGEN SATURATION: 95 % | HEIGHT: 63 IN | DIASTOLIC BLOOD PRESSURE: 77 MMHG | TEMPERATURE: 97.5 F | HEART RATE: 76 BPM

## 2024-11-14 DIAGNOSIS — M05.9 SEROPOSITIVE RHEUMATOID ARTHRITIS (HCC): ICD-10-CM

## 2024-11-14 DIAGNOSIS — D70.9 NEUTROPENIA, UNSPECIFIED TYPE (HCC): ICD-10-CM

## 2024-11-14 DIAGNOSIS — C91.Z0 T-CELL LARGE GRANULAR LYMPHOCYTIC LEUKEMIA (HCC): Primary | ICD-10-CM

## 2024-11-14 DIAGNOSIS — C91.Z0 T-CELL LARGE GRANULAR LYMPHOCYTIC LEUKEMIA (HCC): ICD-10-CM

## 2024-11-14 LAB
ALBUMIN SERPL-MCNC: 3.2 G/DL (ref 3.2–4.6)
ALBUMIN/GLOB SERPL: 0.7 (ref 1–1.9)
ALP SERPL-CCNC: 67 U/L (ref 35–104)
ALT SERPL-CCNC: 18 U/L (ref 8–45)
ANION GAP SERPL CALC-SCNC: 11 MMOL/L (ref 7–16)
AST SERPL-CCNC: 18 U/L (ref 15–37)
BASOPHILS # BLD: 0 K/UL (ref 0–0.2)
BASOPHILS NFR BLD: 0 % (ref 0–2)
BILIRUB SERPL-MCNC: 0.2 MG/DL (ref 0–1.2)
BUN SERPL-MCNC: 29 MG/DL (ref 8–23)
CALCIUM SERPL-MCNC: 9.5 MG/DL (ref 8.8–10.2)
CHLORIDE SERPL-SCNC: 103 MMOL/L (ref 98–107)
CO2 SERPL-SCNC: 26 MMOL/L (ref 20–29)
CREAT SERPL-MCNC: 0.7 MG/DL (ref 0.6–1.1)
CRP SERPL-MCNC: 2.7 MG/DL (ref 0–0.4)
DIFFERENTIAL METHOD BLD: ABNORMAL
EOSINOPHIL # BLD: 0 K/UL (ref 0–0.8)
EOSINOPHIL NFR BLD: 1 % (ref 0.5–7.8)
ERYTHROCYTE [DISTWIDTH] IN BLOOD BY AUTOMATED COUNT: 14.2 % (ref 11.9–14.6)
ERYTHROCYTE [SEDIMENTATION RATE] IN BLOOD: 41 MM/HR (ref 0–30)
GLOBULIN SER CALC-MCNC: 4.5 G/DL (ref 2.3–3.5)
GLUCOSE SERPL-MCNC: 134 MG/DL (ref 70–99)
HCT VFR BLD AUTO: 39.7 % (ref 35.8–46.3)
HGB BLD-MCNC: 12.1 G/DL (ref 11.7–15.4)
IMM GRANULOCYTES # BLD AUTO: 0 K/UL (ref 0–0.5)
IMM GRANULOCYTES NFR BLD AUTO: 0 % (ref 0–5)
LYMPHOCYTES # BLD: 1.2 K/UL (ref 0.5–4.6)
LYMPHOCYTES NFR BLD: 32 % (ref 13–44)
MCH RBC QN AUTO: 27.9 PG (ref 26.1–32.9)
MCHC RBC AUTO-ENTMCNC: 30.5 G/DL (ref 31.4–35)
MCV RBC AUTO: 91.7 FL (ref 82–102)
MONOCYTES # BLD: 0.2 K/UL (ref 0.1–1.3)
MONOCYTES NFR BLD: 6 % (ref 4–12)
NEUTS SEG # BLD: 2.2 K/UL (ref 1.7–8.2)
NEUTS SEG NFR BLD: 61 % (ref 43–78)
NRBC # BLD: 0 K/UL (ref 0–0.2)
PLATELET # BLD AUTO: 176 K/UL (ref 150–450)
PMV BLD AUTO: 9.6 FL (ref 9.4–12.3)
POTASSIUM SERPL-SCNC: 4.2 MMOL/L (ref 3.5–5.1)
PROT SERPL-MCNC: 7.7 G/DL (ref 6.3–8.2)
RBC # BLD AUTO: 4.33 M/UL (ref 4.05–5.2)
SODIUM SERPL-SCNC: 140 MMOL/L (ref 136–145)
WBC # BLD AUTO: 3.6 K/UL (ref 4.3–11.1)

## 2024-11-14 PROCEDURE — 85025 COMPLETE CBC W/AUTO DIFF WBC: CPT

## 2024-11-14 PROCEDURE — 1159F MED LIST DOCD IN RCRD: CPT | Performed by: INTERNAL MEDICINE

## 2024-11-14 PROCEDURE — 1126F AMNT PAIN NOTED NONE PRSNT: CPT | Performed by: INTERNAL MEDICINE

## 2024-11-14 PROCEDURE — 36415 COLL VENOUS BLD VENIPUNCTURE: CPT

## 2024-11-14 PROCEDURE — 99214 OFFICE O/P EST MOD 30 MIN: CPT | Performed by: INTERNAL MEDICINE

## 2024-11-14 PROCEDURE — 80053 COMPREHEN METABOLIC PANEL: CPT

## 2024-11-14 PROCEDURE — 1123F ACP DISCUSS/DSCN MKR DOCD: CPT | Performed by: INTERNAL MEDICINE

## 2024-11-14 PROCEDURE — 1160F RVW MEDS BY RX/DR IN RCRD: CPT | Performed by: INTERNAL MEDICINE

## 2024-11-14 NOTE — PROGRESS NOTES
11/14/24  2:52 PM   Result Value Ref Range    WBC 3.6 (L) 4.3 - 11.1 K/uL    RBC 4.33 4.05 - 5.2 M/uL    Hemoglobin 12.1 11.7 - 15.4 g/dL    Hematocrit 39.7 35.8 - 46.3 %    MCV 91.7 82.0 - 102.0 FL    MCH 27.9 26.1 - 32.9 PG    MCHC 30.5 (L) 31.4 - 35.0 g/dL    RDW 14.2 11.9 - 14.6 %    Platelets 176 150 - 450 K/uL    MPV 9.6 9.4 - 12.3 FL    nRBC 0.00 0.0 - 0.2 K/uL    Neutrophils % 61 43 - 78 %    Lymphocytes % 32 13 - 44 %    Monocytes % 6 4.0 - 12.0 %    Eosinophils % 1 0.5 - 7.8 %    Basophils % 0 0.0 - 2.0 %    Immature Granulocytes % 0 0.0 - 5.0 %    Neutrophils Absolute 2.2 1.7 - 8.2 K/UL    Lymphocytes Absolute 1.2 0.5 - 4.6 K/UL    Monocytes Absolute 0.2 0.1 - 1.3 K/UL    Eosinophils Absolute 0.0 0.0 - 0.8 K/UL    Basophils Absolute 0.0 0.0 - 0.2 K/UL    Immature Granulocytes Absolute 0.0 0.0 - 0.5 K/UL    Differential Type AUTOMATED         Imaging:  No results found for this or any previous visit.    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 Neutropenia, unspecified type (HCC)              78 y.o. F consulted for neutropenia presented to [facility] on 10/3/2024 with daughter.  Lab records showed neutropenia started in 2022 with a previous normal baseline, unremarkable CBC otherwise, discussed most common cause being drug/toxin effect but not able to find provoking agent, discussed possibility of Felty syndrome/LGL and rheumatoid arthritis and arrange spleen ultrasound, bone marrow biopsy showed a T-cell LGL, discussed and agreed to start methotrexate 10 mg/m² weekly, prednisone 1 mg/kg daily, discussed toxicities and management, return 11/14/2024 and WBC up to 3.6 and ANC 2.2, vertical response and continue prednisone at current dose and return in 2 weeks tentatively start weaning, continue methotrexate 20 mg/week, continue Protonix/vitamin D/calcium, return 2 weeks with

## 2024-11-14 NOTE — PATIENT INSTRUCTIONS
Differential Type 11/14/2024 AUTOMATED    Final                 Please refer to After Visit Summary or [facility] for upcoming appointment information. Please call our office for rescheduling needs at least 24 hours before your scheduled appointment time.If you have any questions regarding your upcoming schedule please reach out to your care team through [employer] or call [phone].     Please notify your assigned Nurse Navigator of any unplanned hospital admissions or Emergency Room visits within 24 hours of discharge.    -------------------------------------------------------------------------------------------------------------------  Please call our office at [phone] if you have any  of the following symptoms:   Fever of 100.5 or greater  Chills  Shortness of breath  Swelling or pain in one leg    After office hours an answering service is available and will contact a provider for emergencies or if you are experiencing any of the above symptoms.  GABI LEGER RN

## 2024-11-19 ENCOUNTER — TELEPHONE (OUTPATIENT)
Dept: INTERNAL MEDICINE CLINIC | Facility: CLINIC | Age: 79
End: 2024-11-19

## 2024-11-19 ENCOUNTER — PATIENT MESSAGE (OUTPATIENT)
Dept: INTERNAL MEDICINE CLINIC | Facility: CLINIC | Age: 79
End: 2024-11-19

## 2024-11-19 NOTE — TELEPHONE ENCOUNTER
Patients son called and states that the patient is needing a letter stating that she is not able to live alone due to her health conditions for her medicaid. Please Advise.

## 2024-11-20 ENCOUNTER — OFFICE VISIT (OUTPATIENT)
Age: 79
End: 2024-11-20
Payer: MEDICARE

## 2024-11-20 DIAGNOSIS — M05.79 RHEUMATOID ARTHRITIS INVOLVING MULTIPLE SITES WITH POSITIVE RHEUMATOID FACTOR (HCC): Chronic | ICD-10-CM

## 2024-11-20 PROCEDURE — 99214 OFFICE O/P EST MOD 30 MIN: CPT | Performed by: PHYSICIAN ASSISTANT

## 2024-11-20 PROCEDURE — 1123F ACP DISCUSS/DSCN MKR DOCD: CPT | Performed by: PHYSICIAN ASSISTANT

## 2024-11-20 RX ORDER — HYDROCODONE BITARTRATE AND ACETAMINOPHEN 7.5; 325 MG/1; MG/1
1 TABLET ORAL EVERY 6 HOURS PRN
Qty: 120 TABLET | Refills: 0 | Status: SHIPPED | OUTPATIENT
Start: 2024-11-22 | End: 2024-12-22

## 2024-11-20 RX ORDER — HYDROCODONE BITARTRATE AND ACETAMINOPHEN 7.5; 325 MG/1; MG/1
1 TABLET ORAL EVERY 6 HOURS PRN
Qty: 120 TABLET | Refills: 0 | Status: SHIPPED | OUTPATIENT
Start: 2024-12-22 | End: 2025-01-21

## 2024-11-20 ASSESSMENT — ENCOUNTER SYMPTOMS
EYES NEGATIVE: 1
SHORTNESS OF BREATH: 0
ABDOMINAL PAIN: 0
ALLERGIC/IMMUNOLOGIC NEGATIVE: 1

## 2024-11-20 NOTE — PROGRESS NOTES
Ms Jaquez is a first time f/u for a new patient visit for initiating Norco 7.5mg QID for her chronic pain related to rheumatoid arthritis as well as neck pain. We ordered a neck xray at her initial visit but that has not been completed. She did have bone density completed which showed a nearly 50% chance of a major osteoporotic fracture.

## 2024-11-20 NOTE — PROGRESS NOTES
Chronic Pain Consult Note      Plan:    A comprehensive pain management plan may consist of the following: Testing, Therapy, Medications, Interventions, Consults, and Follow up.    Rheumatoid Arthritis  Continue close follow up with Dr Maloney for chronic joint pain.   Working with Dr SHI on reducing prednisone  Bone density scan showed T-scores ranging from -1.6 in the lumbar spine to -2.7 in the left femoral neck.  Her risk of a major osteoporotic fracture is 49.1%.  T-Cell Large Granular Lymphocytic Leukemia   Started on MTX recently.   Cervical spine pain  Obtain xray of cervical spine AP/Lat which she will get in the near future.   Briefly discussed interventional options for her neck pain  Discussed that arm symptoms could certainly be coming from her neck and MRI may be necessary in the future.   Chronic Pain Syndrome  Obtain UDS 10/23/24  Obtain CSP 10/23/24. Expectations of pain management discussed including no sharing of meds, taking as prescribed and bring pill bottles to follow up.   Continue Norco 7.5mg QID. Medicine is due 11/22 and 12/22. F/u before 1/21 at which time we can discuss cervical xray findings     Was medication brought in? Yes  Medication   HYDROCOD/ACETAM 7.5-325MG TAB     Last Filled 10.23.2024  Last Dose NOON TODAY  Remaining 15    General Recommendations: The pain condition that the patient suffers from is best treated with a multidisciplinary approach that involves an increase in physical activity to prevent de-conditioning and worsening of the pain cycle, as well as psychological counseling (formal and/or informal) to address the co morbid psychological effects of pain. Treatment will often involve judicious use of pain medications and interventional procedures to decrease the pain, allowing the patient to participate in the physical activity that will ultimately produce long-lasting pain reductions. The goal of the multidisciplinary approach is to return the patient to a higher

## 2024-12-03 ENCOUNTER — HOSPITAL ENCOUNTER (OUTPATIENT)
Dept: LAB | Age: 79
Discharge: HOME | End: 2024-12-03
Payer: MEDICARE

## 2024-12-03 ENCOUNTER — OFFICE VISIT (OUTPATIENT)
Dept: ONCOLOGY | Age: 79
End: 2024-12-03
Payer: MEDICARE

## 2024-12-03 VITALS
DIASTOLIC BLOOD PRESSURE: 63 MMHG | TEMPERATURE: 97.5 F | WEIGHT: 173.9 LBS | HEIGHT: 63 IN | OXYGEN SATURATION: 96 % | RESPIRATION RATE: 18 BRPM | BODY MASS INDEX: 30.81 KG/M2 | HEART RATE: 67 BPM | SYSTOLIC BLOOD PRESSURE: 164 MMHG

## 2024-12-03 DIAGNOSIS — C91.Z0 T-CELL LARGE GRANULAR LYMPHOCYTIC LEUKEMIA (HCC): Primary | ICD-10-CM

## 2024-12-03 DIAGNOSIS — C91.Z0 T-CELL LARGE GRANULAR LYMPHOCYTIC LEUKEMIA (HCC): ICD-10-CM

## 2024-12-03 DIAGNOSIS — Z79.899 HIGH RISK MEDICATION USE: ICD-10-CM

## 2024-12-03 DIAGNOSIS — M81.0 AGE RELATED OSTEOPOROSIS, UNSPECIFIED PATHOLOGICAL FRACTURE PRESENCE: ICD-10-CM

## 2024-12-03 LAB
25(OH)D3 SERPL-MCNC: 44.4 NG/ML (ref 30–100)
ALBUMIN SERPL-MCNC: 3.3 G/DL (ref 3.2–4.6)
ALBUMIN/GLOB SERPL: 0.8 (ref 1–1.9)
ALP SERPL-CCNC: 61 U/L (ref 35–104)
ALT SERPL-CCNC: 33 U/L (ref 8–45)
ANION GAP SERPL CALC-SCNC: 10 MMOL/L (ref 7–16)
AST SERPL-CCNC: 22 U/L (ref 15–37)
BASOPHILS # BLD: 0 K/UL (ref 0–0.2)
BASOPHILS NFR BLD: 0 % (ref 0–2)
BILIRUB SERPL-MCNC: 0.6 MG/DL (ref 0–1.2)
BUN SERPL-MCNC: 21 MG/DL (ref 8–23)
CALCIUM SERPL-MCNC: 9.2 MG/DL (ref 8.8–10.2)
CHLORIDE SERPL-SCNC: 101 MMOL/L (ref 98–107)
CO2 SERPL-SCNC: 27 MMOL/L (ref 20–29)
CREAT SERPL-MCNC: 0.71 MG/DL (ref 0.6–1.1)
DIFFERENTIAL METHOD BLD: ABNORMAL
EOSINOPHIL # BLD: 0 K/UL (ref 0–0.8)
EOSINOPHIL NFR BLD: 1 % (ref 0.5–7.8)
ERYTHROCYTE [DISTWIDTH] IN BLOOD BY AUTOMATED COUNT: 15.7 % (ref 11.9–14.6)
GLOBULIN SER CALC-MCNC: 4 G/DL (ref 2.3–3.5)
GLUCOSE SERPL-MCNC: 105 MG/DL (ref 70–99)
HCT VFR BLD AUTO: 41 % (ref 35.8–46.3)
HGB BLD-MCNC: 12.8 G/DL (ref 11.7–15.4)
IMM GRANULOCYTES # BLD AUTO: 0 K/UL (ref 0–0.5)
IMM GRANULOCYTES NFR BLD AUTO: 0 % (ref 0–5)
LYMPHOCYTES # BLD: 2.5 K/UL (ref 0.5–4.6)
LYMPHOCYTES NFR BLD: 52 % (ref 13–44)
MCH RBC QN AUTO: 28.4 PG (ref 26.1–32.9)
MCHC RBC AUTO-ENTMCNC: 31.2 G/DL (ref 31.4–35)
MCV RBC AUTO: 91.1 FL (ref 82–102)
MONOCYTES # BLD: 0.3 K/UL (ref 0.1–1.3)
MONOCYTES NFR BLD: 5 % (ref 4–12)
NEUTS SEG # BLD: 2.1 K/UL (ref 1.7–8.2)
NEUTS SEG NFR BLD: 42 % (ref 43–78)
NRBC # BLD: 0 K/UL (ref 0–0.2)
PLATELET # BLD AUTO: 165 K/UL (ref 150–450)
PMV BLD AUTO: 8.4 FL (ref 9.4–12.3)
POTASSIUM SERPL-SCNC: 3.7 MMOL/L (ref 3.5–5.1)
PROT SERPL-MCNC: 7.3 G/DL (ref 6.3–8.2)
RBC # BLD AUTO: 4.5 M/UL (ref 4.05–5.2)
SODIUM SERPL-SCNC: 138 MMOL/L (ref 136–145)
WBC # BLD AUTO: 4.8 K/UL (ref 4.3–11.1)

## 2024-12-03 PROCEDURE — 80053 COMPREHEN METABOLIC PANEL: CPT

## 2024-12-03 PROCEDURE — 1123F ACP DISCUSS/DSCN MKR DOCD: CPT | Performed by: INTERNAL MEDICINE

## 2024-12-03 PROCEDURE — 99214 OFFICE O/P EST MOD 30 MIN: CPT | Performed by: INTERNAL MEDICINE

## 2024-12-03 PROCEDURE — 85025 COMPLETE CBC W/AUTO DIFF WBC: CPT

## 2024-12-03 PROCEDURE — 1159F MED LIST DOCD IN RCRD: CPT | Performed by: INTERNAL MEDICINE

## 2024-12-03 PROCEDURE — 1125F AMNT PAIN NOTED PAIN PRSNT: CPT | Performed by: INTERNAL MEDICINE

## 2024-12-03 PROCEDURE — 82306 VITAMIN D 25 HYDROXY: CPT

## 2024-12-03 PROCEDURE — 1160F RVW MEDS BY RX/DR IN RCRD: CPT | Performed by: INTERNAL MEDICINE

## 2024-12-03 PROCEDURE — 36415 COLL VENOUS BLD VENIPUNCTURE: CPT

## 2024-12-03 RX ORDER — PREDNISONE 20 MG/1
30 TABLET ORAL DAILY
Qty: 30 TABLET | Refills: 0 | Status: SHIPPED | OUTPATIENT
Start: 2024-12-03 | End: 2024-12-05 | Stop reason: SDUPTHER

## 2024-12-03 ASSESSMENT — PATIENT HEALTH QUESTIONNAIRE - PHQ9
SUM OF ALL RESPONSES TO PHQ QUESTIONS 1-9: 0
SUM OF ALL RESPONSES TO PHQ QUESTIONS 1-9: 0
1. LITTLE INTEREST OR PLEASURE IN DOING THINGS: NOT AT ALL
SUM OF ALL RESPONSES TO PHQ QUESTIONS 1-9: 0
SUM OF ALL RESPONSES TO PHQ9 QUESTIONS 1 & 2: 0
SUM OF ALL RESPONSES TO PHQ QUESTIONS 1-9: 0
2. FEELING DOWN, DEPRESSED OR HOPELESS: NOT AT ALL

## 2024-12-03 NOTE — PROGRESS NOTES
27 20 - 29 mmol/L    Anion Gap 10 7 - 16 mmol/L    Glucose 105 (H) 70 - 99 mg/dL    BUN 21 8 - 23 MG/DL    Creatinine 0.71 0.60 - 1.10 MG/DL    Est, Glom Filt Rate 86 >60 ml/min/1.73m2    Calcium 9.2 8.8 - 10.2 MG/DL    Total Bilirubin 0.6 0.0 - 1.2 MG/DL    ALT 33 8 - 45 U/L    AST 22 15 - 37 U/L    Alk Phosphatase 61 35 - 104 U/L    Total Protein 7.3 6.3 - 8.2 g/dL    Albumin 3.3 3.2 - 4.6 g/dL    Globulin 4.0 (H) 2.3 - 3.5 g/dL    Albumin/Globulin Ratio 0.8 (L) 1.0 - 1.9     CBC with Auto Differential    Collection Time: 12/03/24 12:06 PM   Result Value Ref Range    WBC 4.8 4.3 - 11.1 K/uL    RBC 4.50 4.05 - 5.2 M/uL    Hemoglobin 12.8 11.7 - 15.4 g/dL    Hematocrit 41.0 35.8 - 46.3 %    MCV 91.1 82.0 - 102.0 FL    MCH 28.4 26.1 - 32.9 PG    MCHC 31.2 (L) 31.4 - 35.0 g/dL    RDW 15.7 (H) 11.9 - 14.6 %    Platelets 165 150 - 450 K/uL    MPV 8.4 (L) 9.4 - 12.3 FL    nRBC 0.00 0.0 - 0.2 K/uL    Neutrophils % 42 (L) 43 - 78 %    Lymphocytes % 52 (H) 13 - 44 %    Monocytes % 5 4.0 - 12.0 %    Eosinophils % 1 0.5 - 7.8 %    Basophils % 0 0.0 - 2.0 %    Immature Granulocytes % 0 0.0 - 5.0 %    Neutrophils Absolute 2.1 1.7 - 8.2 K/UL    Lymphocytes Absolute 2.5 0.5 - 4.6 K/UL    Monocytes Absolute 0.3 0.1 - 1.3 K/UL    Eosinophils Absolute 0.0 0.0 - 0.8 K/UL    Basophils Absolute 0.0 0.0 - 0.2 K/UL    Immature Granulocytes Absolute 0.0 0.0 - 0.5 K/UL    Differential Type AUTOMATED         Imaging:  No results found for this or any previous visit.    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 High risk medication use        3. Age related osteoporosis, unspecified pathological fracture presence                79 y.o. F consulted for neutropenia presented to [facility] on 10/[facility]/2024 with daughter.  Lab records showed neutropenia started in 2022 with a previous normal baseline, unremarkable CBC otherwise, discussed most common cause being drug/toxin effect but not able to

## 2024-12-03 NOTE — PATIENT INSTRUCTIONS
rescheduling needs at least 24 hours before your scheduled appointment time.If you have any questions regarding your upcoming schedule please reach out to your care team through [employer] or call [phone].     Please notify your assigned Nurse Navigator of any unplanned hospital admissions or Emergency Room visits within 24 hours of discharge.    -------------------------------------------------------------------------------------------------------------------  Please call our office at [phone] if you have any  of the following symptoms:   Fever of 100.5 or greater  Chills  Shortness of breath  Swelling or pain in one leg    After office hours an answering service is available and will contact a provider for emergencies or if you are experiencing any of the above symptoms.  GABI LEGER RN

## 2024-12-04 ENCOUNTER — TELEPHONE (OUTPATIENT)
Dept: RHEUMATOLOGY | Age: 79
End: 2024-12-04

## 2024-12-04 NOTE — TELEPHONE ENCOUNTER
PHONE CALL to [facility] [phone]. Pt has PA on file for Prolia, but auth is in Dr. Mai's name, but she is now seeing Dr. Maloney. Need to addend auth. Spoke with Yoana WANG approval number will remain the same- [id number]. They are faxing a new approval letter to the office.     Scheduled the patient to have her Prolia injection on the same day as her OV on 12/11/24.

## 2024-12-05 DIAGNOSIS — C91.Z0 T-CELL LARGE GRANULAR LYMPHOCYTIC LEUKEMIA (HCC): ICD-10-CM

## 2024-12-05 RX ORDER — PREDNISONE 20 MG/1
30 TABLET ORAL DAILY
Qty: 30 TABLET | Refills: 0 | Status: SHIPPED | OUTPATIENT
Start: 2024-12-05 | End: 2024-12-06 | Stop reason: SDUPTHER

## 2024-12-06 DIAGNOSIS — C91.Z0 T-CELL LARGE GRANULAR LYMPHOCYTIC LEUKEMIA (HCC): ICD-10-CM

## 2024-12-06 RX ORDER — PREDNISONE 20 MG/1
30 TABLET ORAL DAILY
Qty: 30 TABLET | Refills: 0 | Status: SHIPPED | OUTPATIENT
Start: 2024-12-06 | End: 2025-01-05

## 2024-12-06 NOTE — TELEPHONE ENCOUNTER
Physician provider: Dr. Hinds  Reason for today's call (Please detail here patients chief complaint): Pt's daughter request refill for prednisone 20 mg for her mother. Per daughter she is out and please send today to [facility] on [address]. The daughter do not want script sent to [facility] they having issues with the fax.   Last office visit:na   Callback Number: [phone]  Was callback number verified?: Yes  Preferred pharmacy (If refill request): [facility] on [address].   Calls to office within the last 48 hours?:No    Patient notified that their information will be routed to the [facility] clinical triage team for review. Patient is advised that they will receive a phone call from the triage department. If symptom related and symptoms worsen before receiving a call back, the patient has been advised to proceed to the nearest ED.         Refill request from [facility] for:   Lamotrigine 100 mg daily   Allopurinol 100 mg, 2 tabs daily      Last office visit: 1/20/17 med check  Last refill:  Lamotrigine, 8/19/16 #30 +5 refills                     Allopurinol, 6/10/16 #180 +1 refill    Please advise with refill.

## 2024-12-06 NOTE — TELEPHONE ENCOUNTER
Requesting prednisone script that was sent yesterday go to a different pharmacy. They would like it to go to [facility]

## 2024-12-09 ENCOUNTER — TELEPHONE (OUTPATIENT)
Dept: ONCOLOGY | Age: 79
End: 2024-12-09

## 2024-12-09 DIAGNOSIS — C91.Z0 T-CELL LARGE GRANULAR LYMPHOCYTIC LEUKEMIA (HCC): ICD-10-CM

## 2024-12-09 RX ORDER — PREDNISONE 20 MG/1
30 TABLET ORAL DAILY
Qty: 30 TABLET | Refills: 0 | Status: SHIPPED | OUTPATIENT
Start: 2024-12-09 | End: 2025-01-08

## 2024-12-09 NOTE — TELEPHONE ENCOUNTER
Physician provider: Dr. Hinds  Reason for today's call (Please detail here patients chief complaint): Medication clarification  Last office visit:na  Patient Callback Number: [phone]   Was callback number verified?: Yes  Preferred pharmacy (If refill request): [facility]  Calls to office within the last 48 hours?:No    Patient notified that their information will be routed to the [facility] clinical triage team for review. Patient is advised that they will receive a phone call from the triage department. If symptom related and symptoms worsen before receiving a call back, the patient has been advised to proceed to the nearest ED.      Clarification on Prednisone instructions for pt.    Theresa from [facility]    [phone]

## 2024-12-09 NOTE — TELEPHONE ENCOUNTER
Physician provider: Dr. Hinds  Reason for today's call (Please detail here patients chief complaint): Pt's daughter sent message with photo re: rash on leg and would like to hear back from someone on whether it can wait til Rheumatology appt on Wednesday or does she need to be seen sooner.   Last office visit:na  Patient Callback Number: [phone]   Was callback number verified?: Yes  Preferred pharmacy (If refill request): na  Calls to office within the last 48 hours?:Yes    Patient notified that their information will be routed to the [facility] clinical triage team for review. Patient is advised that they will receive a phone call from the triage department. If symptom related and symptoms worsen before receiving a call back, the patient has been advised to proceed to the nearest ED.      Pt's daughter sent message with photo re: rash on leg and would like to hear back from someone on whether it can wait til Rheumatology appt on Wednesday or does she need to be seen sooner.

## 2024-12-11 ENCOUNTER — NURSE ONLY (OUTPATIENT)
Dept: RHEUMATOLOGY | Age: 79
End: 2024-12-11
Payer: MEDICARE

## 2024-12-11 ENCOUNTER — OFFICE VISIT (OUTPATIENT)
Dept: RHEUMATOLOGY | Age: 79
End: 2024-12-11
Payer: MEDICARE

## 2024-12-11 VITALS
BODY MASS INDEX: 31.54 KG/M2 | HEART RATE: 78 BPM | DIASTOLIC BLOOD PRESSURE: 78 MMHG | RESPIRATION RATE: 13 BRPM | WEIGHT: 178 LBS | OXYGEN SATURATION: 98 % | HEIGHT: 63 IN | SYSTOLIC BLOOD PRESSURE: 118 MMHG

## 2024-12-11 VITALS — SYSTOLIC BLOOD PRESSURE: 118 MMHG | HEART RATE: 78 BPM | DIASTOLIC BLOOD PRESSURE: 78 MMHG | TEMPERATURE: 97.8 F

## 2024-12-11 DIAGNOSIS — Z79.631 LONG TERM METHOTREXATE USER: ICD-10-CM

## 2024-12-11 DIAGNOSIS — M81.0 POSTMENOPAUSAL OSTEOPOROSIS: ICD-10-CM

## 2024-12-11 DIAGNOSIS — M54.2 CHRONIC NECK PAIN: Primary | ICD-10-CM

## 2024-12-11 DIAGNOSIS — Z79.52 CURRENT CHRONIC USE OF SYSTEMIC STEROIDS: ICD-10-CM

## 2024-12-11 DIAGNOSIS — M05.9 SEROPOSITIVE RHEUMATOID ARTHRITIS (HCC): ICD-10-CM

## 2024-12-11 DIAGNOSIS — R53.1 GENERALIZED WEAKNESS: ICD-10-CM

## 2024-12-11 DIAGNOSIS — G89.29 CHRONIC NECK PAIN: Primary | ICD-10-CM

## 2024-12-11 DIAGNOSIS — C91.Z0 T-CELL LARGE GRANULAR LYMPHOCYTIC LEUKEMIA (HCC): ICD-10-CM

## 2024-12-11 DIAGNOSIS — M81.0 POSTMENOPAUSAL OSTEOPOROSIS: Primary | ICD-10-CM

## 2024-12-11 DIAGNOSIS — Z23 ENCOUNTER FOR VACCINATION: ICD-10-CM

## 2024-12-11 PROCEDURE — 99214 OFFICE O/P EST MOD 30 MIN: CPT | Performed by: INTERNAL MEDICINE

## 2024-12-11 PROCEDURE — 1123F ACP DISCUSS/DSCN MKR DOCD: CPT | Performed by: INTERNAL MEDICINE

## 2024-12-11 PROCEDURE — 1159F MED LIST DOCD IN RCRD: CPT | Performed by: INTERNAL MEDICINE

## 2024-12-11 PROCEDURE — 96372 THER/PROPH/DIAG INJ SC/IM: CPT | Performed by: INTERNAL MEDICINE

## 2024-12-11 RX ORDER — ACETAMINOPHEN 325 MG/1
650 TABLET ORAL
OUTPATIENT
Start: 2025-06-11

## 2024-12-11 RX ORDER — HYDROCORTISONE SODIUM SUCCINATE 100 MG/2ML
100 INJECTION INTRAMUSCULAR; INTRAVENOUS
OUTPATIENT
Start: 2025-06-11

## 2024-12-11 RX ORDER — FAMOTIDINE 10 MG/ML
20 INJECTION, SOLUTION INTRAVENOUS
OUTPATIENT
Start: 2025-06-11

## 2024-12-11 RX ORDER — DIPHENHYDRAMINE HYDROCHLORIDE 50 MG/ML
50 INJECTION INTRAMUSCULAR; INTRAVENOUS
Status: DISCONTINUED | OUTPATIENT
Start: 2024-12-11 | End: 2024-12-11 | Stop reason: HOSPADM

## 2024-12-11 RX ORDER — ONDANSETRON 2 MG/ML
8 INJECTION INTRAMUSCULAR; INTRAVENOUS
OUTPATIENT
Start: 2025-06-11

## 2024-12-11 RX ORDER — EPINEPHRINE 1 MG/ML
0.3 INJECTION, SOLUTION, CONCENTRATE INTRAVENOUS PRN
Status: DISCONTINUED | OUTPATIENT
Start: 2024-12-11 | End: 2024-12-11 | Stop reason: HOSPADM

## 2024-12-11 RX ORDER — ACETAMINOPHEN 325 MG/1
650 TABLET ORAL
Status: DISCONTINUED | OUTPATIENT
Start: 2024-12-11 | End: 2024-12-11 | Stop reason: HOSPADM

## 2024-12-11 RX ORDER — SODIUM CHLORIDE 9 MG/ML
INJECTION, SOLUTION INTRAVENOUS CONTINUOUS
OUTPATIENT
Start: 2025-06-11

## 2024-12-11 RX ORDER — ALBUTEROL SULFATE 90 UG/1
4 INHALANT RESPIRATORY (INHALATION) PRN
OUTPATIENT
Start: 2025-06-11

## 2024-12-11 RX ORDER — HYDROCORTISONE SODIUM SUCCINATE 100 MG/2ML
100 INJECTION INTRAMUSCULAR; INTRAVENOUS
Status: DISCONTINUED | OUTPATIENT
Start: 2024-12-11 | End: 2024-12-11 | Stop reason: HOSPADM

## 2024-12-11 RX ORDER — EPINEPHRINE 1 MG/ML
0.3 INJECTION, SOLUTION, CONCENTRATE INTRAVENOUS PRN
OUTPATIENT
Start: 2025-06-11

## 2024-12-11 RX ORDER — SODIUM CHLORIDE 9 MG/ML
INJECTION, SOLUTION INTRAVENOUS CONTINUOUS
Status: DISCONTINUED | OUTPATIENT
Start: 2024-12-11 | End: 2024-12-11 | Stop reason: HOSPADM

## 2024-12-11 RX ORDER — ALBUTEROL SULFATE 90 UG/1
4 INHALANT RESPIRATORY (INHALATION) PRN
Status: DISCONTINUED | OUTPATIENT
Start: 2024-12-11 | End: 2024-12-11 | Stop reason: HOSPADM

## 2024-12-11 RX ORDER — ONDANSETRON 2 MG/ML
8 INJECTION INTRAMUSCULAR; INTRAVENOUS
Status: DISCONTINUED | OUTPATIENT
Start: 2024-12-11 | End: 2024-12-11 | Stop reason: HOSPADM

## 2024-12-11 RX ORDER — FAMOTIDINE 10 MG/ML
20 INJECTION, SOLUTION INTRAVENOUS
Status: DISCONTINUED | OUTPATIENT
Start: 2024-12-11 | End: 2024-12-11 | Stop reason: HOSPADM

## 2024-12-11 RX ORDER — DIPHENHYDRAMINE HYDROCHLORIDE 50 MG/ML
50 INJECTION INTRAMUSCULAR; INTRAVENOUS
OUTPATIENT
Start: 2025-06-11

## 2024-12-11 ASSESSMENT — ROUTINE ASSESSMENT OF PATIENT INDEX DATA (RAPID3)
ON A SCALE OF ONE TO TEN, CONSIDERING ALL THE WAYS IN WHICH ILLNESS AND HEALTH CONDITIONS MAY AFFECT YOU AT THIS TIME, PLEASE INDICATE BELOW HOW YOU ARE DOING:: 4
WHEN YOU AWAKENED IN THE MORNING OVER THE LAST WEEK, PLEASE INDICATE THE AMOUNT OF TIME IT TAKES UNTIL YOU ARE AS LIMBER AS YOU WILL BE FOR THE DAY: 15 MIN
ON A SCALE OF ONE TO TEN, HOW MUCH PAIN HAVE YOU HAD BECAUSE OF YOUR CONDITION OVER THE PAST WEEK?: 10
ON A SCALE OF ONE TO TEN, HOW DIFFICULT WAS IT FOR YOU TO COMPLETE THE LISTED DAILY PHYSICAL TASKS OVER THE LAST WEEK: 1.3
ON A SCALE OF ONE TO TEN, HOW MUCH OF A PROBLEM HAS UNUSUAL FATIGUE OR TIREDNESS BEEN FOR YOU OVER THE PAST WEEK?: 5

## 2024-12-11 NOTE — PROGRESS NOTES
[facility] RHEUMATOLOGY  [address] [place] [address], [address]  [place] [address]  Office : [phone], Fax: [phone]       #1 Prolia 60mg/ml injected SC today into right arm. Patient tolerated injection well. Advised patient to continue with calcium and vitamin D post injection. Advised patient to call with any problems post injection.   Medication ordered by Dr. Maloney. Pt sent into infusion room after office visit with approval from Dr. Maloney to receive Prolia injection today. Pt monitored for approximately 15 minutes post injection with no reported symptoms or side effects.    Pre-infusion/injection questionairre for osteoporosis    1. Have you had or are you planning any dental work? No    2. Are you taking your calcium and vitamin D? Yes    3. When was your last osteoporosis treatment? First Prolia injection today    4. Have you had any recent fractures? No    5. Are you currently in skilled nursing or in patient rehab? No

## 2024-12-11 NOTE — PATIENT INSTRUCTIONS
[place] - liquid calcium : goal 600 mg twice daily    Explore nondairy milk    Www.nof.org    Vaccines:  - flu , covid - hold mtx 7 days after  - prevnar 20  - shingrix

## 2024-12-11 NOTE — PROGRESS NOTES
on 4/14/22., Disp: , Rfl:         Allergies   Allergen Reactions    Statins Myalgia     Other reaction(s): Myalgia             PHYSICAL EXAM:  /78 (Site: Left Upper Arm, Position: Sitting, Cuff Size: Medium Adult)   Pulse 78   Resp 13   Ht 1.6 m (5' 3\")   Wt 80.7 kg (178 lb)   SpO2 98%   BMI 31.53 kg/m²   CONSTITUTIONAL:  The patient was in NAD.  ENT:  The OPC, MMM, lips/teeth/gums without abnormalities.  NECK:  No masses or thyromegaly  LYMPH NODES:  No cervical or axillary lymphadenopathy.  CV:  RRR no r/m/g.  Normal peripheral pulses  RESP:  CTA bilaterally.  Normal respiratory effort.  GI:  Abdomen soft, non tender, no hepatosplenomegaly  Skin: pin point erythematous nonblanching lesions on L calf. No rashes, nodules, or other lesions.    MUSCULOSKELETAL :  All joints including neck, back bilateral shoulders, elbows, wrists, MCP's, PIP's, DIP's, hips, knees, ankles, toes are within normal limits including normal gross examination, no synovitis, no tenderness, n'l ROM EXCEPT:   T: 2  S: 0    CDAI  Date:  [facility] Pt-Global MD-Global Score:  12/2024 2 0 5   3  11/2024 12 2 8   7  9/2024  16 8 10   7    Scoring:  Remission CDAI <= 2.8   Low Disease Activity CDAI > 2.8 and <= 10   Moderate Disease Activity CDAI > 10 and <= 22   High Disease Activity CDAI > 22   A CDAI reduction of 6.5 represents moderate improvement      ASSESSMENT AND PLAN:  Edelmira Jaquez is a 79 y.o. female that is here for evaluation of RA.  her problems include:    Edelmira was seen today for follow-up.    Diagnoses and all orders for this visit:    Postmenopausal osteoporosis  History of ankle fracture    History of ovarian cancer    Early menopause occurring in patient age younger than 45 years    Reviewed DEXA from 11-7-2024: Patient would be a good candidate for Prolia.  No planned dental procedures-recommend scheduling routine cleaning.  Risk factors include ovarian cancer status post hysterectomy less than 45, low trauma ankle

## 2024-12-30 ENCOUNTER — HOSPITAL ENCOUNTER (OUTPATIENT)
Dept: LAB | Age: 79
Discharge: HOME | End: 2024-12-30
Payer: MEDICARE

## 2024-12-30 ENCOUNTER — OFFICE VISIT (OUTPATIENT)
Dept: ONCOLOGY | Age: 79
End: 2024-12-30
Payer: MEDICARE

## 2024-12-30 VITALS
HEART RATE: 87 BPM | WEIGHT: 177.4 LBS | TEMPERATURE: 97.8 F | OXYGEN SATURATION: 97 % | BODY MASS INDEX: 31.43 KG/M2 | HEIGHT: 63 IN | SYSTOLIC BLOOD PRESSURE: 150 MMHG | DIASTOLIC BLOOD PRESSURE: 87 MMHG | RESPIRATION RATE: 18 BRPM

## 2024-12-30 DIAGNOSIS — C91.Z0 T-CELL LARGE GRANULAR LYMPHOCYTIC LEUKEMIA (HCC): ICD-10-CM

## 2024-12-30 DIAGNOSIS — M81.0 AGE RELATED OSTEOPOROSIS, UNSPECIFIED PATHOLOGICAL FRACTURE PRESENCE: ICD-10-CM

## 2024-12-30 DIAGNOSIS — Z79.899 HIGH RISK MEDICATION USE: ICD-10-CM

## 2024-12-30 DIAGNOSIS — Z51.11 ENCOUNTER FOR ANTINEOPLASTIC CHEMOTHERAPY: ICD-10-CM

## 2024-12-30 DIAGNOSIS — C91.Z0 T-CELL LARGE GRANULAR LYMPHOCYTIC LEUKEMIA (HCC): Primary | ICD-10-CM

## 2024-12-30 LAB
ALBUMIN SERPL-MCNC: 3.3 G/DL (ref 3.2–4.6)
ALBUMIN/GLOB SERPL: 0.8 (ref 1–1.9)
ALP SERPL-CCNC: 80 U/L (ref 35–104)
ALT SERPL-CCNC: 20 U/L (ref 8–45)
ANION GAP SERPL CALC-SCNC: 10 MMOL/L (ref 7–16)
AST SERPL-CCNC: 20 U/L (ref 15–37)
BASOPHILS # BLD: 0 K/UL (ref 0–0.2)
BASOPHILS NFR BLD: 1 % (ref 0–2)
BILIRUB SERPL-MCNC: 0.3 MG/DL (ref 0–1.2)
BUN SERPL-MCNC: 18 MG/DL (ref 8–23)
CALCIUM SERPL-MCNC: 9.7 MG/DL (ref 8.8–10.2)
CHLORIDE SERPL-SCNC: 101 MMOL/L (ref 98–107)
CO2 SERPL-SCNC: 28 MMOL/L (ref 20–29)
CREAT SERPL-MCNC: 0.69 MG/DL (ref 0.6–1.1)
DIFFERENTIAL METHOD BLD: ABNORMAL
EOSINOPHIL # BLD: 0.1 K/UL (ref 0–0.8)
EOSINOPHIL NFR BLD: 3 % (ref 0.5–7.8)
ERYTHROCYTE [DISTWIDTH] IN BLOOD BY AUTOMATED COUNT: 16.1 % (ref 11.9–14.6)
GLOBULIN SER CALC-MCNC: 4.1 G/DL (ref 2.3–3.5)
GLUCOSE SERPL-MCNC: 143 MG/DL (ref 70–99)
HCT VFR BLD AUTO: 39.8 % (ref 35.8–46.3)
HGB BLD-MCNC: 12.6 G/DL (ref 11.7–15.4)
IMM GRANULOCYTES # BLD AUTO: 0 K/UL (ref 0–0.5)
IMM GRANULOCYTES NFR BLD AUTO: 0 % (ref 0–5)
LYMPHOCYTES # BLD: 1.8 K/UL (ref 0.5–4.6)
LYMPHOCYTES NFR BLD: 39 % (ref 13–44)
MCH RBC QN AUTO: 28.9 PG (ref 26.1–32.9)
MCHC RBC AUTO-ENTMCNC: 31.7 G/DL (ref 31.4–35)
MCV RBC AUTO: 91.3 FL (ref 82–102)
MONOCYTES # BLD: 0.4 K/UL (ref 0.1–1.3)
MONOCYTES NFR BLD: 9 % (ref 4–12)
NEUTS SEG # BLD: 2.3 K/UL (ref 1.7–8.2)
NEUTS SEG NFR BLD: 48 % (ref 43–78)
NRBC # BLD: 0 K/UL (ref 0–0.2)
PLATELET # BLD AUTO: 195 K/UL (ref 150–450)
PMV BLD AUTO: 8.8 FL (ref 9.4–12.3)
POTASSIUM SERPL-SCNC: 3.8 MMOL/L (ref 3.5–5.1)
PROT SERPL-MCNC: 7.4 G/DL (ref 6.3–8.2)
RBC # BLD AUTO: 4.36 M/UL (ref 4.05–5.2)
SODIUM SERPL-SCNC: 139 MMOL/L (ref 136–145)
WBC # BLD AUTO: 4.7 K/UL (ref 4.3–11.1)

## 2024-12-30 PROCEDURE — 1125F AMNT PAIN NOTED PAIN PRSNT: CPT | Performed by: INTERNAL MEDICINE

## 2024-12-30 PROCEDURE — 1160F RVW MEDS BY RX/DR IN RCRD: CPT | Performed by: INTERNAL MEDICINE

## 2024-12-30 PROCEDURE — 1159F MED LIST DOCD IN RCRD: CPT | Performed by: INTERNAL MEDICINE

## 2024-12-30 PROCEDURE — 80053 COMPREHEN METABOLIC PANEL: CPT

## 2024-12-30 PROCEDURE — 99215 OFFICE O/P EST HI 40 MIN: CPT | Performed by: INTERNAL MEDICINE

## 2024-12-30 PROCEDURE — 85025 COMPLETE CBC W/AUTO DIFF WBC: CPT

## 2024-12-30 PROCEDURE — 1123F ACP DISCUSS/DSCN MKR DOCD: CPT | Performed by: INTERNAL MEDICINE

## 2024-12-30 PROCEDURE — 36415 COLL VENOUS BLD VENIPUNCTURE: CPT

## 2024-12-30 RX ORDER — METHOTREXATE 2.5 MG/1
20 TABLET ORAL WEEKLY
Qty: 32 TABLET | Refills: 3 | Status: SHIPPED | OUTPATIENT
Start: 2024-12-30

## 2024-12-30 ASSESSMENT — PATIENT HEALTH QUESTIONNAIRE - PHQ9
SUM OF ALL RESPONSES TO PHQ QUESTIONS 1-9: 0
SUM OF ALL RESPONSES TO PHQ9 QUESTIONS 1 & 2: 0
SUM OF ALL RESPONSES TO PHQ QUESTIONS 1-9: 0
2. FEELING DOWN, DEPRESSED OR HOPELESS: NOT AT ALL
SUM OF ALL RESPONSES TO PHQ QUESTIONS 1-9: 0
SUM OF ALL RESPONSES TO PHQ QUESTIONS 1-9: 0
1. LITTLE INTEREST OR PLEASURE IN DOING THINGS: NOT AT ALL

## 2024-12-30 NOTE — PROGRESS NOTES
Most nights [facility] Hematology & Oncology: Office Visit Progress Note    Chief Complaint:    Neutropenia  T-cell LGL leukemia/Felty syndrome    History of Present Illness:  Referral Diagnosis: Other neutropenia; Seropositive rheumatoid arthritis     Referring Provider:  Cammy Maloney MD     Primary Care Provider: Jp Tinsley DO     Family History of Cancer/ Hematology Disorders: Mother with colon cancer     Presenting Symptoms:  Fatigue, fever, arthralgias     Ms. Jaquez is a 79 y.o. white female referred for neutropenia. PMH significant for MS (diagnosed in 1977- no treatment), seropositive rheumatoid arthritis, osteoporosis, hearing loss, and ovarian cancer s/p hysterectomy ~ 1997.      9/25/24: Rheumatology f/u for seropositive rheumatoid arthritis (diagnosed in 2020) and osteoporosis. Pt has not used DMARD therapy before. RA is being treated with prednisone (currently completing steroid pack which started at 60 Mg and found that any dose less than 30 Mg does not control symptoms). Prolia was previously used x 2 doses ending in 2022 for osteoporosis. Pt is counseled to consider restarting Prolia due to severity of her osteoporosis.   -Plan for methotrexate 10-12.5 Mg weekly plus daily folic acid based on labs.  -IM Depo-Medrol 40 Mg given once now. Consider PO pred if indicated for symptos  -Laboratory results showed WBC 1.7, neutrophils 14, lymphocytes 66, monocytes 17, ANC 0.2; sed rate 49, CRP WNL; hepatitis panel nonreactive; Mag, phos, vit D, calcium, PTH intact, incubated quantiferon WNL; CO2 19, glucose 103, globulin 4.1, A/G ratio 0.8 (Epic/Labs)      9/26/24: Dr. Maloney called pt's daughter regarding abnormal neutrophil count - , wbc 1.7; she was advised pt to go to er/UTC if fever lasting +1 hour or if infection to have treatment administered.  -Abdominal US ordered to check for splenomegaly (Scheduled for 10/3/24)   -Referred to [facility]  -Review of records indicates that pt's white

## 2024-12-30 NOTE — PATIENT INSTRUCTIONS
Patient Information from Today's Visit    The members of your [facility] are listed below:    Physician Provider: Job Hinds Medical Oncologist  Advanced Practice Clinician: Nat Eubanks NP  Registered Nurse: Susana CASANOVA   Navigator:   Medical Assistant: Chloe NORMAN MA  : Yoana CASTRO   Supportive Care Services: Darby THOMAS LMSW    Diagnosis: Neutropenia  T-cell LGL leukemia/Felty syndrome      Follow Up Instructions:   Reviewed labs  Assessed leg cramps  Treatment Summary has been discussed and given to patient:      Current Labs:   Hospital Outpatient Visit on 12/30/2024   Component Date Value Ref Range Status    Sodium 12/30/2024 139  136 - 145 mmol/L Final    Potassium 12/30/2024 3.8  3.5 - 5.1 mmol/L Final    Chloride 12/30/2024 101  98 - 107 mmol/L Final    CO2 12/30/2024 28  20 - 29 mmol/L Final    Anion Gap 12/30/2024 10  7 - 16 mmol/L Final    Glucose 12/30/2024 143 (H)  70 - 99 mg/dL Final    Comment: <70 mg/dL Consistent with, but not fully diagnostic of hypoglycemia.  100 - 125 mg/dL Impaired fasting glucose/consistent with pre-diabetes mellitus.  > 126 mg/dl Fasting glucose consistent with overt diabetes mellitus      BUN 12/30/2024 18  8 - 23 MG/DL Final    Creatinine 12/30/2024 0.69  0.60 - 1.10 MG/DL Final    Est, Glom Filt Rate 12/30/2024 88  >60 ml/min/1.73m2 Final    Comment:    Pediatric calculator link: https://www.kidney.org/professionals/kdoqi/gfr_calculatorped     These results are not intended for use in patients <18 years of age.     eGFR results are calculated without a race factor using  the 2021 CKD-EPI equation. Careful clinical correlation is recommended, particularly when comparing to results calculated using previous equations.  The CKD-EPI equation is less accurate in patients with extremes of muscle mass, extra-renal metabolism of creatinine, excessive creatine ingestion, or following therapy that affects renal tubular secretion.      Calcium 12/30/2024

## 2025-01-02 ENCOUNTER — HOSPITAL ENCOUNTER (OUTPATIENT)
Dept: PHYSICAL THERAPY | Age: 80
Setting detail: RECURRING SERIES
Discharge: HOME | End: 2025-01-05
Attending: INTERNAL MEDICINE
Payer: MEDICARE

## 2025-01-02 DIAGNOSIS — M25.561 ACUTE BILATERAL KNEE PAIN: ICD-10-CM

## 2025-01-02 DIAGNOSIS — R26.81 UNSTEADINESS ON FEET: ICD-10-CM

## 2025-01-02 DIAGNOSIS — R29.898 DECREASED STRENGTH OF LOWER EXTREMITY: ICD-10-CM

## 2025-01-02 DIAGNOSIS — G35 MULTIPLE SCLEROSIS (HCC): ICD-10-CM

## 2025-01-02 DIAGNOSIS — M54.2 NECK PAIN: Primary | ICD-10-CM

## 2025-01-02 DIAGNOSIS — M25.562 ACUTE BILATERAL KNEE PAIN: ICD-10-CM

## 2025-01-02 PROCEDURE — 97110 THERAPEUTIC EXERCISES: CPT

## 2025-01-02 PROCEDURE — 97163 PT EVAL HIGH COMPLEX 45 MIN: CPT

## 2025-01-02 NOTE — THERAPY EVALUATION
Edelmira Jaquez  : 1945  Primary: [facility] Mmp Dual (Medicare Managed)  Secondary: MEDICAID [facility] [place] [address] [place] [address]  Phone: [phone]  Fax: [phone] Plan Frequency: up to 3x/week  Plan of Care/Certification Expiration Date: 25        Plan of Care/Certification Expiration Date:  Plan of Care/Certification Expiration Date: 25    Frequency/Duration: Plan Frequency: up to 3x/week      Time In/Out:   Time In: 0215  Time Out: 0      PT Visit Info:         Visit Count:  1                OUTPATIENT PHYSICAL THERAPY:             Initial Assessment 2025               Episode (RA/Neck pain)         Treatment Diagnosis:    Neck pain  Acute bilateral knee pain  Multiple sclerosis (HCC)  Decreased strength of lower extremity  Unsteadiness on feet  Medical/Referring Diagnosis:    Chronic neck pain    Referring Physician:  Cammy Maloney MD MD Orders:  PT Eval and Treat   Return MD Appt:  25  Date of Onset:    20  Allergies:  Statins  Restrictions/Precautions:    None      Medications Last Reviewed: 2025     SUBJECTIVE   History of Injury/Illness (Reason for Referral):  Pt is an 79 year old female who presents to physical therapy with primary complaints of neck and bilateral knee pain which is increased at night.  Pt states that she has difficulty sleeping and that her pain limits her participation in normal household activities such as cleaning. Pt PMHx includes RA, MS, Leukemia, OA, and history of fractures.  Pt reports that there are not specific aggravating factors that cause increase in pain but pain medication does help ease the pain.    Patient Stated Goal(s):  \"To increase mobility and quality of life\"  Initial Pain Level:      10/10   Post Session Pain Level:     10/10  Past Medical History/Comorbidities:   Ms. Jaquez  has a past medical history of Arthritis, Cancer (HCC), Ear problems, Hearing

## 2025-01-03 ASSESSMENT — PAIN SCALES - GENERAL: PAINLEVEL_OUTOF10: 10

## 2025-01-03 NOTE — PROGRESS NOTES
Exercise/activities per grid below to improve balance, coordination, and proprioception.  Required minimal visual, verbal, and tactile cues to promote dynamic balance in standing.  MANUAL THERAPY: (See minutes below):   Joint mobilization and Soft tissue mobilization was utilized and necessary because of the patient's restricted joint motion and restricted motion of soft tissue.   Date: 1/3/25  Visit 1       Modalities:                                Therapeutic Exercise: 10 min       Home Exercise program        Patient education Education on rehab process, postural cueing, functional breathing techniques                                       Proprioceptive Activities:                                Manual Therapy:                        Functional Activities:                                            Treatment/Session Summary:    Treatment Assessment:   See evaluation from today  Communication/Consultation:  Therapy Evaluation sent to referring provider  Equipment provided today:  None  Recommendations/Intent for next treatment session: Next visit will focus on introduction of therapeutic exercises to pt tolerance.    >Total Treatment Billable Duration:  35 minutes   Time In: 0215  Time Out: 0310    STEPH TONEY PT         Charge Capture  Events  [facility] Portal  Appt Desk  Attendance Report     Future Appointments   Date Time Provider Department Center   1/6/2025  2:15 PM Steph Toney, PT [facility]   1/8/2025  2:15 PM Steph Toney, PT [facility]   1/13/2025  8:30 AM Cammy Maloney MD [facility]   1/13/2025 10:30 AM Bisi Mratinez PA PM [facility]   1/13/2025  2:15 PM Steph Toney, PT [facility]   1/15/2025  2:15 PM Steph Toney, PT [facility]   1/20/2025  2:15 PM Steph Toney, PT [facility]   1/22/2025  2:15 PM Steph Toney, PT [facility]   1/27/2025  2:15 PM Steph Toney, PT [facility]   1/29/2025  2:15 PM Steph Toney, PT [facility]   3/31/2025  1:10 PM

## 2025-01-06 ENCOUNTER — HOSPITAL ENCOUNTER (OUTPATIENT)
Dept: PHYSICAL THERAPY | Age: 80
Setting detail: RECURRING SERIES
Discharge: HOME | End: 2025-01-09
Attending: INTERNAL MEDICINE
Payer: MEDICARE

## 2025-01-06 PROCEDURE — 97110 THERAPEUTIC EXERCISES: CPT

## 2025-01-06 ASSESSMENT — PAIN SCALES - GENERAL: PAINLEVEL_OUTOF10: 3

## 2025-01-06 NOTE — PROGRESS NOTES
Edelmira Jaquez  : 1945  Primary: [facility] Mmp Dual (Medicare Managed)  Secondary: MEDICAID [facility] [place] [address] [place] [address]  Phone: [phone]  Fax: [phone] Plan Frequency: up to 3x/week    Plan of Care/Certification Expiration Date: 25        Plan of Care/Certification Expiration Date:  Plan of Care/Certification Expiration Date: 25    Frequency/Duration:   Plan Frequency: up to 3x/week      Time In/Out:   Time In: 0213  Time Out: 0309      PT Visit Info:         Visit Count:  2    OUTPATIENT PHYSICAL THERAPY:   Treatment Note 2025       Episode  (RA/Neck pain)               Treatment Diagnosis:    Neck pain  Acute bilateral knee pain  Multiple sclerosis (HCC)  Decreased strength of lower extremity  Unsteadiness on feet  Medical/Referring Diagnosis:    Chronic neck pain    Referring Physician:  Cammy Maloney MD MD Orders:  PT Eval and Treat   Return MD Appt:  25   Date of Onset:  20  Allergies:   Statins  Restrictions/Precautions:   None      Interventions Planned (Treatment may consist of any combination of the following):     See Assessment Note    Subjective Comments:   Pt notes that she is not in much pain today to start her therapy session, but has been sore with increased activity over the weekend.   Initial Pain Level::     3/10  Post Session Pain Level:       3/10  Medications Last Reviewed: 2025  Updated Objective Findings:   Findings consistent with initial evaluation unless noted otherwise.   Treatment   THERAPEUTIC EXERCISE: (See minutes below):    Exercises per grid below to improve mobility and strength.  Required minimal visual, verbal, and tactile cues to promote proper body alignment and promote proper body mechanics.  Progressed resistance and repetitions as indicated.  THERAPEUTIC ACTIVITY: ( See minutes below):    Therapeutic activities per grid below to improve mobility, strength,

## 2025-01-08 ENCOUNTER — HOSPITAL ENCOUNTER (OUTPATIENT)
Dept: PHYSICAL THERAPY | Age: 80
Setting detail: RECURRING SERIES
Discharge: HOME | End: 2025-01-11
Attending: INTERNAL MEDICINE
Payer: MEDICARE

## 2025-01-08 PROCEDURE — 97110 THERAPEUTIC EXERCISES: CPT

## 2025-01-08 ASSESSMENT — PAIN SCALES - GENERAL: PAINLEVEL_OUTOF10: 3

## 2025-01-08 NOTE — PROGRESS NOTES
Edelmira Jaquez  : 1945  Primary: [facility] Mmp Dual (Medicare Managed)  Secondary: MEDICAID [facility] [place] [address] [place] [address]  Phone: [phone]  Fax: [phone] Plan Frequency: up to 3x/week    Plan of Care/Certification Expiration Date: 25        Plan of Care/Certification Expiration Date:  Plan of Care/Certification Expiration Date: 25    Frequency/Duration:   Plan Frequency: up to 3x/week      Time In/Out:   Time In: 0213  Time Out: 0302      PT Visit Info:         Visit Count:  3    OUTPATIENT PHYSICAL THERAPY:   Treatment Note 2025       Episode  (RA/Neck pain)               Treatment Diagnosis:    Neck pain  Acute bilateral knee pain  Multiple sclerosis (HCC)  Decreased strength of lower extremity  Unsteadiness on feet  Medical/Referring Diagnosis:    Chronic neck pain    Referring Physician:  Cammy Maloney MD MD Orders:  PT Eval and Treat   Return MD Appt:  25   Date of Onset:  20  Allergies:   Statins  Restrictions/Precautions:   None      Interventions Planned (Treatment may consist of any combination of the following):     See Assessment Note    Subjective Comments:   Pt notes that she is not in much pain today to start her therapy session, but has some pain mostly in her ankles over the past couple days.  Initial Pain Level::     3/10  Post Session Pain Level:       3/10  Medications Last Reviewed: 2025  Updated Objective Findings:   Findings consistent with initial evaluation unless noted otherwise.   Treatment   THERAPEUTIC EXERCISE: (See minutes below):    Exercises per grid below to improve mobility and strength.  Required minimal visual, verbal, and tactile cues to promote proper body alignment and promote proper body mechanics.  Progressed resistance and repetitions as indicated.  THERAPEUTIC ACTIVITY: ( See minutes below):    Therapeutic activities per grid below to improve mobility,

## 2025-01-15 ENCOUNTER — APPOINTMENT (OUTPATIENT)
Dept: PHYSICAL THERAPY | Age: 80
End: 2025-01-15
Attending: INTERNAL MEDICINE
Payer: MEDICARE

## 2025-01-20 ENCOUNTER — APPOINTMENT (OUTPATIENT)
Dept: PHYSICAL THERAPY | Age: 80
End: 2025-01-20
Attending: INTERNAL MEDICINE
Payer: MEDICARE

## 2025-01-22 ENCOUNTER — HOSPITAL ENCOUNTER (OUTPATIENT)
Dept: PHYSICAL THERAPY | Age: 80
Setting detail: RECURRING SERIES
End: 2025-01-22
Attending: INTERNAL MEDICINE
Payer: MEDICARE

## 2025-01-27 ENCOUNTER — HOSPITAL ENCOUNTER (OUTPATIENT)
Dept: PHYSICAL THERAPY | Age: 80
Setting detail: RECURRING SERIES
End: 2025-01-27
Attending: INTERNAL MEDICINE
Payer: MEDICARE

## 2025-01-29 ENCOUNTER — HOSPITAL ENCOUNTER (OUTPATIENT)
Dept: PHYSICAL THERAPY | Age: 80
Setting detail: RECURRING SERIES
Discharge: HOME | End: 2025-02-01
Attending: INTERNAL MEDICINE
Payer: MEDICARE

## 2025-01-29 ENCOUNTER — TELEPHONE (OUTPATIENT)
Age: 80
End: 2025-01-29

## 2025-01-29 DIAGNOSIS — M05.79 RHEUMATOID ARTHRITIS INVOLVING MULTIPLE SITES WITH POSITIVE RHEUMATOID FACTOR (HCC): Primary | ICD-10-CM

## 2025-01-29 PROCEDURE — 97110 THERAPEUTIC EXERCISES: CPT

## 2025-01-29 RX ORDER — HYDROCODONE BITARTRATE AND ACETAMINOPHEN 7.5; 325 MG/1; MG/1
1 TABLET ORAL EVERY 6 HOURS PRN
Qty: 24 TABLET | Refills: 0 | Status: SHIPPED | OUTPATIENT
Start: 2025-01-29 | End: 2025-02-04

## 2025-01-29 ASSESSMENT — PAIN SCALES - GENERAL: PAINLEVEL_OUTOF10: 3

## 2025-01-29 NOTE — PROGRESS NOTES
Edelmira Jaquez  : 1945  Primary: [facility] Mmp Dual (Medicare Managed)  Secondary:  [facility] [place] [address] [place] [address]  Phone: [phone]  Fax: [phone] Plan Frequency: up to 3x/week    Plan of Care/Certification Expiration Date: 25        Plan of Care/Certification Expiration Date:  Plan of Care/Certification Expiration Date: 25    Frequency/Duration:   Plan Frequency: up to 3x/week      Time In/Out:   Time In: 0210  Time Out: 0309      PT Visit Info:         Visit Count:  4    OUTPATIENT PHYSICAL THERAPY:   Progress/Treatment Note 2025       Episode  (RA/Neck pain)               Treatment Diagnosis:    Neck pain  Acute bilateral knee pain  Multiple sclerosis (HCC)  Decreased strength of lower extremity  Unsteadiness on feet  Medical/Referring Diagnosis:    Chronic neck pain    Referring Physician:  Cammy Maloney MD MD Orders:  PT Eval and Treat   Return MD Appt:  25   Date of Onset:  20  Allergies:   Statins  Restrictions/Precautions:   None      Interventions Planned (Treatment may consist of any combination of the following):     See Assessment Note    Subjective Comments:   Pt notes that she is not in much pain today to start her therapy session, but has some pain mostly in her shoulders following cleaning her windows over the past couple days.  Initial Pain Level::     3/10  Post Session Pain Level:       3/10  Medications Last Reviewed: 2025  Updated Objective Findings:   Findings consistent with initial evaluation unless noted otherwise.   Update 25  ROM:   Cervical L rotation = 65 deg;  R rotation = 50 deg     MMT:   LE MMT  L knee extension 4/5; L hip flexion 4-/5  R hip flexion 4-/5  All else 4+/5     Special tests:   Single leg stance time:  L SLS = 3 second;  R SLS = 4 seconds     Timed up and go test = 12.8 seconds, 11.5 seconds, 10.4 seconds     Outcome Measure:   Tool Used: Neck Disability

## 2025-02-04 ENCOUNTER — OFFICE VISIT (OUTPATIENT)
Age: 80
End: 2025-02-04
Payer: MEDICARE

## 2025-02-04 ENCOUNTER — HOSPITAL ENCOUNTER (OUTPATIENT)
Dept: PHYSICAL THERAPY | Age: 80
Setting detail: RECURRING SERIES
Discharge: HOME | End: 2025-02-07
Attending: INTERNAL MEDICINE
Payer: MEDICARE

## 2025-02-04 DIAGNOSIS — M05.79 RHEUMATOID ARTHRITIS INVOLVING MULTIPLE SITES WITH POSITIVE RHEUMATOID FACTOR (HCC): ICD-10-CM

## 2025-02-04 PROCEDURE — 1159F MED LIST DOCD IN RCRD: CPT | Performed by: PHYSICIAN ASSISTANT

## 2025-02-04 PROCEDURE — 1123F ACP DISCUSS/DSCN MKR DOCD: CPT | Performed by: PHYSICIAN ASSISTANT

## 2025-02-04 PROCEDURE — 99214 OFFICE O/P EST MOD 30 MIN: CPT | Performed by: PHYSICIAN ASSISTANT

## 2025-02-04 PROCEDURE — 97110 THERAPEUTIC EXERCISES: CPT

## 2025-02-04 RX ORDER — HYDROCODONE BITARTRATE AND ACETAMINOPHEN 7.5; 325 MG/1; MG/1
1 TABLET ORAL EVERY 6 HOURS PRN
Qty: 120 TABLET | Refills: 0 | Status: SHIPPED | OUTPATIENT
Start: 2025-03-06 | End: 2025-04-05

## 2025-02-04 RX ORDER — HYDROCODONE BITARTRATE AND ACETAMINOPHEN 7.5; 325 MG/1; MG/1
1 TABLET ORAL EVERY 6 HOURS PRN
Qty: 120 TABLET | Refills: 0 | Status: SHIPPED | OUTPATIENT
Start: 2025-02-04 | End: 2025-03-06

## 2025-02-04 ASSESSMENT — ENCOUNTER SYMPTOMS
EYES NEGATIVE: 1
ABDOMINAL PAIN: 0
ALLERGIC/IMMUNOLOGIC NEGATIVE: 1
SHORTNESS OF BREATH: 0

## 2025-02-04 ASSESSMENT — PAIN SCALES - GENERAL: PAINLEVEL_OUTOF10: 2

## 2025-02-04 NOTE — PROGRESS NOTES
Ms Jaquez is a f/u after having missed her last appt. She is followed by our office for pain related to her rheumatoid arthritis. She has complained about neck pain and I ordered a cervical xray which she has not obtained. She is followed by Dr Maloney. She underwent DXA scan which showed significant osteoporosis with a 49% chance of a major osteoporotic fracture. Since I saw her last she was diagnosed with T-cell large granular lymphocytic leukemia. She was started on Methotrexate and Prednisone.   
             Previous interventions:  Procedure Date Results   Steroid injections Several throughout 2024  no change                                                      Previous medication trials: Listed:       Class Medication Results   NSAIDs Ibuprofen      Oral steroids  prednisone Moderate benefit   Tylenol       Antiepileptics       Antidepressants       Relaxants       Topicals/transdermaI patches Voltaren gel, lidocaine patch  no benefit, limited benefit   Narcotics Norco   mild benefit          Previous tests/studies:  Study Date Results   Bilateral hand xray 12/3/20  FINDINGS: Bilateral AP, Lateral, Oblique views are submitted. On both the left  and the right there is generalized osteopenia.  Bilaterally, there is no bone fracture or suspicious lesion.  In both the wrists and hands there is not marked joint space narrowing and I do  not identify significant periarticular erosions.  Bilaterally at the base of the thumb carpal metacarpal joints there is mild  increased DJD changes-symmetric appearing.  No focal findings in the soft tissues. There may be some soft tissue swelling  bilaterally at the index fingers and right fourth finger-this should be  correlated for clinically.                                                      Previous therapies:  Type of Therapy Date Results   Physical therapy ([facility]) 2021, 12 session  no benefit                                                      Opioid Risk Tool Score (low/mod/high}:     Opioid Risk Tool Female I Male   Family history of substance abuse     Alcohol 1 3   Illegal drugs 2 3   Prescription drugs 4 4   Personal history of substance abuse     Alcohol 3 3   Illegal drugs 4 4   Prescription drugs 5 5   Age between 16-45 years 1 1   History of preadolescent sexual abuse 3 0   Psychological disease     ADD, OCD, bipolar, schizophrenia 2 2   Depression 1 1   Total: 3 or less = low, 8 or higher= high        Past Medical History:   Diagnosis Date    Arthritis

## 2025-02-04 NOTE — PROGRESS NOTES
Edelmira Jaquez  : 1945  Primary: [facility] Mmp Dual (Medicare Managed)  Secondary:  [facility] @ [place] [address] [address] [place] [address]  Phone: [phone]  Fax: [phone] Plan Frequency: up to 3x/week    Plan of Care/Certification Expiration Date: 25        Plan of Care/Certification Expiration Date:  Plan of Care/Certification Expiration Date: 25    Frequency/Duration:   Plan Frequency: up to 3x/week      Time In/Out:   Time In: 0209  Time Out: 314      PT Visit Info:         Visit Count:  5    OUTPATIENT PHYSICAL THERAPY:   Progress/Treatment Note 2025       Episode  (RA/Neck pain)               Treatment Diagnosis:    Neck pain  Acute bilateral knee pain  Multiple sclerosis (HCC)  Decreased strength of lower extremity  Unsteadiness on feet  Medical/Referring Diagnosis:    Chronic neck pain    Referring Physician:  Cammy Maloney MD MD Orders:  PT Eval and Treat   Return MD Appt:  25   Date of Onset:  20  Allergies:   Statins  Restrictions/Precautions:   None      Interventions Planned (Treatment may consist of any combination of the following):     See Assessment Note    Subjective Comments:   Pt notes that she is not in much pain today. Pt states she had a visit with her pain management doctor today and had to be walking a good bit to navigate to<>from the parking lot.  Initial Pain Level::     2/10  Post Session Pain Level:       2/10  Medications Last Reviewed: 2025  Updated Objective Findings:   Findings consistent with initial evaluation unless noted otherwise.   Update 25  ROM:   Cervical L rotation = 65 deg;  R rotation = 50 deg     MMT:   LE MMT  L knee extension 4/5; L hip flexion 4-/5  R hip flexion 4-/5  All else 4+/5     Special tests:   Single leg stance time:  L SLS = 3 second;  R SLS = 4 seconds     Timed up and go test = 12.8 seconds, 11.5 seconds, 10.4 seconds     Outcome Measure:   Tool Used: Neck

## 2025-02-06 ENCOUNTER — HOSPITAL ENCOUNTER (OUTPATIENT)
Dept: PHYSICAL THERAPY | Age: 80
Setting detail: RECURRING SERIES
Discharge: HOME | End: 2025-02-09
Attending: INTERNAL MEDICINE
Payer: MEDICARE

## 2025-02-06 PROCEDURE — 97110 THERAPEUTIC EXERCISES: CPT

## 2025-02-06 ASSESSMENT — PAIN SCALES - GENERAL: PAINLEVEL_OUTOF10: 2

## 2025-02-06 NOTE — PROGRESS NOTES
Edelmira Jaquez  : 1945  Primary: [facility] Mmp Dual (Medicare Managed)  Secondary:  [facility] @ [place] [address] [address] [place] [address]  Phone: [phone]  Fax: [phone] Plan Frequency: up to 3x/week    Plan of Care/Certification Expiration Date: 25        Plan of Care/Certification Expiration Date:  Plan of Care/Certification Expiration Date: 25    Frequency/Duration:   Plan Frequency: up to 3x/week      Time In/Out:   Time In: 0210  Time Out: 0300      PT Visit Info:         Visit Count:  6    OUTPATIENT PHYSICAL THERAPY:   Progress/Treatment Note 2025       Episode  (RA/Neck pain)               Treatment Diagnosis:    Neck pain  Acute bilateral knee pain  Multiple sclerosis (HCC)  Decreased strength of lower extremity  Unsteadiness on feet  Medical/Referring Diagnosis:    Chronic neck pain    Referring Physician:  Cammy Maloney MD MD Orders:  PT Eval and Treat   Return MD Appt:  25   Date of Onset:  20  Allergies:   Statins  Restrictions/Precautions:   None      Interventions Planned (Treatment may consist of any combination of the following):     See Assessment Note    Subjective Comments:   Pt notes that she is not in much pain today due to her taking a pain pill prior to coming but is in some pain to start the session.   Initial Pain Level::     2/10  Post Session Pain Level:       2/10  Medications Last Reviewed: 2025  Updated Objective Findings:   Findings consistent with initial evaluation unless noted otherwise.   Update 25  ROM:   Cervical L rotation = 65 deg;  R rotation = 50 deg     MMT:   LE MMT  L knee extension 4/5; L hip flexion 4-/5  R hip flexion 4-/5  All else 4+/5     Special tests:   Single leg stance time:  L SLS = 3 second;  R SLS = 4 seconds     Timed up and go test = 12.8 seconds, 11.5 seconds, 10.4 seconds     Outcome Measure:   Tool Used: Neck Disability Index (NDI)  Score:  Initial:

## 2025-02-11 ENCOUNTER — HOSPITAL ENCOUNTER (OUTPATIENT)
Dept: PHYSICAL THERAPY | Age: 80
Setting detail: RECURRING SERIES
Discharge: HOME | End: 2025-02-14
Attending: INTERNAL MEDICINE
Payer: MEDICARE

## 2025-02-11 PROCEDURE — 97110 THERAPEUTIC EXERCISES: CPT

## 2025-02-11 ASSESSMENT — PAIN SCALES - GENERAL: PAINLEVEL_OUTOF10: 4

## 2025-02-11 NOTE — PROGRESS NOTES
Edelmira Jaquez  : 1945  Primary: [facility] Mmp Dual (Medicare Managed)  Secondary:  [facility] @ [place] [address] [address] [place] [address]  Phone: [phone]  Fax: [phone] Plan Frequency: up to 3x/week    Plan of Care/Certification Expiration Date: 25        Plan of Care/Certification Expiration Date:  Plan of Care/Certification Expiration Date: 25    Frequency/Duration:   Plan Frequency: up to 3x/week      Time In/Out:   Time In: 0214  Time Out: 0300      PT Visit Info:         Visit Count:  7    OUTPATIENT PHYSICAL THERAPY:   Progress/Treatment Note 2025       Episode  (RA/Neck pain)               Treatment Diagnosis:    Neck pain  Acute bilateral knee pain  Multiple sclerosis (HCC)  Decreased strength of lower extremity  Unsteadiness on feet  Medical/Referring Diagnosis:    Chronic neck pain    Referring Physician:  Cammy Maloney MD MD Orders:  PT Eval and Treat   Return MD Appt:  25   Date of Onset:  20  Allergies:   Statins  Restrictions/Precautions:   None      Interventions Planned (Treatment may consist of any combination of the following):     See Assessment Note    Subjective Comments:   Pt notes that she is in some pain today and she did take a pain pill prior to coming to the session.   Initial Pain Level::     4/10  Post Session Pain Level:       4/10  Medications Last Reviewed: 2025  Updated Objective Findings:   Findings consistent with initial evaluation unless noted otherwise.   Update 25  ROM:   Cervical L rotation = 65 deg;  R rotation = 50 deg     MMT:   LE MMT  L knee extension 4/5; L hip flexion 4-/5  R hip flexion 4-/5  All else 4+/5     Special tests:   Single leg stance time:  L SLS = 3 second;  R SLS = 4 seconds     Timed up and go test = 12.8 seconds, 11.5 seconds, 10.4 seconds     Outcome Measure:   Tool Used: Neck Disability Index (NDI)  Score:  Initial:  (25) Most Recent:  (Date:

## 2025-02-13 ENCOUNTER — APPOINTMENT (OUTPATIENT)
Dept: PHYSICAL THERAPY | Age: 80
End: 2025-02-13
Attending: INTERNAL MEDICINE
Payer: MEDICARE

## 2025-02-18 ENCOUNTER — HOSPITAL ENCOUNTER (OUTPATIENT)
Dept: PHYSICAL THERAPY | Age: 80
Setting detail: RECURRING SERIES
Discharge: HOME | End: 2025-02-21
Attending: INTERNAL MEDICINE
Payer: MEDICARE

## 2025-02-18 PROCEDURE — 97140 MANUAL THERAPY 1/> REGIONS: CPT

## 2025-02-18 PROCEDURE — 97110 THERAPEUTIC EXERCISES: CPT

## 2025-02-18 ASSESSMENT — PAIN SCALES - GENERAL: PAINLEVEL_OUTOF10: 0

## 2025-02-18 NOTE — PROGRESS NOTES
Edelmira Jaquez  : 1945  Primary: [facility] Mmp Dual (Medicare Managed)  Secondary:  [facility] @ [place] [address] [address] [place] [address]  Phone: [phone]  Fax: [phone] Plan Frequency: up to 3x/week    Plan of Care/Certification Expiration Date: 25        Plan of Care/Certification Expiration Date:  Plan of Care/Certification Expiration Date: 25    Frequency/Duration:   Plan Frequency: up to 3x/week      Time In/Out:   Time In: 214  Time Out: 315      PT Visit Info:         Visit Count:  8    OUTPATIENT PHYSICAL THERAPY:   Progress/Treatment Note 2025       Episode  (RA/Neck pain)               Treatment Diagnosis:    Neck pain  Acute bilateral knee pain  Multiple sclerosis (HCC)  Decreased strength of lower extremity  Unsteadiness on feet  Medical/Referring Diagnosis:    Chronic neck pain    Referring Physician:  Cammy Maloney MD MD Orders:  PT Eval and Treat   Return MD Appt:  25   Date of Onset:  20  Allergies:   Statins  Restrictions/Precautions:   None      Interventions Planned (Treatment may consist of any combination of the following):     See Assessment Note    Subjective Comments:   Pt notes that she is not in any pain today due to being in the therapeutic window of a pain pill she took prior to coming today. She notes that she believes she is having a flare up with her arthritis and needed medication to come to her appointment.   Initial Pain Level::     0/10  Post Session Pain Level:       0/10  Medications Last Reviewed: 2025  Updated Objective Findings:   Findings consistent with initial evaluation unless noted otherwise.   Update 25  ROM:   Cervical L rotation = 65 deg;  R rotation = 50 deg     MMT:   LE MMT  L knee extension 4/5; L hip flexion 4-/5  R hip flexion 4-/5  All else 4+/5     Special tests:   Single leg stance time:  L SLS = 3 second;  R SLS = 4 seconds     Timed up and go test = 12.8

## 2025-02-20 ENCOUNTER — HOSPITAL ENCOUNTER (OUTPATIENT)
Dept: PHYSICAL THERAPY | Age: 80
Setting detail: RECURRING SERIES
Discharge: HOME | End: 2025-02-23
Attending: INTERNAL MEDICINE
Payer: MEDICARE

## 2025-02-20 PROCEDURE — 97110 THERAPEUTIC EXERCISES: CPT

## 2025-02-20 PROCEDURE — 97140 MANUAL THERAPY 1/> REGIONS: CPT

## 2025-02-20 NOTE — PROGRESS NOTES
Edelmira Jaquez  : 1945  Primary: [facility] Mmp Dual (Medicare Managed)  Secondary:  [facility] @ [place] [address] [place] [place] [address]  Phone: [phone]  Fax: [phone] Plan Frequency: up to 3x/week    Plan of Care/Certification Expiration Date: 25        Plan of Care/Certification Expiration Date:  Plan of Care/Certification Expiration Date: 25    Frequency/Duration:   Plan Frequency: up to 3x/week      Time In/Out:   Time In: 0215  Time Out: 0300      PT Visit Info:         Visit Count:  9    OUTPATIENT PHYSICAL THERAPY:   Progress/Treatment Note 2025       Episode  (RA/Neck pain)               Treatment Diagnosis:    Neck pain  Acute bilateral knee pain  Multiple sclerosis (HCC)  Decreased strength of lower extremity  Unsteadiness on feet  Medical/Referring Diagnosis:    Chronic neck pain    Referring Physician:  Cammy Maloney MD MD Orders:  PT Eval and Treat   Return MD Appt:  25   Date of Onset:  20  Allergies:   Statins  Restrictions/Precautions:   None      Interventions Planned (Treatment may consist of any combination of the following):     See Assessment Note    Subjective Comments:   Pt notes that she is in increased pain today with reported RA flare up. Pt states that she did not take a pain pill prior to coming to this appointment and will determine if it is necessary after the appointment.    Initial Pain Level::     7/10  Post Session Pain Level:       7/10  Medications Last Reviewed: 2025  Updated Objective Findings:   Findings consistent with initial evaluation unless noted otherwise.   Update 25  ROM:   Cervical L rotation = 65 deg;  R rotation = 50 deg     MMT:   LE MMT  L knee extension 4/5; L hip flexion 4-/5  R hip flexion 4-/5  All else 4+/5     Special tests:   Single leg stance time:  L SLS = 3 second;  R SLS = 4 seconds     Timed up and go test = 12.8 seconds, 11.5 seconds, 10.4 seconds

## 2025-02-21 ASSESSMENT — PAIN SCALES - GENERAL: PAINLEVEL_OUTOF10: 7

## 2025-02-25 ENCOUNTER — HOSPITAL ENCOUNTER (OUTPATIENT)
Dept: PHYSICAL THERAPY | Age: 80
Setting detail: RECURRING SERIES
Discharge: HOME | End: 2025-02-28
Attending: INTERNAL MEDICINE
Payer: MEDICARE

## 2025-02-25 PROCEDURE — 97140 MANUAL THERAPY 1/> REGIONS: CPT

## 2025-02-25 PROCEDURE — 97110 THERAPEUTIC EXERCISES: CPT

## 2025-02-25 ASSESSMENT — PAIN SCALES - GENERAL: PAINLEVEL_OUTOF10: 6

## 2025-02-25 NOTE — PROGRESS NOTES
Edelmira Jaquez  : 1945  Primary: [facility] Mmp Dual (Medicare Managed)  Secondary:  [facility] @ [place] [address] [address] [place] [address]  Phone: [phone]  Fax: [phone] Plan Frequency: up to 3x/week    Plan of Care/Certification Expiration Date: 25        Plan of Care/Certification Expiration Date:  Plan of Care/Certification Expiration Date: 25    Frequency/Duration:   Plan Frequency: up to 3x/week      Time In/Out:   Time In: 215  Time Out: 312      PT Visit Info:         Visit Count:  10    OUTPATIENT PHYSICAL THERAPY:   Progress/Treatment Note 2025       Episode  (RA/Neck pain)               Treatment Diagnosis:    Neck pain  Acute bilateral knee pain  Multiple sclerosis (HCC)  Decreased strength of lower extremity  Unsteadiness on feet  Medical/Referring Diagnosis:    Chronic neck pain    Referring Physician:  Cammy Maloney MD MD Orders:  PT Eval and Treat   Return MD Appt:  25   Date of Onset:  20  Allergies:   Statins  Restrictions/Precautions:   None      Interventions Planned (Treatment may consist of any combination of the following):     See Assessment Note    Subjective Comments:   Pt notes that she is in pain today in her cervical spine and shoulders. Pt states that she did not take a pain pill prior to coming to this appointment.    Initial Pain Level::     6/10  Post Session Pain Level:       5/10  Medications Last Reviewed: 2025  Updated Objective Findings:   Findings consistent with initial evaluation unless noted otherwise.   Update 25  ROM:   Cervical L rotation = 65 deg;  R rotation = 50 deg     MMT:   LE MMT  L knee extension 4/5; L hip flexion 4-/5  R hip flexion 4-/5  All else 4+/5     Special tests:   Single leg stance time:  L SLS = 3 second;  R SLS = 4 seconds     Timed up and go test = 12.8 seconds, 11.5 seconds, 10.4 seconds     Outcome Measure:   Tool Used: Neck Disability Index

## 2025-02-27 ENCOUNTER — HOSPITAL ENCOUNTER (OUTPATIENT)
Dept: PHYSICAL THERAPY | Age: 80
Setting detail: RECURRING SERIES
End: 2025-02-27
Attending: INTERNAL MEDICINE
Payer: MEDICARE

## 2025-02-27 PROCEDURE — 97110 THERAPEUTIC EXERCISES: CPT

## 2025-02-27 ASSESSMENT — PAIN SCALES - GENERAL: PAINLEVEL_OUTOF10: 2

## 2025-02-28 NOTE — PROGRESS NOTES
I am accessing Ms. Jaquez's chart as a part of our department's internal chart auditing process.  I certify that Ms. Jaquez is, or was, a patient in our department.  Thank you,  Saul Samuel, PT  2/28/2025   
sets - 10 reps  - Seated Long Arc Quad  - 1 x daily - 7 x weekly - 3 sets - 10 reps  - Standing Tricep Extensions with Resistance  - 1 x daily - 7 x weekly - 3 sets - 10 reps  - Shoulder Extension with Resistance  - 1 x daily - 7 x weekly - 3 sets - 10 reps  - Standing Bilateral Low Shoulder Row with Anchored Resistance  - 1 x daily - 7 x weekly - 3 sets - 10 reps    Treatment/Session Summary:    Treatment Assessment:   Pt completed therapeutic exercises with good tolerance to all exercises challenging hip complex in strength and endurance. Pt noted some increased fatigue with increased resistance and repetitions today in functional exercises such as sit <> stands. Pt continues to have decreased steadiness on her feet seen in step ups with airex today.   Communication/Consultation:  None today  Equipment provided today:  None  Recommendations/Intent for next treatment session: Next visit will focus on modification of therapeutic exercises to pt tolerance.    >Total Treatment Billable Duration:  44 minutes   Time In: 0215  Time Out: 0300    JORDAN TONEY, PT         Charge Capture  Events  [facility] [address] Portal  Appt Desk  Attendance Report     Future Appointments   Date Time Provider Department Center   3/4/2025  2:15 PM Jordan Toney, PT [facility]   3/6/2025  2:15 PM Jordan Toney, PT [facility]   3/11/2025  2:15 PM Jordan Toney, PT [facility]   3/13/2025  2:15 PM Jordan Toney, PT [facility]   3/18/2025  2:15 PM Jordan Toney, PT [facility]   3/20/2025  2:15 PM Jordan Toney, PT [facility]   3/25/2025  2:15 PM Jordan Toney, PT [facility]   3/27/2025  2:15 PM Jordan Toney, PT [facility]   3/31/2025  1:10 PM [facility]   4/1/2025  1:45 PM Job Hinds MD [facility] [place] [facility]   4/2/2025  1:00 PM Bisi Martienz, PA [facility] [place]   4/3/2025 10:00 AM Zayra Tobar, APRN - NP [facility] [place] [facility]

## 2025-03-04 ENCOUNTER — APPOINTMENT (OUTPATIENT)
Dept: PHYSICAL THERAPY | Age: 80
End: 2025-03-04
Attending: INTERNAL MEDICINE
Payer: MEDICARE

## 2025-03-04 NOTE — TELEPHONE ENCOUNTER
Pt last seen with Haider in December and scheduled with Carlos Enrique at the end of July. She is having a bad flare right now and her hands are stiff to the point she is not able to move them. Pt scheduled for follow up on 7/28/2025

## 2025-03-04 NOTE — TELEPHONE ENCOUNTER
Haider pt,  OV 12/11/24, see pt message in original message below from , thanks    ASSESSMENT AND PLAN:  Edelmira Jaquez is a 79 y.o. female that is here for evaluation of RA.  her problems include:     Edelmira was seen today for follow-up.     Diagnoses and all orders for this visit:     Postmenopausal osteoporosis  History of ankle fracture     History of ovarian cancer     Early menopause occurring in patient age younger than 45 years     Reviewed DEXA from 11-7-2024: Patient would be a good candidate for Prolia.  No planned dental procedures-recommend scheduling routine cleaning.  Risk factors include ovarian cancer status post hysterectomy less than 45, low trauma ankle fracture.  Consider Prolia previously used x 2 doses ending in 2022.    -Changed to calcium citrate 600 Mg twice daily.  Encourage dietary sources of calcium  - Continue vitamin D 2000 units daily  - Prolia given today  LGL     Seropositive rheumatoid arthritis (HCC)     Encounter for other specified special examinations     Current chronic use of systemic steroids     Started on methotrexate 20 Mg weekly, prednisone 60 Mg to take x 1 month with taper in October 2024 with excellent response.  Taper down to prednisone 20 Mg yesterday without major problems.  Patient is now at low disease activity on therapy.  - Continue prednisone taper per oncology  - Continue methotrexate 20 Mg weekly plus daily folic acid  - Encouraged vaccines: Flu, COVID, Shingrix, Prevnar 20 which she will consider     Generalized weakness: PT referral placed: Encouraged keeping appointments with neurology as MS could impact this     Neck pain: Suggested topical therapies OTC which she will explore     Skin lesion: Does not appear consistent with petechia.  Patient will monitor for spread     Follow-up in 1 month as planned

## 2025-03-06 ENCOUNTER — APPOINTMENT (OUTPATIENT)
Dept: PHYSICAL THERAPY | Age: 80
End: 2025-03-06
Attending: INTERNAL MEDICINE
Payer: MEDICARE

## 2025-03-10 RX ORDER — PREDNISONE 5 MG/1
TABLET ORAL
Qty: 18 TABLET | Refills: 0 | OUTPATIENT
Start: 2025-03-10

## 2025-03-11 ENCOUNTER — HOSPITAL ENCOUNTER (OUTPATIENT)
Dept: PHYSICAL THERAPY | Age: 80
Setting detail: RECURRING SERIES
Discharge: HOME | End: 2025-03-14
Attending: INTERNAL MEDICINE
Payer: MEDICARE

## 2025-03-11 PROCEDURE — 97110 THERAPEUTIC EXERCISES: CPT

## 2025-03-11 ASSESSMENT — PAIN SCALES - GENERAL: PAINLEVEL_OUTOF10: 4

## 2025-03-11 NOTE — PROGRESS NOTES
Edelmira Jaquez  : 1945  Primary: [facility] Mmp Dual (Medicare Managed)  Secondary:  [facility] @ [place] [address] [address] [place] [address]  Phone: [phone]  Fax: [phone] Plan Frequency: up to 3x/week    Plan of Care/Certification Expiration Date: 25        Plan of Care/Certification Expiration Date:  Plan of Care/Certification Expiration Date: 25    Frequency/Duration:   Plan Frequency: up to 3x/week      Time In/Out:   Time In: 0213  Time Out: 0258      PT Visit Info:         Visit Count:  12    OUTPATIENT PHYSICAL THERAPY:   Progress/Treatment Note 3/11/2025       Episode  (RA/Neck pain)               Treatment Diagnosis:    Neck pain  Acute bilateral knee pain  Multiple sclerosis (HCC)  Decreased strength of lower extremity  Unsteadiness on feet  Medical/Referring Diagnosis:    Chronic neck pain    Referring Physician:  Cammy Maloney MD MD Orders:  PT Eval and Treat   Return MD Appt:  25   Date of Onset:  20  Allergies:   Statins  Restrictions/Precautions:   None      Interventions Planned (Treatment may consist of any combination of the following):     See Assessment Note    Subjective Comments:   Pt notes that she is in some increased pain today to start therapy and notes that she has began taking low dose prednisone which has helped decrease her pain.     Initial Pain Level::     4/10  Post Session Pain Level:       4/10  Medications Last Reviewed: 3/11/2025  Updated Objective Findings:   Findings consistent with initial evaluation unless noted otherwise.   Update 25  ROM:   Cervical L rotation = 65 deg;  R rotation = 50 deg     MMT:   LE MMT  L knee extension 4/5; L hip flexion 4-/5  R hip flexion 4-/5  All else 4+/5     Special tests:   Single leg stance time:  L SLS = 3 second;  R SLS = 4 seconds     Timed up and go test = 12.8 seconds, 11.5 seconds, 10.4 seconds     Outcome Measure:   Tool Used: Neck Disability Index

## 2025-03-13 ENCOUNTER — HOSPITAL ENCOUNTER (OUTPATIENT)
Dept: PHYSICAL THERAPY | Age: 80
Setting detail: RECURRING SERIES
Discharge: HOME | End: 2025-03-16
Attending: INTERNAL MEDICINE
Payer: MEDICARE

## 2025-03-13 PROCEDURE — 97110 THERAPEUTIC EXERCISES: CPT

## 2025-03-13 ASSESSMENT — PAIN SCALES - GENERAL: PAINLEVEL_OUTOF10: 0

## 2025-03-13 NOTE — PROGRESS NOTES
for next treatment session: Next visit will focus on modification of therapeutic exercises to pt tolerance.    >Total Treatment Billable Duration:  40 minutes   Time In: 0213  Time Out: 0255    JORDAN TONEY, PT         Charge Capture  Events  [facility] Portal  Appt Desk  Attendance Report     Future Appointments   Date Time Provider Department Center   3/18/2025  2:15 PM Jordan Toney, PT [facility]   3/20/2025  2:15 PM Jordan Toney, PT [facility]   3/25/2025  2:15 PM Jordan Toney, PT [facility]   3/27/2025  2:15 PM Jordan Toney, PT [facility]   3/31/2025  1:10 PM [facility]   4/1/2025  1:45 PM Job Hinds MD [facility] [place] [facility]   4/2/2025  1:00 PM Bisi Martinez, PA PM [facility]   4/3/2025 10:00 AM Zayra Tobar APRN - NP [facility] [place] [facility]   7/28/2025 10:00 AM Patric Monaco DO [facility] [place] [facility]

## 2025-03-18 ENCOUNTER — HOSPITAL ENCOUNTER (OUTPATIENT)
Dept: PHYSICAL THERAPY | Age: 80
Setting detail: RECURRING SERIES
Discharge: HOME | End: 2025-03-21
Attending: INTERNAL MEDICINE
Payer: MEDICARE

## 2025-03-18 PROCEDURE — 97110 THERAPEUTIC EXERCISES: CPT

## 2025-03-18 ASSESSMENT — PAIN SCALES - GENERAL: PAINLEVEL_OUTOF10: 7

## 2025-03-18 NOTE — PROGRESS NOTES
Edelmira Jaquez  : 1945  Primary: [facility] Mmp Dual (Medicare Managed)  Secondary:  [facility] @ [place]  [address] [place] [address]  Phone: [phone]  Fax: [phone] Plan Frequency: up to 3x/week    Plan of Care/Certification Expiration Date: 25        Plan of Care/Certification Expiration Date:  Plan of Care/Certification Expiration Date: 25    Frequency/Duration:   Plan Frequency: up to 3x/week      Time In/Out:   Time In: 021  Time Out: 025      PT Visit Info:         Visit Count:  14    OUTPATIENT PHYSICAL THERAPY:   Treatment Note 3/18/2025       Episode  (RA/Neck pain)               Treatment Diagnosis:    Neck pain  Acute bilateral knee pain  Multiple sclerosis (HCC)  Decreased strength of lower extremity  Unsteadiness on feet  Medical/Referring Diagnosis:    Chronic neck pain    Referring Physician:  Cammy Maloney MD MD Orders:  PT Eval and Treat   Return MD Appt:  25   Date of Onset:  20  Allergies:   Statins  Restrictions/Precautions:   None      Interventions Planned (Treatment may consist of any combination of the following):     See Assessment Note    Subjective Comments:   Pt notes that she is in some pain starting therapy noting that her L hand is very stiff and she cannot move it well.   Initial Pain Level::     7/10  Post Session Pain Level:       7/10  Medications Last Reviewed: 3/18/2025  Updated Objective Findings:   Findings consistent with initial evaluation unless noted otherwise.   Update 3/13/25:  ROM:   Cervical L rotation = 65 deg;  R rotation = 50 deg     MMT:   0-5 scale Left _/5 Right _/5   Hip flexion 4-/5 4-/5   Hip extension 4-/5 4-/5   Hip abduction 5/5 5/5   Hip adduciton 5/5 5/5   Knee flexion 4/5 4+/5   Knee extension 4/5 4+/5   Ankle dorsiflexion 4+/5 4+/5   Ankle plantarflexion 4+/5 4+/5        Special tests:   Single leg stance time:  L SLS = 5.1 second;  R SLS = 5.2 seconds     Timed up and

## 2025-03-20 ENCOUNTER — HOSPITAL ENCOUNTER (OUTPATIENT)
Dept: PHYSICAL THERAPY | Age: 80
Setting detail: RECURRING SERIES
Discharge: HOME | End: 2025-03-23
Attending: INTERNAL MEDICINE
Payer: MEDICARE

## 2025-03-20 PROCEDURE — 97110 THERAPEUTIC EXERCISES: CPT

## 2025-03-20 NOTE — PROGRESS NOTES
Edelmira Jaquez  : 1945  Primary: [facility] Mmp Dual (Medicare Managed)  Secondary:  [facility] @ [place]  [place] [address]  Phone: [phone]  Fax: [phone] Plan Frequency: up to 3x/week    Plan of Care/Certification Expiration Date: 25        Plan of Care/Certification Expiration Date:  Plan of Care/Certification Expiration Date: 25    Frequency/Duration:   Plan Frequency: up to 3x/week      Time In/Out:   Time In: 0214  Time Out: 0258      PT Visit Info:         Visit Count:  15    OUTPATIENT PHYSICAL THERAPY:   Treatment Note 3/20/2025       Episode  (RA/Neck pain)               Treatment Diagnosis:    Neck pain  Acute bilateral knee pain  Multiple sclerosis (HCC)  Decreased strength of lower extremity  Unsteadiness on feet  Medical/Referring Diagnosis:    Chronic neck pain    Referring Physician:  Cammy Maloney MD MD Orders:  PT Eval and Treat   Return MD Appt:  25   Date of Onset:  20  Allergies:   Statins  Restrictions/Precautions:   None      Interventions Planned (Treatment may consist of any combination of the following):     See Assessment Note    Subjective Comments:   Pt notes that she is not experiencing pain at the moment, but she did take a pain pill recently today.   Initial Pain Level::     0/10  Post Session Pain Level:       0/10  Medications Last Reviewed: 3/20/2025  Updated Objective Findings:   Findings consistent with initial evaluation unless noted otherwise.   Update 3/13/25:  ROM:   Cervical L rotation = 65 deg;  R rotation = 50 deg     MMT:   0-5 scale Left _/5 Right _/5   Hip flexion 4-/5 4-/5   Hip extension 4-/5 4-/5   Hip abduction 5/5 5/5   Hip adduciton 5/5 5/5   Knee flexion 4/5 4+/5   Knee extension 4/5 4+/5   Ankle dorsiflexion 4+/5 4+/5   Ankle plantarflexion 4+/5 4+/5        Special tests:   Single leg stance time:  L SLS = 5.1 second;  R SLS = 5.2 seconds     Timed up and go test =

## 2025-03-21 ASSESSMENT — PAIN SCALES - GENERAL: PAINLEVEL_OUTOF10: 0

## 2025-03-25 ENCOUNTER — HOSPITAL ENCOUNTER (OUTPATIENT)
Dept: PHYSICAL THERAPY | Age: 80
Setting detail: RECURRING SERIES
Discharge: HOME | End: 2025-03-28
Attending: INTERNAL MEDICINE
Payer: MEDICARE

## 2025-03-25 PROCEDURE — 97110 THERAPEUTIC EXERCISES: CPT

## 2025-03-25 ASSESSMENT — PAIN SCALES - GENERAL: PAINLEVEL_OUTOF10: 7

## 2025-03-25 NOTE — PROGRESS NOTES
Edelmira Jaquez  : 1945  Primary: [facility] Mmp Dual (Medicare Managed)  Secondary:  [facility] [place] @ [place]  [place] [address]  Phone: [phone]  Fax: [phone] Plan Frequency: up to 3x/week    Plan of Care/Certification Expiration Date: 25        Plan of Care/Certification Expiration Date:  Plan of Care/Certification Expiration Date: 25    Frequency/Duration:   Plan Frequency: up to 3x/week      Time In/Out:   Time In: 0215  Time Out: 0309      PT Visit Info:         Visit Count:  16    OUTPATIENT PHYSICAL THERAPY:   Treatment Note 3/25/2025       Episode  (RA/Neck pain)               Treatment Diagnosis:    Neck pain  Acute bilateral knee pain  Multiple sclerosis (HCC)  Decreased strength of lower extremity  Unsteadiness on feet  Medical/Referring Diagnosis:    Chronic neck pain    Referring Physician:  Cammy Maloney MD MD Orders:  PT Eval and Treat   Return MD Appt:  25   Date of Onset:  20  Allergies:   Statins  Restrictions/Precautions:   None      Interventions Planned (Treatment may consist of any combination of the following):     See Assessment Note    Subjective Comments:   Pt notes that she is in some increased pain today noting that her L knee and L shoulder are bothering her.   Initial Pain Level::     7/10  Post Session Pain Level:       5/10  Medications Last Reviewed: 3/25/2025  Updated Objective Findings:   Findings consistent with initial evaluation unless noted otherwise.   Update 3/13/25:  ROM:   Cervical L rotation = 65 deg;  R rotation = 50 deg     MMT:   0-5 scale Left _/5 Right _/5   Hip flexion 4-/5 4-/5   Hip extension 4-/5 4-/5   Hip abduction 5/5 5/5   Hip adduciton 5/5 5/5   Knee flexion 4/5 4+/5   Knee extension 4/5 4+/5   Ankle dorsiflexion 4+/5 4+/5   Ankle plantarflexion 4+/5 4+/5        Special tests:   Single leg stance time:  L SLS = 5.1 second;  R SLS = 5.2 seconds     Timed up and go test =

## 2025-03-27 ENCOUNTER — HOSPITAL ENCOUNTER (OUTPATIENT)
Dept: PHYSICAL THERAPY | Age: 80
Setting detail: RECURRING SERIES
Discharge: HOME | End: 2025-03-30
Attending: INTERNAL MEDICINE
Payer: MEDICARE

## 2025-03-27 PROCEDURE — 97110 THERAPEUTIC EXERCISES: CPT

## 2025-03-27 ASSESSMENT — PAIN SCALES - GENERAL: PAINLEVEL_OUTOF10: 3

## 2025-03-27 NOTE — PROGRESS NOTES
Edelmira Jaquez  : 1945  Primary: [facility] Mmp Dual (Medicare Managed)  Secondary:  [facility] @ [place] [address] [place] [address]  Phone: [phone]  Fax: [phone] Plan Frequency: up to 3x/week    Plan of Care/Certification Expiration Date: 25        Plan of Care/Certification Expiration Date:  Plan of Care/Certification Expiration Date: 25    Frequency/Duration:   Plan Frequency: up to 3x/week      Time In/Out:   Time In: 0210  Time Out: 0259      PT Visit Info:         Visit Count:  17    OUTPATIENT PHYSICAL THERAPY:   Progress/Treatment Note 3/27/2025       Episode  (RA/Neck pain)               Treatment Diagnosis:    Neck pain  Acute bilateral knee pain  Multiple sclerosis (HCC)  Decreased strength of lower extremity  Unsteadiness on feet  Medical/Referring Diagnosis:    Chronic neck pain    Referring Physician:  Cammy Maloney MD MD Orders:  PT Eval and Treat   Return MD Appt:  25   Date of Onset:  20  Allergies:   Statins  Restrictions/Precautions:   None      Interventions Planned (Treatment may consist of any combination of the following):     See Assessment Note    Subjective Comments:   Pt notes that she is having some pain today in her shoulders and knees, but that she is feeling fairly well today.   Initial Pain Level::     3/10  Post Session Pain Level:       2/10  Medications Last Reviewed: 3/27/2025  Updated Objective Findings:   Findings consistent with initial evaluation unless noted otherwise.   Update 3/13/25:  ROM:   Cervical L rotation = 65 deg;  R rotation = 50 deg     MMT:   0-5 scale Left _/5 Right _/5   Hip flexion 4-/5 4-/5   Hip extension 4-/5 4-/5   Hip abduction 5/5 5/5   Hip adduciton 5/5 5/5   Knee flexion 4/5 4+/5   Knee extension 4/5 4+/5   Ankle dorsiflexion 4+/5 4+/5   Ankle plantarflexion 4+/5 4+/5        Special tests:   Single leg stance time:  L SLS = 5.1 second;  R SLS = 5.2 seconds     Timed

## 2025-04-01 ENCOUNTER — OFFICE VISIT (OUTPATIENT)
Dept: ONCOLOGY | Age: 80
End: 2025-04-01
Payer: MEDICARE

## 2025-04-01 ENCOUNTER — APPOINTMENT (OUTPATIENT)
Dept: PHYSICAL THERAPY | Age: 80
End: 2025-04-01
Attending: INTERNAL MEDICINE
Payer: MEDICARE

## 2025-04-01 ENCOUNTER — HOSPITAL ENCOUNTER (OUTPATIENT)
Dept: LAB | Age: 80
Discharge: HOME | End: 2025-04-01
Payer: MEDICARE

## 2025-04-01 VITALS
BODY MASS INDEX: 30.92 KG/M2 | RESPIRATION RATE: 18 BRPM | HEART RATE: 72 BPM | SYSTOLIC BLOOD PRESSURE: 144 MMHG | TEMPERATURE: 97.4 F | WEIGHT: 174.5 LBS | OXYGEN SATURATION: 94 % | DIASTOLIC BLOOD PRESSURE: 76 MMHG | HEIGHT: 63 IN

## 2025-04-01 DIAGNOSIS — Z51.11 ENCOUNTER FOR ANTINEOPLASTIC CHEMOTHERAPY: ICD-10-CM

## 2025-04-01 DIAGNOSIS — C91.Z0 T-CELL LARGE GRANULAR LYMPHOCYTIC LEUKEMIA (HCC): Primary | ICD-10-CM

## 2025-04-01 DIAGNOSIS — T45.1X5A CHEMOTHERAPY-INDUCED NEUTROPENIA: ICD-10-CM

## 2025-04-01 DIAGNOSIS — D70.1 CHEMOTHERAPY-INDUCED NEUTROPENIA: ICD-10-CM

## 2025-04-01 DIAGNOSIS — C91.Z0 T-CELL LARGE GRANULAR LYMPHOCYTIC LEUKEMIA (HCC): ICD-10-CM

## 2025-04-01 DIAGNOSIS — Z79.899 HIGH RISK MEDICATION USE: ICD-10-CM

## 2025-04-01 LAB
ALBUMIN SERPL-MCNC: 3.4 G/DL (ref 3.2–4.6)
ALBUMIN/GLOB SERPL: 0.9 (ref 1–1.9)
ALP SERPL-CCNC: 65 U/L (ref 35–104)
ALT SERPL-CCNC: 18 U/L (ref 8–45)
ANION GAP SERPL CALC-SCNC: 11 MMOL/L (ref 7–16)
AST SERPL-CCNC: 22 U/L (ref 15–37)
BASOPHILS # BLD: 0.03 K/UL (ref 0–0.2)
BASOPHILS NFR BLD: 1 % (ref 0–2)
BILIRUB SERPL-MCNC: 0.3 MG/DL (ref 0–1.2)
BUN SERPL-MCNC: 16 MG/DL (ref 8–23)
CALCIUM SERPL-MCNC: 9 MG/DL (ref 8.8–10.2)
CHLORIDE SERPL-SCNC: 105 MMOL/L (ref 98–107)
CO2 SERPL-SCNC: 25 MMOL/L (ref 20–29)
CREAT SERPL-MCNC: 0.64 MG/DL (ref 0.6–1.1)
DIFFERENTIAL METHOD BLD: ABNORMAL
EOSINOPHIL # BLD: 0.08 K/UL (ref 0–0.8)
EOSINOPHIL NFR BLD: 2.7 % (ref 0.5–7.8)
ERYTHROCYTE [DISTWIDTH] IN BLOOD BY AUTOMATED COUNT: 14.2 % (ref 11.9–14.6)
GLOBULIN SER CALC-MCNC: 3.9 G/DL (ref 2.3–3.5)
GLUCOSE SERPL-MCNC: 111 MG/DL (ref 70–99)
HCT VFR BLD AUTO: 40.7 % (ref 35.8–46.3)
HGB BLD-MCNC: 13 G/DL (ref 11.7–15.4)
IMM GRANULOCYTES # BLD AUTO: 0.01 K/UL (ref 0–0.5)
IMM GRANULOCYTES NFR BLD AUTO: 0.3 % (ref 0–5)
LYMPHOCYTES # BLD: 2.06 K/UL (ref 0.5–4.6)
LYMPHOCYTES NFR BLD: 69.8 % (ref 13–44)
MAGNESIUM SERPL-MCNC: 2.2 MG/DL (ref 1.8–2.4)
MCH RBC QN AUTO: 30.2 PG (ref 26.1–32.9)
MCHC RBC AUTO-ENTMCNC: 31.9 G/DL (ref 31.4–35)
MCV RBC AUTO: 94.7 FL (ref 82–102)
MONOCYTES # BLD: 0.37 K/UL (ref 0.1–1.3)
MONOCYTES NFR BLD: 12.5 % (ref 4–12)
NEUTS SEG # BLD: 0.4 K/UL (ref 1.7–8.2)
NEUTS SEG NFR BLD: 13.7 % (ref 43–78)
NRBC # BLD: 0 K/UL (ref 0–0.2)
PLATELET # BLD AUTO: 176 K/UL (ref 150–450)
PMV BLD AUTO: 9.8 FL (ref 9.4–12.3)
POTASSIUM SERPL-SCNC: 4.4 MMOL/L (ref 3.5–5.1)
PROT SERPL-MCNC: 7.3 G/DL (ref 6.3–8.2)
RBC # BLD AUTO: 4.3 M/UL (ref 4.05–5.2)
SODIUM SERPL-SCNC: 141 MMOL/L (ref 136–145)
WBC # BLD AUTO: 3 K/UL (ref 4.3–11.1)

## 2025-04-01 PROCEDURE — 80053 COMPREHEN METABOLIC PANEL: CPT

## 2025-04-01 PROCEDURE — 99215 OFFICE O/P EST HI 40 MIN: CPT | Performed by: INTERNAL MEDICINE

## 2025-04-01 PROCEDURE — 36415 COLL VENOUS BLD VENIPUNCTURE: CPT

## 2025-04-01 PROCEDURE — 1160F RVW MEDS BY RX/DR IN RCRD: CPT | Performed by: INTERNAL MEDICINE

## 2025-04-01 PROCEDURE — 1123F ACP DISCUSS/DSCN MKR DOCD: CPT | Performed by: INTERNAL MEDICINE

## 2025-04-01 PROCEDURE — 85025 COMPLETE CBC W/AUTO DIFF WBC: CPT

## 2025-04-01 PROCEDURE — 83735 ASSAY OF MAGNESIUM: CPT

## 2025-04-01 PROCEDURE — 1125F AMNT PAIN NOTED PAIN PRSNT: CPT | Performed by: INTERNAL MEDICINE

## 2025-04-01 PROCEDURE — 1159F MED LIST DOCD IN RCRD: CPT | Performed by: INTERNAL MEDICINE

## 2025-04-01 RX ORDER — METHOTREXATE 2.5 MG/1
15 TABLET ORAL WEEKLY
Qty: 30 TABLET | Refills: 0 | Status: SHIPPED | OUTPATIENT
Start: 2025-04-01

## 2025-04-01 NOTE — PROGRESS NOTES
Most nights [facility] Hematology & Oncology: Office Visit Progress Note    Chief Complaint:    Neutropenia  T-cell LGL leukemia/Felty syndrome    History of Present Illness:  Referral Diagnosis: Other neutropenia; Seropositive rheumatoid arthritis     Referring Provider:  Cammy Maloney MD     Primary Care Provider: Jp Tinsley DO     Family History of Cancer/ Hematology Disorders: Mother with colon cancer     Presenting Symptoms:  Fatigue, fever, arthralgias     Ms. Jaquez is a 79 y.o. white female referred for neutropenia. PMH significant for MS (diagnosed in 1977- no treatment), seropositive rheumatoid arthritis, osteoporosis, hearing loss, and ovarian cancer s/p hysterectomy ~ 1997.      9/25/24: Rheumatology f/u for seropositive rheumatoid arthritis (diagnosed in 2020) and osteoporosis. Pt has not used DMARD therapy before. RA is being treated with prednisone (currently completing steroid pack which started at 60 Mg and found that any dose less than 30 Mg does not control symptoms). Prolia was previously used x 2 doses ending in 2022 for osteoporosis. Pt is counseled to consider restarting Prolia due to severity of her osteoporosis.   -Plan for methotrexate 10-12.5 Mg weekly plus daily folic acid based on labs.  -IM Depo-Medrol 40 Mg given once now. Consider PO pred if indicated for symptos  -Laboratory results showed WBC 1.7, neutrophils 14, lymphocytes 66, monocytes 17, ANC 0.2; sed rate 49, CRP WNL; hepatitis panel nonreactive; Mag, phos, vit D, calcium, PTH intact, incubated quantiferon WNL; CO2 19, glucose 103, globulin 4.1, A/G ratio 0.8 (Epic/Labs)      9/26/24: Dr. Maloney called pt's daughter regarding abnormal neutrophil count - , wbc 1.7; she was advised pt to go to er/UTC if fever lasting +1 hour or if infection to have treatment administered.  -Abdominal US ordered to check for splenomegaly (Scheduled for 10/3/24)   -Referred to [facility]  -Review of records indicates that pt's white

## 2025-04-01 NOTE — PATIENT INSTRUCTIONS
Patient Information from Today's Visit    The members of your [facility] are listed below:    Physician Provider: Job Hinds Medical Oncologist  Advanced Practice Clinician: Nat Eubanks NP  Registered Nurse: Susana CASANOVA   Navigator:   Medical Assistant: Chloe NORMAN MA  : Yoana CASTRO   Supportive Care Services: Darby THOMAS LMSW    Diagnosis:       Follow Up Instructions:       Treatment Summary has been discussed and given to patient:      Current Labs:   Hospital Outpatient Visit on 04/01/2025   Component Date Value Ref Range Status    Magnesium 04/01/2025 2.2  1.8 - 2.4 mg/dL Final    WBC 04/01/2025 3.0 (L)  4.3 - 11.1 K/uL Final    RBC 04/01/2025 4.30  4.05 - 5.2 M/uL Final    Hemoglobin 04/01/2025 13.0  11.7 - 15.4 g/dL Final    Hematocrit 04/01/2025 40.7  35.8 - 46.3 % Final    MCV 04/01/2025 94.7  82.0 - 102.0 FL Final    MCH 04/01/2025 30.2  26.1 - 32.9 PG Final    MCHC 04/01/2025 31.9  31.4 - 35.0 g/dL Final    RDW 04/01/2025 14.2  11.9 - 14.6 % Final    Platelets 04/01/2025 176  150 - 450 K/uL Final    MPV 04/01/2025 9.8  9.4 - 12.3 FL Final    nRBC 04/01/2025 0.00  0.0 - 0.2 K/uL Final    **Note: Absolute NRBC parameter is now reported with Hemogram**    Neutrophils % 04/01/2025 13.7 (L)  43.0 - 78.0 % Final    Lymphocytes % 04/01/2025 69.8 (H)  13.0 - 44.0 % Final    Monocytes % 04/01/2025 12.5 (H)  4.0 - 12.0 % Final    Eosinophils % 04/01/2025 2.7  0.5 - 7.8 % Final    Basophils % 04/01/2025 1.0  0.0 - 2.0 % Final    Immature Granulocytes % 04/01/2025 0.3  0.0 - 5.0 % Final    Neutrophils Absolute 04/01/2025 0.40 (L)  1.70 - 8.20 K/UL Final    Lymphocytes Absolute 04/01/2025 2.06  0.50 - 4.60 K/UL Final    Monocytes Absolute 04/01/2025 0.37  0.10 - 1.30 K/UL Final    Eosinophils Absolute 04/01/2025 0.08  0.00 - 0.80 K/UL Final    Basophils Absolute 04/01/2025 0.03  0.00 - 0.20 K/UL Final    Immature Granulocytes Absolute 04/01/2025 0.01  0.00 - 0.50 K/UL Final

## 2025-04-02 ENCOUNTER — OFFICE VISIT (OUTPATIENT)
Age: 80
End: 2025-04-02
Payer: MEDICARE

## 2025-04-02 DIAGNOSIS — M05.79 RHEUMATOID ARTHRITIS INVOLVING MULTIPLE SITES WITH POSITIVE RHEUMATOID FACTOR (HCC): ICD-10-CM

## 2025-04-02 PROCEDURE — 1159F MED LIST DOCD IN RCRD: CPT | Performed by: PHYSICIAN ASSISTANT

## 2025-04-02 PROCEDURE — 1123F ACP DISCUSS/DSCN MKR DOCD: CPT | Performed by: PHYSICIAN ASSISTANT

## 2025-04-02 PROCEDURE — 99214 OFFICE O/P EST MOD 30 MIN: CPT | Performed by: PHYSICIAN ASSISTANT

## 2025-04-02 RX ORDER — HYDROCODONE BITARTRATE AND ACETAMINOPHEN 7.5; 325 MG/1; MG/1
1 TABLET ORAL EVERY 6 HOURS PRN
Qty: 120 TABLET | Refills: 0 | Status: SHIPPED | OUTPATIENT
Start: 2025-05-06 | End: 2025-06-05

## 2025-04-02 RX ORDER — HYDROCODONE BITARTRATE AND ACETAMINOPHEN 7.5; 325 MG/1; MG/1
1 TABLET ORAL EVERY 6 HOURS PRN
Qty: 120 TABLET | Refills: 0 | Status: SHIPPED | OUTPATIENT
Start: 2025-04-06 | End: 2025-05-06

## 2025-04-02 ASSESSMENT — ENCOUNTER SYMPTOMS
ABDOMINAL PAIN: 0
ALLERGIC/IMMUNOLOGIC NEGATIVE: 1
EYES NEGATIVE: 1
SHORTNESS OF BREATH: 0

## 2025-04-02 NOTE — PROGRESS NOTES
Mrs. Jaquez is a follow-up for medication management for pain secondary to rheumatoid arthritis as well as neck pain.  She has been consistently going to physical therapy at [facility].  She has completed 17 visits of physical therapy with her last treatment last week on 3/27/2025.  
Pain for up to 30 days. Max Daily Amount: 4 tablets 120 tablet 0    [START ON 5/6/2025] HYDROcodone-acetaminophen (NORCO) 7.5-325 MG per tablet Take 1 tablet by mouth every 6 hours as needed for Pain for up to 30 days. Max Daily Amount: 4 tablets 120 tablet 0    methotrexate (RHEUMATREX) 2.5 MG chemo tablet Take 6 tablets by mouth once a week 30 tablet 0    predniSONE (DELTASONE) 5 MG tablet prednisone 15 mg daily for 3 days then 10 mg daily for 3 days then 5 mg daily for 3 days then stop 18 tablet 0    [DISCONTINUED] HYDROcodone-acetaminophen (NORCO) 7.5-325 MG per tablet Take 1 tablet by mouth every 6 hours as needed for Pain for up to 30 days. Max Daily Amount: 4 tablets 120 tablet 0    folic acid (FOLVITE) 1 MG tablet Take 1 tablet by mouth daily 180 tablet 1    pantoprazole (PROTONIX) 40 MG tablet Take 1 tablet by mouth every morning (before breakfast) 90 tablet 1    denosumab (PROLIA) 60 MG/ML SOSY SC injection Inject 1 mL into the skin She had Prolia #2 on 4/14/22.       No facility-administered encounter medications on file as of 4/2/2025.          Review of Systems   Constitutional:  Negative for chills, fatigue, fever and unexpected weight change.   HENT:  Negative for congestion and ear pain.    Eyes: Negative.    Respiratory:  Negative for shortness of breath.    Cardiovascular:  Negative for chest pain.   Gastrointestinal:  Negative for abdominal pain.   Endocrine: Negative.    Genitourinary: Negative.    Skin:  Negative for rash.   Allergic/Immunologic: Negative.    Neurological:  Negative for dizziness.   Hematological: Negative.    Psychiatric/Behavioral: Negative.             The [place] database for controlled substance prescription monitoring was reviewed.    I personally performed the HPI, exam, and assessment/plan, verified the documentation and approve it is updated, accurate, and complete. Parts or the entirety of this document were transcribed utilizing voice recognition software.

## 2025-04-03 ENCOUNTER — HOSPITAL ENCOUNTER (OUTPATIENT)
Dept: PHYSICAL THERAPY | Age: 80
Setting detail: RECURRING SERIES
Discharge: HOME | End: 2025-04-06
Attending: INTERNAL MEDICINE
Payer: MEDICARE

## 2025-04-03 ENCOUNTER — OFFICE VISIT (OUTPATIENT)
Dept: NEUROLOGY | Age: 80
End: 2025-04-03
Payer: MEDICARE

## 2025-04-03 VITALS
SYSTOLIC BLOOD PRESSURE: 132 MMHG | HEART RATE: 100 BPM | BODY MASS INDEX: 31 KG/M2 | DIASTOLIC BLOOD PRESSURE: 90 MMHG | OXYGEN SATURATION: 95 % | WEIGHT: 175 LBS

## 2025-04-03 DIAGNOSIS — G35 MS (MULTIPLE SCLEROSIS) (HCC): Primary | ICD-10-CM

## 2025-04-03 PROCEDURE — 97110 THERAPEUTIC EXERCISES: CPT

## 2025-04-03 PROCEDURE — 99203 OFFICE O/P NEW LOW 30 MIN: CPT | Performed by: NURSE PRACTITIONER

## 2025-04-03 PROCEDURE — 1123F ACP DISCUSS/DSCN MKR DOCD: CPT | Performed by: NURSE PRACTITIONER

## 2025-04-03 PROCEDURE — 1159F MED LIST DOCD IN RCRD: CPT | Performed by: NURSE PRACTITIONER

## 2025-04-03 RX ORDER — ASCORBIC ACID 500 MG
1000 TABLET ORAL DAILY
COMMUNITY

## 2025-04-03 RX ORDER — MULTIVIT-MIN/IRON/FOLIC ACID/K 18-600-40
2000 CAPSULE ORAL DAILY
COMMUNITY

## 2025-04-03 RX ORDER — CHLORAL HYDRATE 500 MG
CAPSULE ORAL DAILY
COMMUNITY

## 2025-04-03 ASSESSMENT — PATIENT HEALTH QUESTIONNAIRE - PHQ9
SUM OF ALL RESPONSES TO PHQ QUESTIONS 1-9: 0
SUM OF ALL RESPONSES TO PHQ QUESTIONS 1-9: 0
2. FEELING DOWN, DEPRESSED OR HOPELESS: NOT AT ALL
SUM OF ALL RESPONSES TO PHQ QUESTIONS 1-9: 0
1. LITTLE INTEREST OR PLEASURE IN DOING THINGS: NOT AT ALL
SUM OF ALL RESPONSES TO PHQ QUESTIONS 1-9: 0
DEPRESSION UNABLE TO ASSESS: FUNCTIONAL CAPACITY MOTIVATION LIMITS ACCURACY

## 2025-04-03 NOTE — ASSESSMENT & PLAN NOTE
Discussed the option with patient and daughter regarding ordering MRIs of her brain and cervical spine.  Patient elected to hold on any neuroimaging orders.      She has a ~55-year history of living with MS and has done very well without any interventions.  Discussed patient can follow-up with me as needed

## 2025-04-03 NOTE — PROGRESS NOTES
[facility] NEUROLOGY  [address], [address]  [place] [address]  Phone: [phone] Fax [phone]  SELENE Guido      Patient: Edelmira Jaquez   Provider: SELENE Guido    CC:   Chief Complaint   Patient presents with    New Patient    Multiple Sclerosis       Referring Provider: Cammy Maloney MD     History of Present Illness:     Edelmira Jaquez is a 79 y.o.  female who presents for evaluation of MS.     History of Present Illness  The patient is an 79-year-old female who was diagnosed with MS in the 70s. She describes an episode of BUE spasticity that lasted for approx 6 weeks. Following this, believes in the 80s, had a problem with her vision. Unclear if she had optic neuritis. She focused on dietary prevention (avoiding processed foods, dyes, etc) and still mostly follows this diet. She has never been on disease modifying therapy.     She denies any further relapses with her MS past the mid 80s. I do not see any neuroimaging to confirm this diagnosis but patients reports lesions in both brain and cervical spine.       No falls in the last 90 days. No fevers, infections, or illnesses. She denies limb spasticity but does endorse left lateral neck spasms that were occurring a few times per week, no pattern or precipitating factors to this.     She does have a diagnosis of RA and leukemia, being treated with methotrexate. Osteoporosis treated with prolia.     She is legally blind secondary to macular degeneration.     She is in PT for balance and fall prevention. This has been going well. They have also been working on her neck spasms which has overall improved.     She does not have a desire for repeat neuroimaging.          Relevant Medications:   Current Relevant Medications: none      Previous Medication Trials: none     Review of Systems:   12 point ROS negative unless indicated above           Lab/Imaging Review:   I REVIEWED PERTINENT LABS AND REPORTS WITH THE PATIENT PERSONALLY,

## 2025-04-03 NOTE — PROGRESS NOTES
Communication/Consultation:  None today  Equipment provided today:  None  Recommendations/Intent for next treatment session: Next visit will focus on modification of therapeutic exercises to pt tolerance.    >Total Treatment Billable Duration:  44 minutes   Time In: 0210  Time Out: 0301    JORDAN TONEY, PT         Charge Capture  Events  [employer] Portal  Appt Desk  Attendance Report     Future Appointments   Date Time Provider Department Center   4/8/2025  2:15 PM Jordan Toney, PT [facility]   4/10/2025  2:15 PM Jordan Toney, PT [facility]   4/15/2025  2:15 PM Jordan Toney, PT [facility]   4/17/2025  2:15 PM Jordan Toney, PT [facility]   4/22/2025  2:15 PM Jordan Toney, PT [facility]   4/24/2025  2:15 PM Jordan Toney, PT [facility]   4/29/2025  2:15 PM Jordan Toney, PT [facility]   4/30/2025 12:50 PM [facility]   4/30/2025  2:00 PM Job Hinds MD [facility]   5/1/2025  2:15 PM Jordan Toney, PT [facility]   5/28/2025  3:15 PM Bisi Martinez PA PM [facility]   7/28/2025 10:00 AM Patric Monaco DO [facility] [place] [facility]

## 2025-04-04 ASSESSMENT — PAIN SCALES - GENERAL: PAINLEVEL_OUTOF10: 2

## 2025-04-08 ENCOUNTER — HOSPITAL ENCOUNTER (OUTPATIENT)
Dept: PHYSICAL THERAPY | Age: 80
Setting detail: RECURRING SERIES
Discharge: HOME | End: 2025-04-11
Attending: INTERNAL MEDICINE
Payer: MEDICARE

## 2025-04-08 PROCEDURE — 97110 THERAPEUTIC EXERCISES: CPT

## 2025-04-08 ASSESSMENT — PAIN SCALES - GENERAL: PAINLEVEL_OUTOF10: 2

## 2025-04-08 NOTE — PROGRESS NOTES
Edelmira Jaquez  : 1945  Primary: [facility] Mmp Dual (Medicare Managed)  Secondary: MEDICAID [facility] @ [place] [address] [place] [address]  Phone: [phone]  Fax: [phone] Plan Frequency: up to 3x/week    Plan of Care/Certification Expiration Date: 25        Plan of Care/Certification Expiration Date:  Plan of Care/Certification Expiration Date: 25    Frequency/Duration:   Plan Frequency: up to 3x/week      Time In/Out:   Time In: 0210  Time Out: 0258      PT Visit Info:         Visit Count:  19    OUTPATIENT PHYSICAL THERAPY:   Treatment Note 2025       Episode  (RA/Neck pain)               Treatment Diagnosis:    Neck pain  Acute bilateral knee pain  Multiple sclerosis (HCC)  Decreased strength of lower extremity  Unsteadiness on feet  Medical/Referring Diagnosis:    Chronic neck pain    Referring Physician:  Cammy Maloney MD MD Orders:  PT Eval and Treat   Return MD Appt:  25   Date of Onset:  20  Allergies:   Statins  Restrictions/Precautions:   None      Interventions Planned (Treatment may consist of any combination of the following):     See Assessment Note    Subjective Comments:   Pt notes that she is in some minor pain today stating that her L hand is bothering her, but she did not take a pain pill prior to coming today.   Initial Pain Level::     2/10  Post Session Pain Level:       1/10  Medications Last Reviewed: 2025  Updated Objective Findings:   Findings consistent with initial evaluation unless noted otherwise.   Update 3/13/25:  ROM:   Cervical L rotation = 65 deg;  R rotation = 50 deg     MMT:   0-5 scale Left _/5 Right _/5   Hip flexion 4-/5 4-/5   Hip extension 4-/5 4-/5   Hip abduction 5/5 5/5   Hip adduciton 5/5 5/5   Knee flexion 4/5 4+/5   Knee extension 4/5 4+/5   Ankle dorsiflexion 4+/5 4+/5   Ankle plantarflexion 4+/5 4+/5        Special tests:   Single leg stance time:  L SLS = 5.1 second;

## 2025-04-10 ENCOUNTER — APPOINTMENT (OUTPATIENT)
Dept: PHYSICAL THERAPY | Age: 80
End: 2025-04-10
Attending: INTERNAL MEDICINE
Payer: MEDICARE

## 2025-04-15 ENCOUNTER — APPOINTMENT (OUTPATIENT)
Dept: PHYSICAL THERAPY | Age: 80
End: 2025-04-15
Attending: INTERNAL MEDICINE
Payer: MEDICARE

## 2025-04-22 ENCOUNTER — HOSPITAL ENCOUNTER (OUTPATIENT)
Dept: PHYSICAL THERAPY | Age: 80
Setting detail: RECURRING SERIES
Discharge: HOME | End: 2025-04-25
Attending: INTERNAL MEDICINE
Payer: MEDICARE

## 2025-04-22 PROCEDURE — 97110 THERAPEUTIC EXERCISES: CPT

## 2025-04-22 NOTE — CERTIFICATION
Edelmira Jaquez  : 1945  Primary: [facility] Mmp Dual (Medicare Managed)  Secondary: MEDICAID [facility] @ [place] [address] [address] [place] [address]  Phone: [phone]  Fax: [phone] Plan Frequency: up to 3x/week    Plan of Care/Certification Expiration Date: 25        Plan of Care/Certification Expiration Date:  Plan of Care/Certification Expiration Date: 25    Frequency/Duration:   Plan Frequency: up to 3x/week      Time In/Out:   Time In: 0210  Time Out: 0305      PT Visit Info:         Visit Count:  20    OUTPATIENT PHYSICAL THERAPY:   Recert/Progress/Treatment Note 2025       Episode  (RA/Neck pain)               Treatment Diagnosis:    Neck pain  Acute bilateral knee pain  Multiple sclerosis (HCC)  Decreased strength of lower extremity  Unsteadiness on feet  Medical/Referring Diagnosis:    Chronic neck pain    Referring Physician:  Cammy Maloney MD MD Orders:  PT Eval and Treat   Return MD Appt:  25   Date of Onset:  20  Allergies:   Statins  Restrictions/Precautions:   None      Interventions Planned (Treatment may consist of any combination of the following):     See Assessment Note    Subjective Comments:   Pt notes that she is in some pain today noting that she has had a bad arthritis flare up over the past few days.    Initial Pain Level::     3/10  Post Session Pain Level:       2/10  Medications Last Reviewed: 2025  Updated Objective Findings:   Findings consistent with initial evaluation unless noted otherwise.   Update 3/13/25:  ROM:   Cervical L rotation = 68 deg;  R rotation = 50 deg     MMT:   0-5 scale Left _/5 Right _/5   Hip flexion 4-/5 4/5   Hip extension 4+/5 4+/5   Hip abduction 5/5 5/5   Hip adduciton 5/5 5/5   Knee flexion 4+/5 4+/5   Knee extension 4+/5 4+/5   Ankle dorsiflexion 4+/5 4+/5   Ankle plantarflexion 4+/5 4+/5        Special tests:   Single leg stance time:  L SLS = 4.2 second;  R SLS = 5.2

## 2025-04-23 ASSESSMENT — PAIN SCALES - GENERAL: PAINLEVEL_OUTOF10: 3

## 2025-04-24 ENCOUNTER — HOSPITAL ENCOUNTER (OUTPATIENT)
Dept: PHYSICAL THERAPY | Age: 80
Setting detail: RECURRING SERIES
Discharge: HOME | End: 2025-04-27
Attending: INTERNAL MEDICINE
Payer: MEDICARE

## 2025-04-24 PROCEDURE — 97110 THERAPEUTIC EXERCISES: CPT

## 2025-04-24 NOTE — PROGRESS NOTES
Edelmira Jaquez  : 1945  Primary: [facility] Mmp Dual (Medicare Managed)  Secondary: MEDICAID [facility] @ [place] [address] [address] [place] [address]  Phone: [phone]  Fax: [phone] Plan Frequency: up to 3x/week    Plan of Care/Certification Expiration Date: 25        Plan of Care/Certification Expiration Date:  Plan of Care/Certification Expiration Date: 25    Frequency/Duration:   Plan Frequency: up to 3x/week      Time In/Out:   Time In: 215  Time Out: 314      PT Visit Info:         Visit Count:  21    OUTPATIENT PHYSICAL THERAPY:   Treatment Note 2025       Episode  (RA/Neck pain)               Treatment Diagnosis:    Neck pain  Acute bilateral knee pain  Multiple sclerosis (HCC)  Decreased strength of lower extremity  Unsteadiness on feet  Medical/Referring Diagnosis:    Chronic neck pain    Referring Physician:  Cammy Maloney MD MD Orders:  PT Eval and Treat   Return MD Appt:  25   Date of Onset:  20  Allergies:   Statins  Restrictions/Precautions:   None      Interventions Planned (Treatment may consist of any combination of the following):     See Assessment Note    Subjective Comments:   Pt notes that she is in some pain today but mostly generalized pain in her neck and hands.    Initial Pain Level::     4/10  Post Session Pain Level:       2/10  Medications Last Reviewed: 2025  Updated Objective Findings:   Findings consistent with initial evaluation unless noted otherwise.   Update 3/13/25:  ROM:   Cervical L rotation = 68 deg;  R rotation = 50 deg     MMT:   0-5 scale Left _/5 Right _/5   Hip flexion 4-/5 4/5   Hip extension 4+/5 4+/5   Hip abduction 5/5 5/5   Hip adduciton 5/5 5/5   Knee flexion 4+/5 4+/5   Knee extension 4+/5 4+/5   Ankle dorsiflexion 4+/5 4+/5   Ankle plantarflexion 4+/5 4+/5        Special tests:   Single leg stance time:  L SLS = 4.2 second;  R SLS = 5.2 seconds     Timed up and go test =

## 2025-04-25 ASSESSMENT — PAIN SCALES - GENERAL: PAINLEVEL_OUTOF10: 4

## 2025-04-29 ENCOUNTER — HOSPITAL ENCOUNTER (OUTPATIENT)
Dept: PHYSICAL THERAPY | Age: 80
Setting detail: RECURRING SERIES
Discharge: HOME | End: 2025-05-02
Attending: INTERNAL MEDICINE
Payer: MEDICARE

## 2025-04-29 PROCEDURE — 97140 MANUAL THERAPY 1/> REGIONS: CPT

## 2025-04-29 PROCEDURE — 97110 THERAPEUTIC EXERCISES: CPT

## 2025-04-30 ENCOUNTER — HOSPITAL ENCOUNTER (OUTPATIENT)
Dept: LAB | Age: 80
Discharge: HOME | End: 2025-04-30
Payer: MEDICARE

## 2025-04-30 ENCOUNTER — OFFICE VISIT (OUTPATIENT)
Dept: ONCOLOGY | Age: 80
End: 2025-04-30
Payer: MEDICARE

## 2025-04-30 VITALS
HEART RATE: 71 BPM | WEIGHT: 173 LBS | BODY MASS INDEX: 30.65 KG/M2 | TEMPERATURE: 97.4 F | OXYGEN SATURATION: 96 % | RESPIRATION RATE: 18 BRPM | SYSTOLIC BLOOD PRESSURE: 147 MMHG | HEIGHT: 63 IN | DIASTOLIC BLOOD PRESSURE: 71 MMHG

## 2025-04-30 DIAGNOSIS — T45.1X5A CHEMOTHERAPY-INDUCED NEUTROPENIA: ICD-10-CM

## 2025-04-30 DIAGNOSIS — C91.Z0 T-CELL LARGE GRANULAR LYMPHOCYTIC LEUKEMIA (HCC): Primary | ICD-10-CM

## 2025-04-30 DIAGNOSIS — C91.Z0 T-CELL LARGE GRANULAR LYMPHOCYTIC LEUKEMIA (HCC): ICD-10-CM

## 2025-04-30 DIAGNOSIS — R52 PAIN: ICD-10-CM

## 2025-04-30 DIAGNOSIS — Z51.11 ENCOUNTER FOR ANTINEOPLASTIC CHEMOTHERAPY: ICD-10-CM

## 2025-04-30 DIAGNOSIS — D70.1 CHEMOTHERAPY-INDUCED NEUTROPENIA: ICD-10-CM

## 2025-04-30 DIAGNOSIS — Z79.899 HIGH RISK MEDICATION USE: ICD-10-CM

## 2025-04-30 LAB
ALBUMIN SERPL-MCNC: 3.6 G/DL (ref 3.2–4.6)
ALBUMIN/GLOB SERPL: 0.9 (ref 1–1.9)
ALP SERPL-CCNC: 61 U/L (ref 35–104)
ALT SERPL-CCNC: 13 U/L (ref 8–45)
ANION GAP SERPL CALC-SCNC: 11 MMOL/L (ref 7–16)
AST SERPL-CCNC: 19 U/L (ref 15–37)
BASOPHILS # BLD: 0.02 K/UL (ref 0–0.2)
BASOPHILS NFR BLD: 0.6 % (ref 0–2)
BILIRUB SERPL-MCNC: 0.3 MG/DL (ref 0–1.2)
BUN SERPL-MCNC: 15 MG/DL (ref 8–23)
CALCIUM SERPL-MCNC: 9.3 MG/DL (ref 8.8–10.2)
CHLORIDE SERPL-SCNC: 104 MMOL/L (ref 98–107)
CO2 SERPL-SCNC: 25 MMOL/L (ref 20–29)
CREAT SERPL-MCNC: 0.64 MG/DL (ref 0.6–1.1)
DIFFERENTIAL METHOD BLD: ABNORMAL
EOSINOPHIL # BLD: 0.07 K/UL (ref 0–0.8)
EOSINOPHIL NFR BLD: 2.2 % (ref 0.5–7.8)
ERYTHROCYTE [DISTWIDTH] IN BLOOD BY AUTOMATED COUNT: 14.5 % (ref 11.9–14.6)
GLOBULIN SER CALC-MCNC: 4 G/DL (ref 2.3–3.5)
GLUCOSE SERPL-MCNC: 91 MG/DL (ref 70–99)
HCT VFR BLD AUTO: 40.2 % (ref 35.8–46.3)
HGB BLD-MCNC: 13.1 G/DL (ref 11.7–15.4)
IMM GRANULOCYTES # BLD AUTO: 0 K/UL (ref 0–0.5)
IMM GRANULOCYTES NFR BLD AUTO: 0 % (ref 0–5)
LYMPHOCYTES # BLD: 2.1 K/UL (ref 0.5–4.6)
LYMPHOCYTES NFR BLD: 64.6 % (ref 13–44)
MAGNESIUM SERPL-MCNC: 2.2 MG/DL (ref 1.8–2.4)
MCH RBC QN AUTO: 30 PG (ref 26.1–32.9)
MCHC RBC AUTO-ENTMCNC: 32.6 G/DL (ref 31.4–35)
MCV RBC AUTO: 92.2 FL (ref 82–102)
MONOCYTES # BLD: 0.47 K/UL (ref 0.1–1.3)
MONOCYTES NFR BLD: 14.5 % (ref 4–12)
NEUTS SEG # BLD: 0.59 K/UL (ref 1.7–8.2)
NEUTS SEG NFR BLD: 18.1 % (ref 43–78)
NRBC # BLD: 0 K/UL (ref 0–0.2)
PLATELET # BLD AUTO: 153 K/UL (ref 150–450)
PMV BLD AUTO: 9.6 FL (ref 9.4–12.3)
POTASSIUM SERPL-SCNC: 3.9 MMOL/L (ref 3.5–5.1)
PROT SERPL-MCNC: 7.5 G/DL (ref 6.3–8.2)
RBC # BLD AUTO: 4.36 M/UL (ref 4.05–5.2)
SODIUM SERPL-SCNC: 140 MMOL/L (ref 136–145)
WBC # BLD AUTO: 3.3 K/UL (ref 4.3–11.1)

## 2025-04-30 PROCEDURE — 85025 COMPLETE CBC W/AUTO DIFF WBC: CPT

## 2025-04-30 PROCEDURE — 83735 ASSAY OF MAGNESIUM: CPT

## 2025-04-30 PROCEDURE — 1123F ACP DISCUSS/DSCN MKR DOCD: CPT | Performed by: INTERNAL MEDICINE

## 2025-04-30 PROCEDURE — 80053 COMPREHEN METABOLIC PANEL: CPT

## 2025-04-30 PROCEDURE — 1160F RVW MEDS BY RX/DR IN RCRD: CPT | Performed by: INTERNAL MEDICINE

## 2025-04-30 PROCEDURE — 1159F MED LIST DOCD IN RCRD: CPT | Performed by: INTERNAL MEDICINE

## 2025-04-30 PROCEDURE — 36415 COLL VENOUS BLD VENIPUNCTURE: CPT

## 2025-04-30 PROCEDURE — 1125F AMNT PAIN NOTED PAIN PRSNT: CPT | Performed by: INTERNAL MEDICINE

## 2025-04-30 PROCEDURE — 99215 OFFICE O/P EST HI 40 MIN: CPT | Performed by: INTERNAL MEDICINE

## 2025-04-30 RX ORDER — METHOTREXATE 2.5 MG/1
15 TABLET ORAL WEEKLY
Qty: 30 TABLET | Refills: 0 | Status: SHIPPED | OUTPATIENT
Start: 2025-04-30

## 2025-04-30 RX ORDER — PREDNISONE 5 MG/1
TABLET ORAL
Qty: 18 TABLET | Refills: 0 | Status: SHIPPED | OUTPATIENT
Start: 2025-04-30

## 2025-04-30 RX ORDER — PANTOPRAZOLE SODIUM 40 MG/1
40 TABLET, DELAYED RELEASE ORAL
Qty: 90 TABLET | Refills: 1 | Status: SHIPPED | OUTPATIENT
Start: 2025-04-30

## 2025-04-30 ASSESSMENT — PAIN SCALES - GENERAL: PAINLEVEL_OUTOF10: 5

## 2025-04-30 NOTE — PROGRESS NOTES
Most nights [facility] Hematology & Oncology: Office Visit Progress Note    Chief Complaint:    Neutropenia  T-cell LGL leukemia/Felty syndrome    History of Present Illness:  Referral Diagnosis: Other neutropenia; Seropositive rheumatoid arthritis     Referring Provider:  Cammy Maloney MD     Primary Care Provider: Jp Tinsley DO     Family History of Cancer/ Hematology Disorders: Mother with colon cancer     Presenting Symptoms:  Fatigue, fever, arthralgias     Ms. Jaquez is a 79 y.o. white female referred for neutropenia. PMH significant for MS (diagnosed in 1977- no treatment), seropositive rheumatoid arthritis, osteoporosis, hearing loss, and ovarian cancer s/p hysterectomy ~ 1997.      9/25/24: Rheumatology f/u for seropositive rheumatoid arthritis (diagnosed in 2020) and osteoporosis. Pt has not used DMARD therapy before. RA is being treated with prednisone (currently completing steroid pack which started at 60 Mg and found that any dose less than 30 Mg does not control symptoms). Prolia was previously used x 2 doses ending in 2022 for osteoporosis. Pt is counseled to consider restarting Prolia due to severity of her osteoporosis.   -Plan for methotrexate 10-12.5 Mg weekly plus daily folic acid based on labs.  -IM Depo-Medrol 40 Mg given once now. Consider PO pred if indicated for symptos  -Laboratory results showed WBC 1.7, neutrophils 14, lymphocytes 66, monocytes 17, ANC 0.2; sed rate 49, CRP WNL; hepatitis panel nonreactive; Mag, phos, vit D, calcium, PTH intact, incubated quantiferon WNL; CO2 19, glucose 103, globulin 4.1, A/G ratio 0.8 (Epic/Labs)      9/26/24: Dr. Maloney called pt's daughter regarding abnormal neutrophil count - , wbc 1.7; she was advised pt to go to er/UTC if fever lasting +1 hour or if infection to have treatment administered.  -Abdominal US ordered to check for splenomegaly (Scheduled for 10/3/24)   -Referred to [facility]  -Review of records indicates that pt's white

## 2025-04-30 NOTE — PROGRESS NOTES
seconds     Outcome Measure:   Tool Used: Neck Disability Index (NDI)  Score:  Initial: 26/50 (1/29/25) Recent: 28/50 (Date: 3/13/25 ) Most Recent: 27/50 (4/22/25)   Interpretation of Score: The Neck Disability Index is a revised form of the Oswestry Low Back Pain Index and is designed to measure the activities of daily living in person's with neck pain.  Each section is scored on a 0-5 scale, 5 representing the greatest disability.  The scores of each section are added together for a total score of 50.    Goals: (Goals have been discussed and agreed upon with patient.)     Short-Term Functional Goals: Time Frame: 4 weeks  Pt will increase compliance and independence with HEP to 90% to increase rehab potential MET  Pt will improve bilateral single leg stance time to 10 seconds Progressing  Pt will improve Timed up and go test to under 11 seconds to show decresed fall risk MET  Discharge Goals: Time Frame: 10 weeks  Pt will improve gross UE and LE MMT testing to 4+/5 to return to PLOF Progressing  Pt will improve NDI score by 1 MDC to objectively show improvement in function. Ongoing  Pt will report less than 3/10 pain with daily activities such as cooking and cleaning. Ongoing (3/10)       Treatment   THERAPEUTIC EXERCISE: (See minutes below):    Exercises per grid below to improve mobility and strength.  Required minimal visual, verbal, and tactile cues to promote proper body alignment and promote proper body mechanics.  Progressed resistance and repetitions as indicated.  THERAPEUTIC ACTIVITY: ( See minutes below):    Therapeutic activities per grid below to improve mobility, strength, and balance.  Required minimal visual, verbal, and tactile cues to promote dynamic balance in standing.  NEUROMUSCULAR RE-EDUCATION: (See minutes below):    Exercise/activities per grid below to improve balance, coordination, and proprioception.  Required minimal visual, verbal, and tactile cues to promote dynamic balance in

## 2025-04-30 NOTE — PROGRESS NOTES
Oral Chemotherapy Adherence:     Current Regimen:  Drug Name: Methotrexate   Dose: 15mg  Frequency: weekly    Barriers to care identified including (financial, physical, psychosocial) : No    Missed doses reported: No    Patient verbalizes understanding of what to do in the event of a missed dose: Yes    Adverse reactions/toxicities reported:None, See Review of Systems

## 2025-05-01 ENCOUNTER — HOSPITAL ENCOUNTER (OUTPATIENT)
Dept: PHYSICAL THERAPY | Age: 80
Setting detail: RECURRING SERIES
Discharge: HOME | End: 2025-05-04
Attending: INTERNAL MEDICINE
Payer: MEDICARE

## 2025-05-01 PROCEDURE — 97113 AQUATIC THERAPY/EXERCISES: CPT

## 2025-05-01 NOTE — PROGRESS NOTES
Edemlira Jaquez  : 1945  Primary: [facility] Mmp Dual (Medicare Managed)  Secondary:  [facility] [place] [facility] [place]  [place] [address]  Phone: [phone]  Fax: [phone] Plan Frequency: up to 3x/week    Plan of Care/Certification Expiration Date: 25        Plan of Care/Certification Expiration Date:  Plan of Care/Certification Expiration Date: 25    Frequency/Duration:   Plan Frequency: up to 3x/week      Time In/Out:   Time In: 0214  Time Out: 0300      PT Visit Info:         Visit Count:  23    OUTPATIENT PHYSICAL THERAPY:   Treatment Note 2025       Episode  (RA/Neck pain)               Treatment Diagnosis:    Neck pain  Acute bilateral knee pain  Multiple sclerosis (HCC)  Decreased strength of lower extremity  Unsteadiness on feet  Medical/Referring Diagnosis:    Chronic neck pain    Referring Physician:  Cammy Maloney MD MD Orders:  PT Eval and Treat   Return MD Appt:  25   Date of Onset:  20  Allergies:   Statins  Restrictions/Precautions:   None      Interventions Planned (Treatment may consist of any combination of the following):     See Assessment Note    Subjective Comments:   Pt notes that she is not in any pain today as she is taking prednisone.   Initial Pain Level::     0/10  Post Session Pain Level:       0/10  Medications Last Reviewed: 2025  Updated Objective Findings:   Findings consistent with initial evaluation unless noted otherwise.   Update 3/13/25:  ROM:   Cervical L rotation = 68 deg;  R rotation = 50 deg     MMT:   0-5 scale Left _/5 Right _/5   Hip flexion 4-/5 4/5   Hip extension 4+/5 4+/5   Hip abduction 5/5 5/5   Hip adduciton 5/5 5/5   Knee flexion 4+/5 4+/5   Knee extension 4+/5 4+/5   Ankle dorsiflexion 4+/5 4+/5   Ankle plantarflexion 4+/5 4+/5        Special tests:   Single leg stance time:  L SLS = 4.2 second;  R SLS = 5.2 seconds     Timed up and go test = 11.0 seconds, 9.4 seconds, 9.0

## 2025-05-02 ASSESSMENT — PAIN SCALES - GENERAL: PAINLEVEL_OUTOF10: 0

## 2025-05-06 ENCOUNTER — HOSPITAL ENCOUNTER (OUTPATIENT)
Dept: PHYSICAL THERAPY | Age: 80
Setting detail: RECURRING SERIES
Discharge: HOME | End: 2025-05-09
Attending: INTERNAL MEDICINE
Payer: MEDICARE

## 2025-05-06 PROCEDURE — 97110 THERAPEUTIC EXERCISES: CPT

## 2025-05-06 ASSESSMENT — PAIN SCALES - GENERAL: PAINLEVEL_OUTOF10: 0

## 2025-05-07 ENCOUNTER — HOSPITAL ENCOUNTER (OUTPATIENT)
Dept: PHYSICAL THERAPY | Age: 80
Setting detail: RECURRING SERIES
Discharge: HOME | End: 2025-05-10
Attending: INTERNAL MEDICINE
Payer: MEDICARE

## 2025-05-07 PROCEDURE — 97110 THERAPEUTIC EXERCISES: CPT

## 2025-05-07 ASSESSMENT — PAIN SCALES - GENERAL: PAINLEVEL_OUTOF10: 0

## 2025-05-07 NOTE — PROGRESS NOTES
Edelmira Jaquez  : 1945  Primary: [facility] Mmp Dual (Medicare Managed)  Secondary:  [facility] [place] @ [place]  [place] [address]  Phone: [phone]  Fax: [phone] Plan Frequency: up to 3x/week    Plan of Care/Certification Expiration Date: 25        Plan of Care/Certification Expiration Date:  Plan of Care/Certification Expiration Date: 25    Frequency/Duration:   Plan Frequency: up to 3x/week      Time In/Out:   Time In: 0213  Time Out: 0303      PT Visit Info:         Visit Count:  25    OUTPATIENT PHYSICAL THERAPY:   Treatment Note 2025       Episode  (RA/Neck pain)               Treatment Diagnosis:    Neck pain  Acute bilateral knee pain  Multiple sclerosis (HCC)  Decreased strength of lower extremity  Unsteadiness on feet  Medical/Referring Diagnosis:    Chronic neck pain    Referring Physician:  Cammy Maloney MD MD Orders:  PT Eval and Treat   Return MD Appt:  25   Date of Onset:  20  Allergies:   Statins  Restrictions/Precautions:   None      Interventions Planned (Treatment may consist of any combination of the following):     See Assessment Note    Subjective Comments:   Pt notes that she is not in pain due to her taking prednisone. Pt notes only minor stiffness.    Initial Pain Level::     0/10  Post Session Pain Level:       0/10  Medications Last Reviewed: 2025  Updated Objective Findings:   Findings consistent with initial evaluation unless noted otherwise.   Update 3/13/25:  ROM:   Cervical L rotation = 68 deg;  R rotation = 50 deg     MMT:   0-5 scale Left _/5 Right _/5   Hip flexion 4-/5 4/5   Hip extension 4+/5 4+/5   Hip abduction 5/5 5/5   Hip adduciton 5/5 5/5   Knee flexion 4+/5 4+/5   Knee extension 4+/5 4+/5   Ankle dorsiflexion 4+/5 4+/5   Ankle plantarflexion 4+/5 4+/5        Special tests:   Single leg stance time:  L SLS = 4.2 second;  R SLS = 5.2 seconds     Timed up and go test = 11.0

## 2025-05-07 NOTE — PROGRESS NOTES
Flexion Stretch  - 1 x daily - 7 x weekly - 3 sets - 10 reps  - Seated March  - 1 x daily - 7 x weekly - 3 sets - 10 reps  - Seated Long Arc Quad  - 1 x daily - 7 x weekly - 3 sets - 10 reps  - Standing Tricep Extensions with Resistance  - 1 x daily - 7 x weekly - 3 sets - 10 reps  - Shoulder Extension with Resistance  - 1 x daily - 7 x weekly - 3 sets - 10 reps  - Standing Bilateral Low Shoulder Row with Anchored Resistance  - 1 x daily - 7 x weekly - 3 sets - 10 reps    Treatment/Session Summary:    Treatment Assessment:   Pt completed therapeutic exercises with good tolerance to challenging exercises focusing on BLE strength and endurance while challenging functional balance in exercises such as object manipulation from varying surfaces while on airex with weight up to 7lb today. Pt continues to report fatigue with exercises, but improved balance is seen.    Communication/Consultation:  None today  Equipment provided today:  None  Recommendations/Intent for next treatment session: Next visit will focus on modification of therapeutic exercises to pt tolerance.    >Total Treatment Billable Duration:  45 minutes   Time In: 0210  Time Out: 0305    JORDAN TONEY, PT         Charge Capture  Events  [employer]  Appt Desk  Attendance Report     Future Appointments   Date Time Provider Department Center   5/7/2025  2:15 PM Jordan Toney, PT [facility]   5/13/2025  2:15 PM Jordan Toney, PT [facility]   5/20/2025  4:00 PM Jordan Toney, PT [facility]   5/22/2025  4:00 PM Jordan Toney, PT [facility]   5/27/2025  1:30 PM [facility]   5/27/2025  2:30 PM Job Hinds MD [facility]   5/27/2025  4:00 PM Jordan Toney, PT [facility]   5/28/2025  3:15 PM Bisi Martinez, PA PM [facility]   6/19/2025 11:00 AM Patric Monaco DO [facility]

## 2025-05-13 ENCOUNTER — HOSPITAL ENCOUNTER (OUTPATIENT)
Dept: PHYSICAL THERAPY | Age: 80
Setting detail: RECURRING SERIES
Discharge: HOME | End: 2025-05-16
Attending: INTERNAL MEDICINE
Payer: MEDICARE

## 2025-05-13 PROCEDURE — 97110 THERAPEUTIC EXERCISES: CPT

## 2025-05-13 ASSESSMENT — PAIN SCALES - GENERAL: PAINLEVEL_OUTOF10: 3

## 2025-05-13 NOTE — PROGRESS NOTES
Edelmira Jaquez  : 1945  Primary: [facility] Mmp Dual (Medicare Managed)  Secondary:  [facility] [place]  [place] [address]  Phone: [phone]  Fax: [phone] Plan Frequency: up to 3x/week    Plan of Care/Certification Expiration Date: 25        Plan of Care/Certification Expiration Date:  Plan of Care/Certification Expiration Date: 25    Frequency/Duration:   Plan Frequency: up to 3x/week      Time In/Out:   Time In: 0215  Time Out: 0300      PT Visit Info:         Visit Count:  26    OUTPATIENT PHYSICAL THERAPY:   Treatment Note 2025       Episode  (RA/Neck pain)               Treatment Diagnosis:    Neck pain  Acute bilateral knee pain  Multiple sclerosis (HCC)  Decreased strength of lower extremity  Unsteadiness on feet  Medical/Referring Diagnosis:    Chronic neck pain    Referring Physician:  Cammy Maloney MD MD Orders:  PT Eval and Treat   Return MD Appt:  25   Date of Onset:  20  Allergies:   Statins  Restrictions/Precautions:   None      Interventions Planned (Treatment may consist of any combination of the following):     See Assessment Note    Subjective Comments:   Pt notes that she is having some minor pain today in her shoulder and neck reporting she is no longer taking prednisone.   Initial Pain Level::     3/10  Post Session Pain Level:       2/10  Medications Last Reviewed: 2025  Updated Objective Findings:   Findings consistent with initial evaluation unless noted otherwise.   Update 3/13/25:  ROM:   Cervical L rotation = 68 deg;  R rotation = 50 deg     MMT:   0-5 scale Left _/5 Right _/5   Hip flexion 4-/5 4/5   Hip extension 4+/5 4+/5   Hip abduction 5/5 5/5   Hip adduciton 5/5 5/5   Knee flexion 4+/5 4+/5   Knee extension 4+/5 4+/5   Ankle dorsiflexion 4+/5 4+/5   Ankle plantarflexion 4+/5 4+/5        Special tests:   Single leg stance time:  L SLS = 4.2 second;  R SLS = 5.2 seconds     Timed

## 2025-05-14 ENCOUNTER — TELEPHONE (OUTPATIENT)
Dept: RHEUMATOLOGY | Age: 80
End: 2025-05-14

## 2025-05-14 NOTE — TELEPHONE ENCOUNTER
OV and Prolia 6/16/25 with Dr. Monaco. Last vit d was 12/3/24. Do you want repeated prior to visit, or draw at visit?   Lab Results   Component Value Date     04/30/2025    K 3.9 04/30/2025     04/30/2025    CO2 25 04/30/2025    BUN 15 04/30/2025    CREATININE 0.64 04/30/2025    GLUCOSE 91 04/30/2025    CALCIUM 9.3 04/30/2025    BILITOT 0.3 04/30/2025    ALKPHOS 61 04/30/2025    AST 19 04/30/2025    ALT 13 04/30/2025    LABGLOM 89 04/30/2025    GFRAA >60 08/02/2022    AGRATIO 1.2 05/05/2022    GLOB 4.0 (H) 04/30/2025     Lab Results   Component Value Date/Time    VITD25 44.4 12/03/2024 12:06 PM

## 2025-05-20 ENCOUNTER — HOSPITAL ENCOUNTER (OUTPATIENT)
Dept: PHYSICAL THERAPY | Age: 80
Setting detail: RECURRING SERIES
Discharge: HOME | End: 2025-05-23
Attending: INTERNAL MEDICINE
Payer: MEDICARE

## 2025-05-20 PROCEDURE — 97110 THERAPEUTIC EXERCISES: CPT

## 2025-05-20 ASSESSMENT — PAIN SCALES - GENERAL: PAINLEVEL_OUTOF10: 4

## 2025-05-20 NOTE — PROGRESS NOTES
Edelmira Jaquez  : 1945  Primary: [facility] Mmp Dual (Medicare Managed)  Secondary:  [facility] @ [place]  [place] [address]  Phone: [phone]  Fax: [phone] Plan Frequency: up to 3x/week    Plan of Care/Certification Expiration Date: 25        Plan of Care/Certification Expiration Date:  Plan of Care/Certification Expiration Date: 25    Frequency/Duration:   Plan Frequency: up to 3x/week      Time In/Out:   Time In: 357  Time Out: 045      PT Visit Info:         Visit Count:  27    OUTPATIENT PHYSICAL THERAPY:   Treatment Note 2025       Episode  (RA/Neck pain)               Treatment Diagnosis:    Neck pain  Acute bilateral knee pain  Multiple sclerosis (HCC)  Decreased strength of lower extremity  Unsteadiness on feet  Medical/Referring Diagnosis:    Chronic neck pain    Referring Physician:  Cammy Maloney MD MD Orders:  PT Eval and Treat   Return MD Appt:  25   Date of Onset:  20  Allergies:   Statins  Restrictions/Precautions:   None      Interventions Planned (Treatment may consist of any combination of the following):     See Assessment Note    Subjective Comments:   Pt notes that she is having some minor pain today in her R shoulder and neck today to get started with therapy.   Initial Pain Level::     4/10  Post Session Pain Level:       2/10  Medications Last Reviewed: 2025  Updated Objective Findings:   Findings consistent with initial evaluation unless noted otherwise.   Update 3/13/25:  ROM:   Cervical L rotation = 68 deg;  R rotation = 50 deg     MMT:   0-5 scale Left _/5 Right _/5   Hip flexion 4-/5 4/5   Hip extension 4+/5 4+/5   Hip abduction 5/5 5/5   Hip adduciton 5/5 5/5   Knee flexion 4+/5 4+/5   Knee extension 4+/5 4+/5   Ankle dorsiflexion 4+/5 4+/5   Ankle plantarflexion 4+/5 4+/5        Special tests:   Single leg stance time:  L SLS = 4.2 second;  R SLS = 5.2 seconds     Timed up and go

## 2025-05-22 ENCOUNTER — HOSPITAL ENCOUNTER (OUTPATIENT)
Dept: PHYSICAL THERAPY | Age: 80
Setting detail: RECURRING SERIES
Discharge: HOME | End: 2025-05-25
Attending: INTERNAL MEDICINE
Payer: MEDICARE

## 2025-05-22 PROCEDURE — 97110 THERAPEUTIC EXERCISES: CPT

## 2025-05-22 ASSESSMENT — PAIN SCALES - GENERAL: PAINLEVEL_OUTOF10: 2

## 2025-05-22 NOTE — PROGRESS NOTES
minutes below):   Joint mobilization and Soft tissue mobilization was utilized and necessary because of the patient's restricted joint motion and restricted motion of soft tissue.   Date: 5/1/25  Visit 23 5/6/25  Visit 24 5/7/25  Visit 25 5/13/25  Visit 26 5/20/25  Visit 27 5/22/25  Visit 28/PN   Modalities:                                    Therapeutic Exercise: 44 min 45 min 45 min 43 min 53 min 55 min   Home Exercise program         Patient education Functional breathing, energy conservation Postural edu, energy conservation Functional breathing techniques, postural edu Postural edu, functional breathing Energy conservation, functional breathing techniques HEP importance, community exercise program   Recumbent bike R12x 6 min R12x 6 min R15 x6 min R 15 x7 min R12 x7 min R12 x8 min   Seated LAQ 2x10 ea w/5lb 2x10 ea w/5lb 2x10 ea w/5lb   3x10 ea w/5lb   Seated march 2x10 ea w/5lb 2x10 ea w/5lb 2x10 ea w/5lb   3x10 ea w/5lb   Standing HS curl 2x10 ea w/5lb 2x10 ea w/5lb 2x10 ea w/5lb   3x10 ea w/5lb   Seated hip adduction/abduction isometric X20 w/3s hold        SL hip flexion target tap  (Fwd/sideways)   X10 ea to BOSU target  2x10  ea. to tall box     Step ups 3x10 ea. To yoga+ airex 2x10 to airex  2x10 to airex 2x10 to yoga+airex 2x10 to yoga+airex 2x10 to yoga+airex   Sit <> stand X10 on airex; 2x10 w/6lb 2x10 w/10lb 3x10 w/10lb X10 w/6lb; 2x10 w/10lb 2x10 w/10lb 3x10 w/10lb   Physioball circuit  (HS curl, bridge, Heel drop)  2x10 ea X20 ea X20 ea  X20 ea   Lower trunk rotation (feet together/ feet apart)  X10 ea  X10 ea.      Door stretches  (A,T,Y)          Physioball roll out   (Flexion/ extension w/Y)  x15 X10 ea x15 x20 x10   Seated shoulder pulley X3 min X4 min X3 min X4 min X5 min X4 min   Triceps pull down         Shoulder extension         Shoulder low row          Supine bent knee fallout         Standing hip 3 way (flx, abd, ext)         Timed up and go test    x3  x2   Standing balance activities

## 2025-05-27 ENCOUNTER — OFFICE VISIT (OUTPATIENT)
Dept: ONCOLOGY | Age: 80
End: 2025-05-27
Payer: MEDICARE

## 2025-05-27 ENCOUNTER — HOSPITAL ENCOUNTER (OUTPATIENT)
Dept: PHYSICAL THERAPY | Age: 80
Setting detail: RECURRING SERIES
Discharge: HOME | End: 2025-05-30
Attending: INTERNAL MEDICINE
Payer: MEDICARE

## 2025-05-27 ENCOUNTER — HOSPITAL ENCOUNTER (OUTPATIENT)
Dept: LAB | Age: 80
Discharge: HOME | End: 2025-05-27
Payer: MEDICARE

## 2025-05-27 VITALS
HEIGHT: 63 IN | WEIGHT: 174.4 LBS | OXYGEN SATURATION: 95 % | RESPIRATION RATE: 18 BRPM | DIASTOLIC BLOOD PRESSURE: 78 MMHG | BODY MASS INDEX: 30.9 KG/M2 | HEART RATE: 98 BPM | SYSTOLIC BLOOD PRESSURE: 162 MMHG | TEMPERATURE: 70 F

## 2025-05-27 DIAGNOSIS — D70.1 CHEMOTHERAPY-INDUCED NEUTROPENIA: ICD-10-CM

## 2025-05-27 DIAGNOSIS — Z79.899 HIGH RISK MEDICATION USE: ICD-10-CM

## 2025-05-27 DIAGNOSIS — Z51.11 ENCOUNTER FOR ANTINEOPLASTIC CHEMOTHERAPY: ICD-10-CM

## 2025-05-27 DIAGNOSIS — T45.1X5A CHEMOTHERAPY-INDUCED NEUTROPENIA: ICD-10-CM

## 2025-05-27 DIAGNOSIS — C91.Z0 T-CELL LARGE GRANULAR LYMPHOCYTIC LEUKEMIA (HCC): ICD-10-CM

## 2025-05-27 DIAGNOSIS — C91.Z0 T-CELL LARGE GRANULAR LYMPHOCYTIC LEUKEMIA (HCC): Primary | ICD-10-CM

## 2025-05-27 LAB
ALBUMIN SERPL-MCNC: 3.5 G/DL (ref 3.2–4.6)
ALBUMIN/GLOB SERPL: 0.9 (ref 1–1.9)
ALP SERPL-CCNC: 65 U/L (ref 35–104)
ALT SERPL-CCNC: 15 U/L (ref 8–45)
ANION GAP SERPL CALC-SCNC: 10 MMOL/L (ref 7–16)
AST SERPL-CCNC: 20 U/L (ref 15–37)
BASOPHILS # BLD: 0.02 K/UL (ref 0–0.2)
BASOPHILS NFR BLD: 0.6 % (ref 0–2)
BILIRUB SERPL-MCNC: 0.4 MG/DL (ref 0–1.2)
BUN SERPL-MCNC: 15 MG/DL (ref 8–23)
CALCIUM SERPL-MCNC: 9.4 MG/DL (ref 8.8–10.2)
CHLORIDE SERPL-SCNC: 104 MMOL/L (ref 98–107)
CO2 SERPL-SCNC: 26 MMOL/L (ref 20–29)
CREAT SERPL-MCNC: 0.65 MG/DL (ref 0.6–1.1)
DIFFERENTIAL METHOD BLD: ABNORMAL
EOSINOPHIL # BLD: 0.08 K/UL (ref 0–0.8)
EOSINOPHIL NFR BLD: 2.5 % (ref 0.5–7.8)
ERYTHROCYTE [DISTWIDTH] IN BLOOD BY AUTOMATED COUNT: 14.6 % (ref 11.9–14.6)
GLOBULIN SER CALC-MCNC: 3.9 G/DL (ref 2.3–3.5)
GLUCOSE SERPL-MCNC: 99 MG/DL (ref 70–99)
HCT VFR BLD AUTO: 39.5 % (ref 35.8–46.3)
HGB BLD-MCNC: 12.6 G/DL (ref 11.7–15.4)
IMM GRANULOCYTES # BLD AUTO: 0 K/UL (ref 0–0.5)
IMM GRANULOCYTES NFR BLD AUTO: 0 % (ref 0–5)
LYMPHOCYTES # BLD: 2.33 K/UL (ref 0.5–4.6)
LYMPHOCYTES NFR BLD: 72.8 % (ref 13–44)
MAGNESIUM SERPL-MCNC: 2.2 MG/DL (ref 1.8–2.4)
MCH RBC QN AUTO: 30 PG (ref 26.1–32.9)
MCHC RBC AUTO-ENTMCNC: 31.9 G/DL (ref 31.4–35)
MCV RBC AUTO: 94 FL (ref 82–102)
MONOCYTES # BLD: 0.35 K/UL (ref 0.1–1.3)
MONOCYTES NFR BLD: 10.9 % (ref 4–12)
NEUTS SEG # BLD: 0.42 K/UL (ref 1.7–8.2)
NEUTS SEG NFR BLD: 13.2 % (ref 43–78)
NRBC # BLD: 0 K/UL (ref 0–0.2)
PLATELET # BLD AUTO: 163 K/UL (ref 150–450)
PMV BLD AUTO: 9.9 FL (ref 9.4–12.3)
POTASSIUM SERPL-SCNC: 4.1 MMOL/L (ref 3.5–5.1)
PROT SERPL-MCNC: 7.4 G/DL (ref 6.3–8.2)
RBC # BLD AUTO: 4.2 M/UL (ref 4.05–5.2)
SODIUM SERPL-SCNC: 140 MMOL/L (ref 136–145)
WBC # BLD AUTO: 3.2 K/UL (ref 4.3–11.1)

## 2025-05-27 PROCEDURE — 83735 ASSAY OF MAGNESIUM: CPT

## 2025-05-27 PROCEDURE — 97110 THERAPEUTIC EXERCISES: CPT

## 2025-05-27 PROCEDURE — 1123F ACP DISCUSS/DSCN MKR DOCD: CPT | Performed by: INTERNAL MEDICINE

## 2025-05-27 PROCEDURE — 99215 OFFICE O/P EST HI 40 MIN: CPT | Performed by: INTERNAL MEDICINE

## 2025-05-27 PROCEDURE — 36415 COLL VENOUS BLD VENIPUNCTURE: CPT

## 2025-05-27 PROCEDURE — 1126F AMNT PAIN NOTED NONE PRSNT: CPT | Performed by: INTERNAL MEDICINE

## 2025-05-27 PROCEDURE — 85025 COMPLETE CBC W/AUTO DIFF WBC: CPT

## 2025-05-27 PROCEDURE — 1160F RVW MEDS BY RX/DR IN RCRD: CPT | Performed by: INTERNAL MEDICINE

## 2025-05-27 PROCEDURE — 80053 COMPREHEN METABOLIC PANEL: CPT

## 2025-05-27 PROCEDURE — 1159F MED LIST DOCD IN RCRD: CPT | Performed by: INTERNAL MEDICINE

## 2025-05-27 RX ORDER — METHOTREXATE 2.5 MG/1
15 TABLET ORAL WEEKLY
Qty: 30 TABLET | Refills: 0 | Status: SHIPPED | OUTPATIENT
Start: 2025-05-27

## 2025-05-27 ASSESSMENT — PAIN SCALES - GENERAL: PAINLEVEL_OUTOF10: 1

## 2025-05-27 NOTE — PROGRESS NOTES
Most nights [facility] Hematology & Oncology: Office Visit Progress Note    Chief Complaint:    Neutropenia  T-cell LGL leukemia/Felty syndrome    History of Present Illness:  Referral Diagnosis: Other neutropenia; Seropositive rheumatoid arthritis     Referring Provider:  Cammy Maloney MD     Primary Care Provider: Jp Tinsley DO     Family History of Cancer/ Hematology Disorders: Mother with colon cancer     Presenting Symptoms:  Fatigue, fever, arthralgias     Ms. Jaquez is a 79 y.o. white female referred for neutropenia. PMH significant for MS (diagnosed in 1977- no treatment), seropositive rheumatoid arthritis, osteoporosis, hearing loss, and ovarian cancer s/p hysterectomy ~ 1997.      9/25/24: Rheumatology f/u for seropositive rheumatoid arthritis (diagnosed in 2020) and osteoporosis. Pt has not used DMARD therapy before. RA is being treated with prednisone (currently completing steroid pack which started at 60 Mg and found that any dose less than 30 Mg does not control symptoms). Prolia was previously used x 2 doses ending in 2022 for osteoporosis. Pt is counseled to consider restarting Prolia due to severity of her osteoporosis.   -Plan for methotrexate 10-12.5 Mg weekly plus daily folic acid based on labs.  -IM Depo-Medrol 40 Mg given once now. Consider PO pred if indicated for symptos  -Laboratory results showed WBC 1.7, neutrophils 14, lymphocytes 66, monocytes 17, ANC 0.2; sed rate 49, CRP WNL; hepatitis panel nonreactive; Mag, phos, vit D, calcium, PTH intact, incubated quantiferon WNL; CO2 19, glucose 103, globulin 4.1, A/G ratio 0.8 (Epic/Labs)      9/26/24: Dr. Maloney called pt's daughter regarding abnormal neutrophil count - , wbc 1.7; she was advised pt to go to er/UTC if fever lasting +1 hour or if infection to have treatment administered.  -Abdominal US ordered to check for splenomegaly (Scheduled for 10/3/24)   -Referred to [facility]  -Review of records indicates that pt's white

## 2025-05-27 NOTE — PATIENT INSTRUCTIONS
Anion Gap 05/27/2025 10  7 - 16 mmol/L Final    Glucose 05/27/2025 99  70 - 99 mg/dL Final    Comment: <70 mg/dL Consistent with, but not fully diagnostic of hypoglycemia.  100 - 125 mg/dL Impaired fasting glucose/consistent with pre-diabetes mellitus.  > 126 mg/dl Fasting glucose consistent with overt diabetes mellitus      BUN 05/27/2025 15  8 - 23 MG/DL Final    Creatinine 05/27/2025 0.65  0.60 - 1.10 MG/DL Final    Est, Glom Filt Rate 05/27/2025 89  >60 ml/min/1.73m2 Final    Comment:    Pediatric calculator link: https://www.kidney.org/professionals/kdoqi/gfr_calculatorped     These results are not intended for use in patients <18 years of age.     eGFR results are calculated without a race factor using  the 2021 CKD-EPI equation. Careful clinical correlation is recommended, particularly when comparing to results calculated using previous equations.  The CKD-EPI equation is less accurate in patients with extremes of muscle mass, extra-renal metabolism of creatinine, excessive creatine ingestion, or following therapy that affects renal tubular secretion.      Calcium 05/27/2025 9.4  8.8 - 10.2 MG/DL Final    Total Bilirubin 05/27/2025 0.4  0.0 - 1.2 MG/DL Final    ALT 05/27/2025 15  8 - 45 U/L Final    AST 05/27/2025 20  15 - 37 U/L Final    Alk Phosphatase 05/27/2025 65  35 - 104 U/L Final    Total Protein 05/27/2025 7.4  6.3 - 8.2 g/dL Final    Albumin 05/27/2025 3.5  3.2 - 4.6 g/dL Final    Globulin 05/27/2025 3.9 (H)  2.3 - 3.5 g/dL Final    Albumin/Globulin Ratio 05/27/2025 0.9 (L)  1.0 - 1.9   Final                 Please refer to After Visit Summary or [employer] for upcoming appointment information. If you have any questions regarding your upcoming schedule please reach out to your care team through [employer] or call [phone].    Please notify your assigned Nurse Navigator of any unplanned hospital admissions or Emergency Room visits within 24 hours of

## 2025-05-28 ENCOUNTER — OFFICE VISIT (OUTPATIENT)
Age: 80
End: 2025-05-28
Payer: MEDICARE

## 2025-05-28 DIAGNOSIS — M05.79 RHEUMATOID ARTHRITIS INVOLVING MULTIPLE SITES WITH POSITIVE RHEUMATOID FACTOR (HCC): ICD-10-CM

## 2025-05-28 PROCEDURE — 1159F MED LIST DOCD IN RCRD: CPT | Performed by: PHYSICIAN ASSISTANT

## 2025-05-28 PROCEDURE — 99214 OFFICE O/P EST MOD 30 MIN: CPT | Performed by: PHYSICIAN ASSISTANT

## 2025-05-28 PROCEDURE — 1123F ACP DISCUSS/DSCN MKR DOCD: CPT | Performed by: PHYSICIAN ASSISTANT

## 2025-05-28 RX ORDER — HYDROCODONE BITARTRATE AND ACETAMINOPHEN 7.5; 325 MG/1; MG/1
1 TABLET ORAL EVERY 6 HOURS PRN
Qty: 120 TABLET | Refills: 0 | Status: SHIPPED | OUTPATIENT
Start: 2025-07-10 | End: 2025-08-09

## 2025-05-28 RX ORDER — HYDROCODONE BITARTRATE AND ACETAMINOPHEN 7.5; 325 MG/1; MG/1
1 TABLET ORAL EVERY 6 HOURS PRN
Qty: 120 TABLET | Refills: 0 | Status: SHIPPED | OUTPATIENT
Start: 2025-06-10 | End: 2025-07-10

## 2025-05-28 ASSESSMENT — ENCOUNTER SYMPTOMS
ABDOMINAL PAIN: 0
EYES NEGATIVE: 1
SHORTNESS OF BREATH: 0
ALLERGIC/IMMUNOLOGIC NEGATIVE: 1

## 2025-05-28 NOTE — PROGRESS NOTES
Mrs. Gallaghermyrtle is a 2-month follow-up for chronic pain related to rheumatoid arthritis as well as neck pain.

## 2025-05-28 NOTE — PROGRESS NOTES
Chronic Pain Consult Note      Plan:    A comprehensive pain management plan may consist of the following: Testing, Therapy, Medications, Interventions, Consults, and Follow up.    Rheumatoid Arthritis  Will be seeing Dr Monaco in near future to establish care.   Prednisone has been discontinued.   Bone density scan showed T-scores ranging from -1.6 in the lumbar spine to -2.7 in the left femoral neck.  Her risk of a major osteoporotic fracture is 49.1%.  Now is on Prolia.   T-Cell Large Granular Lymphocytic Leukemia   Started on MTX recently which seems to help.   Cervical spine pain  Xray order is available if she needs it.   Discussed continuing PT exercises.   Chronic Pain Syndrome  Reviewed UDS 10/23/24 - appropriate  Obtain CSP 10/23/24. Expectations of pain management discussed including no sharing of meds, taking as prescribed and bring pill bottles to follow up.   Continue Norco 7.5mg QID. Medicine is due 6/10 and 7/10.       Was medication brought in? Yes  Medication Norco 7.5mg  Last Filled 5/11/25  Last Dose 5/8/25 morning  Remaining 59      General Recommendations: The pain condition that the patient suffers from is best treated with a multidisciplinary approach that involves an increase in physical activity to prevent de-conditioning and worsening of the pain cycle, as well as psychological counseling (formal and/or informal) to address the co morbid psychological effects of pain. Treatment will often involve judicious use of pain medications and interventional procedures to decrease the pain, allowing the patient to participate in the physical activity that will ultimately produce long-lasting pain reductions. The goal of the multidisciplinary approach is to return the patient to a higher level of overall function and to restore their ability to perform activities of daily living.    Narcan nasal spray was prescribed on:    No orders of the defined types were placed in this encounter.    Requested

## 2025-06-17 DIAGNOSIS — C91.Z0 T-CELL LARGE GRANULAR LYMPHOCYTIC LEUKEMIA (HCC): Primary | ICD-10-CM

## 2025-06-19 ENCOUNTER — CLINICAL SUPPORT (OUTPATIENT)
Dept: RHEUMATOLOGY | Age: 80
End: 2025-06-19
Payer: MEDICARE

## 2025-06-19 ENCOUNTER — OFFICE VISIT (OUTPATIENT)
Dept: RHEUMATOLOGY | Age: 80
End: 2025-06-19
Payer: MEDICARE

## 2025-06-19 VITALS
SYSTOLIC BLOOD PRESSURE: 112 MMHG | WEIGHT: 173.4 LBS | HEART RATE: 74 BPM | DIASTOLIC BLOOD PRESSURE: 64 MMHG | HEIGHT: 63 IN | OXYGEN SATURATION: 98 % | BODY MASS INDEX: 30.72 KG/M2

## 2025-06-19 VITALS — DIASTOLIC BLOOD PRESSURE: 64 MMHG | SYSTOLIC BLOOD PRESSURE: 112 MMHG | TEMPERATURE: 97.4 F | HEART RATE: 74 BPM

## 2025-06-19 DIAGNOSIS — Z79.899 LONG-TERM USE OF HIGH-RISK MEDICATION: ICD-10-CM

## 2025-06-19 DIAGNOSIS — M81.0 POSTMENOPAUSAL OSTEOPOROSIS: Primary | ICD-10-CM

## 2025-06-19 DIAGNOSIS — E55.9 VITAMIN D DEFICIENCY: ICD-10-CM

## 2025-06-19 DIAGNOSIS — M05.9 SEROPOSITIVE RHEUMATOID ARTHRITIS (HCC): Primary | ICD-10-CM

## 2025-06-19 PROCEDURE — 1123F ACP DISCUSS/DSCN MKR DOCD: CPT | Performed by: INTERNAL MEDICINE

## 2025-06-19 PROCEDURE — 99215 OFFICE O/P EST HI 40 MIN: CPT | Performed by: INTERNAL MEDICINE

## 2025-06-19 PROCEDURE — 96372 THER/PROPH/DIAG INJ SC/IM: CPT | Performed by: INTERNAL MEDICINE

## 2025-06-19 RX ORDER — ACETAMINOPHEN 325 MG/1
650 TABLET ORAL
Status: DISCONTINUED | OUTPATIENT
Start: 2025-06-19 | End: 2025-06-19 | Stop reason: HOSPADM

## 2025-06-19 RX ORDER — DIPHENHYDRAMINE HYDROCHLORIDE 50 MG/ML
50 INJECTION, SOLUTION INTRAMUSCULAR; INTRAVENOUS
Status: DISCONTINUED | OUTPATIENT
Start: 2025-06-19 | End: 2025-06-19 | Stop reason: HOSPADM

## 2025-06-19 RX ORDER — HYDROCORTISONE SODIUM SUCCINATE 100 MG/2ML
100 INJECTION INTRAMUSCULAR; INTRAVENOUS
Status: DISCONTINUED | OUTPATIENT
Start: 2025-06-19 | End: 2025-06-19 | Stop reason: HOSPADM

## 2025-06-19 RX ORDER — ALBUTEROL SULFATE 90 UG/1
4 INHALANT RESPIRATORY (INHALATION) PRN
Status: DISCONTINUED | OUTPATIENT
Start: 2025-06-19 | End: 2025-06-19 | Stop reason: HOSPADM

## 2025-06-19 RX ORDER — FAMOTIDINE 10 MG/ML
20 INJECTION, SOLUTION INTRAVENOUS
Status: DISCONTINUED | OUTPATIENT
Start: 2025-06-19 | End: 2025-06-19 | Stop reason: HOSPADM

## 2025-06-19 RX ORDER — EPINEPHRINE 1 MG/ML
0.3 INJECTION, SOLUTION, CONCENTRATE INTRAVENOUS PRN
Status: DISCONTINUED | OUTPATIENT
Start: 2025-06-19 | End: 2025-06-19 | Stop reason: HOSPADM

## 2025-06-19 RX ORDER — ONDANSETRON 2 MG/ML
8 INJECTION INTRAMUSCULAR; INTRAVENOUS
Status: DISCONTINUED | OUTPATIENT
Start: 2025-06-19 | End: 2025-06-19 | Stop reason: HOSPADM

## 2025-06-19 RX ORDER — DIPHENHYDRAMINE HYDROCHLORIDE 50 MG/ML
50 INJECTION, SOLUTION INTRAMUSCULAR; INTRAVENOUS
OUTPATIENT
Start: 2025-12-18

## 2025-06-19 RX ORDER — SODIUM CHLORIDE 9 MG/ML
INJECTION, SOLUTION INTRAVENOUS CONTINUOUS
Status: DISCONTINUED | OUTPATIENT
Start: 2025-06-19 | End: 2025-06-19 | Stop reason: HOSPADM

## 2025-06-19 RX ORDER — SODIUM CHLORIDE 9 MG/ML
INJECTION, SOLUTION INTRAVENOUS CONTINUOUS
OUTPATIENT
Start: 2025-12-18

## 2025-06-19 RX ORDER — HYDROCORTISONE SODIUM SUCCINATE 100 MG/2ML
100 INJECTION INTRAMUSCULAR; INTRAVENOUS
OUTPATIENT
Start: 2025-12-18

## 2025-06-19 RX ORDER — ONDANSETRON 2 MG/ML
8 INJECTION INTRAMUSCULAR; INTRAVENOUS
OUTPATIENT
Start: 2025-12-18

## 2025-06-19 RX ORDER — ACETAMINOPHEN 325 MG/1
650 TABLET ORAL
OUTPATIENT
Start: 2025-12-18

## 2025-06-19 RX ORDER — FAMOTIDINE 10 MG/ML
20 INJECTION, SOLUTION INTRAVENOUS
OUTPATIENT
Start: 2025-12-18

## 2025-06-19 RX ORDER — ALBUTEROL SULFATE 90 UG/1
4 INHALANT RESPIRATORY (INHALATION) PRN
OUTPATIENT
Start: 2025-12-18

## 2025-06-19 RX ORDER — EPINEPHRINE 1 MG/ML
0.3 INJECTION, SOLUTION, CONCENTRATE INTRAVENOUS PRN
OUTPATIENT
Start: 2025-12-18

## 2025-06-19 ASSESSMENT — ROUTINE ASSESSMENT OF PATIENT INDEX DATA (RAPID3)
ON A SCALE OF ONE TO TEN, CONSIDERING ALL THE WAYS IN WHICH ILLNESS AND HEALTH CONDITIONS MAY AFFECT YOU AT THIS TIME, PLEASE INDICATE BELOW HOW YOU ARE DOING:: 5
WHEN YOU AWAKENED IN THE MORNING OVER THE LAST WEEK, PLEASE INDICATE THE AMOUNT OF TIME IT TAKES UNTIL YOU ARE AS LIMBER AS YOU WILL BE FOR THE DAY: > 1 HOUR
ON A SCALE OF ONE TO TEN, HOW DIFFICULT WAS IT FOR YOU TO COMPLETE THE LISTED DAILY PHYSICAL TASKS OVER THE LAST WEEK: 1.0
ON A SCALE OF ONE TO TEN, HOW MUCH PAIN HAVE YOU HAD BECAUSE OF YOUR CONDITION OVER THE PAST WEEK?: 5
ON A SCALE OF ONE TO TEN, HOW MUCH OF A PROBLEM HAS UNUSUAL FATIGUE OR TIREDNESS BEEN FOR YOU OVER THE PAST WEEK?: 4

## 2025-06-19 ASSESSMENT — RHEUMATOLOGY NEW PATIENT QUESTIONNAIRE
SWOLLEN OR TENDER GLANDS: N
MEMORY LOSS: N
MORNING STIFFNESS: Y
COLOR CHANGES OF HANDS OR FEET IN THE COLD: N
RASH OR ULCERS: N
VAGINAL DRYNESS: N
HEADACHES: N
MUSCLE WEAKNESS: N
LOSS OF VISION: Y
EXCESSIVE HAIR LOSS (MORE THAN YOUR NORM): N
BEHAVIORAL CHANGES: N
STOMACH PAIN: N
DIFFICULTY STAYING ASLEEP: Y
ANXIETY: Y
EYE PAIN: N
EYE DRYNESS: Y
LOSS OF CONSCIOUSNESS: N
SUN SENSITIVE (SUN ALLERGY): N
UNEXPLAINED HEARING LOSS: N
DOUBLE OR BLURRED VISION: Y
ANEMIA: N
NIGHT SWEATS: N
DEPRESSION: N
SHORTNESS OF BREATH: N
FAINTING: N
UNUSUAL BLEEDING: N
JOINT SWELLING: Y
SORES IN MOUTH OR NOSE: N
DRYNESS OF MOUTH: N
JOINT PAIN: Y
INCREASED SUSCEPTIBILITY TO INFECTION: Y
UNUSUAL FATIGUE: N
JAUNDICE: N
BLACK STOOLS: N
COUGH: N
PERSISTENT DIARRHEA: N
SKIN REDNESS: N
DIFFICULTY SWALLOWING: N
RASH: N
HEARTBURN OR REFLUX: N
BLOOD IN STOOLS: N
SWOLLEN LEGS OR FEET: N
AGITATION: N
PAIN OR BURNING ON URINATION: N
HOW WOULD YOU DESCRIBE YOUR STIFFNESS ON AVERAGE: MODERATE
FEVER: N
DIFFICULTY FALLING ASLEEP: N
EASILY LOSING TEMPER: N
ABNORMAL URINE: N
NODULES/BUMPS: N
VOMITING OF BLOOD OR COFFEE GROUND CONSISTENCY MATERIAL: N
EYE REDNESS: N
UNUSUALLY RAPID OR SLOWED HEART RATE: N
SEIZURES: N
UNEXPLAINED WEIGHT CHANGE: N
SKIN TIGHTNESS: N
NUMBNESS OR TINGLING IN HANDS OR FEET: N
MORNING STIFFNESS IN LOWER BACK: Y
DIFFICULTY BREATHING LYING DOWN: N
HOARSE VOICE: N
CHEST PAIN: N

## 2025-06-19 NOTE — PATIENT INSTRUCTIONS
Treatment Plan:  Continue methotrexate at 6 tablets weekly and folic acid at the current dose.  Use prednisone intermittently for flare-ups, starting at 50 mg for 3 days, tapering to 10 mg and 5 mg.  Labs to check inflammation markers and vitamin D levels.  consider Cytoxan if the condition does not improve.  Reduce calcium citrate to 1 tablet daily and increase dietary calcium intake.  Continue vitamin D supplements.  Prolia injection scheduled for today.     Follow-Up Instructions:  Scheduled to see Dr. Hinds on Tuesday.  Increase dietary calcium intake and reduce calcium citrate to 1 tablet daily.  Continue vitamin D supplements.  Repeat bone density scan in November.  Contact Dr. Hinds to coordinate lab tests for inflammation markers and vitamin D levels.

## 2025-06-19 NOTE — PROGRESS NOTES
[facility] Rheumatology  [address] [facility] , [address]   [place], [place]  Office : [phone], Fax: [phone]     RHEUMATOLOGY OFFICE VISIT NOTE  Date of Visit:  6/19/2025 11:33 AM    [facility] [phone]     SUBJECTIVE:   Edelmira Jaquez 79 y.o. presents for follow up of:  Seropositive rheumatoid arthritis              MTX 20 mg weekly started end Oct 2024  LGL - Dx 2024    Osteoporosis  Risk factors include ovarian cancer status post hysterectomy less than 45   R ankle fracture low trauma - Sept 2023    calcium citrate 600 Mg twice daily and vitamin D 2000 units daily    Prolia    Prior tx:   started on Prolia on 9-2-2021, #2 2022 off tx for several years    DXA   11/2024  (L1-L4): 0.992, T-score -1.6   Left Femoral Neck: 0.664, T-score -2.7  Right Femoral Neck: 0.730, T-score -2.2   major osteoporotic: 49.1%, hip: 22.1%      PMH:  Ovarian cancer s/p hysterectomy  ~1997  Multiple sclerosis Dx in 1977 - no treatment  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 Hx of cataract surgery bilaterally         Last seen 12/11/2024     History of Present Illness  Since last visit patient has been feeling: Good       rheumatoid arthritis. The condition has been managed with methotrexate since October 2024, with the dosage reduced from 8 to 6 tablets weekly 2-3 months ago due to fluctuating neutrophil and white blood cell counts.    Seen by Dr. Hinds 5/27/2025 absolute neutrophil count reduced to 0.4 felt secondary to methotrexate or LGL leukemia.  Advised to reduce methotrexate to 15 mg weekly.  Also discussed either doing another course of high-dose prednisone versus changing to Cytoxan but the patient feels okay on current therapy.    Prednisone, 15 mg intermittently, provides more relief than pain medication, particularly for stiffness and mobility. It is used for severe flare-ups every 3-4 months, starting at 50 mg for 3 days, tapering to 10 mg and 5 mg. The

## 2025-06-19 NOTE — PROGRESS NOTES
[facility] RHEUMATOLOGY  [address] [place] [address], [address]  [place] [address]  Office : [phone], Fax: [phone]       # 2 Prolia 60mg/ml injected SC today into right arm. Patient tolerated injection well. Advised patient to continue with calcium and vitamin D post injection. Advised patient to call with any problems post injection.   Medication ordered by Dr. Monaco    Patient Medication Supplied? no  Patient discharged feeling well and instructed to call the office with any issues.    Pre-infusion/injection questionairre for osteoporosis    1. Have you had or are you planning any dental work? No    2. Are you taking your calcium and vitamin D? Yes    3. When was your last osteoporosis treatment? 12/11/24    4. Have you had any recent fractures? No    5. Are you currently in skilled nursing or in patient rehab? No

## 2025-06-19 NOTE — PROGRESS NOTES
Since last visit patient has been feeling: Good     Any Rheumatology medication side effects: None        Hospitalizations: None         Infections:  None      Vaccinations: None        6/19/2025    11:00 AM   DMARD/Biologic   AM Stiffness > 1 hour   Pain 5   Fatigue 4   MDHAQ 1   Patient Global Score 5   Medication Name Methotrexate          REVIEW OF SYSTEMS:  A total of 10 systems (musculoskeletal system as stated in the HPI and the following 9 systems) were reviewed with patient today and were negative except as stated in the HPI and except for the following (depicted with an \"X\"):        \"X\" Constitutional  \"X\" HEENT /Mouth  \"X\" Cardiovascular and  Respiratory (2 systems)  \"X\" Gastrointestinal    Fever/chills   Hair loss   Shortness of breath  x Upset stomach    Falls   Dry mouth   Coughing   Diarrhea / constipation    Wt loss   Mouth sores   Wheezing   Heartburn    Wt gain  x Ringing ears   Chest pain   Dark or bloody stools   x Night sweats   Diff. swallowing  X None of above   Nausea or vomiting    None of above   None of above      None of above                \"X\" Integumentary  \"X\" Neurological  \"X\" Genitourinary  \"X\" Psychiatric    Easy bruising  x Numbness/ tingling   Female problems   Depression    Rashes  x Weakness   Problems with urination   Feeling anxious   x Sun sensitivity   Headaches  X None of above  x Problems sleeping    None of above   None of above      None of above

## 2025-06-24 ENCOUNTER — HOSPITAL ENCOUNTER (OUTPATIENT)
Dept: LAB | Age: 80
Discharge: HOME | End: 2025-06-24
Payer: MEDICARE

## 2025-06-24 ENCOUNTER — OFFICE VISIT (OUTPATIENT)
Dept: ONCOLOGY | Age: 80
End: 2025-06-24
Payer: MEDICARE

## 2025-06-24 VITALS
SYSTOLIC BLOOD PRESSURE: 152 MMHG | RESPIRATION RATE: 18 BRPM | TEMPERATURE: 97.5 F | HEART RATE: 66 BPM | WEIGHT: 173.7 LBS | BODY MASS INDEX: 30.78 KG/M2 | OXYGEN SATURATION: 95 % | HEIGHT: 63 IN | DIASTOLIC BLOOD PRESSURE: 77 MMHG

## 2025-06-24 DIAGNOSIS — C91.Z0 T-CELL LARGE GRANULAR LYMPHOCYTIC LEUKEMIA (HCC): Primary | ICD-10-CM

## 2025-06-24 DIAGNOSIS — Z79.899 HIGH RISK MEDICATION USE: ICD-10-CM

## 2025-06-24 DIAGNOSIS — D70.1 CHEMOTHERAPY-INDUCED NEUTROPENIA: ICD-10-CM

## 2025-06-24 DIAGNOSIS — C91.Z0 T-CELL LARGE GRANULAR LYMPHOCYTIC LEUKEMIA (HCC): ICD-10-CM

## 2025-06-24 DIAGNOSIS — Z51.11 ENCOUNTER FOR ANTINEOPLASTIC CHEMOTHERAPY: ICD-10-CM

## 2025-06-24 DIAGNOSIS — D70.9 NEUTROPENIA, UNSPECIFIED TYPE: ICD-10-CM

## 2025-06-24 DIAGNOSIS — T45.1X5A CHEMOTHERAPY-INDUCED NEUTROPENIA: ICD-10-CM

## 2025-06-24 LAB
ALBUMIN SERPL-MCNC: 3.3 G/DL (ref 3.2–4.6)
ALBUMIN/GLOB SERPL: 0.8 (ref 1–1.9)
ALP SERPL-CCNC: 65 U/L (ref 35–104)
ALT SERPL-CCNC: 12 U/L (ref 8–45)
ANION GAP SERPL CALC-SCNC: 12 MMOL/L (ref 7–16)
AST SERPL-CCNC: 21 U/L (ref 15–37)
BASOPHILS # BLD: 0.02 K/UL (ref 0–0.2)
BASOPHILS NFR BLD: 0.7 % (ref 0–2)
BILIRUB SERPL-MCNC: 0.3 MG/DL (ref 0–1.2)
BUN SERPL-MCNC: 14 MG/DL (ref 8–23)
CALCIUM SERPL-MCNC: 9.3 MG/DL (ref 8.8–10.2)
CHLORIDE SERPL-SCNC: 105 MMOL/L (ref 98–107)
CO2 SERPL-SCNC: 23 MMOL/L (ref 20–29)
CREAT SERPL-MCNC: 0.72 MG/DL (ref 0.6–1.1)
DIFFERENTIAL METHOD BLD: ABNORMAL
EOSINOPHIL # BLD: 0.06 K/UL (ref 0–0.8)
EOSINOPHIL NFR BLD: 2.1 % (ref 0.5–7.8)
ERYTHROCYTE [DISTWIDTH] IN BLOOD BY AUTOMATED COUNT: 15 % (ref 11.9–14.6)
GLOBULIN SER CALC-MCNC: 3.9 G/DL (ref 2.3–3.5)
GLUCOSE SERPL-MCNC: 124 MG/DL (ref 70–99)
HCT VFR BLD AUTO: 38.5 % (ref 35.8–46.3)
HGB BLD-MCNC: 12.3 G/DL (ref 11.7–15.4)
IMM GRANULOCYTES # BLD AUTO: 0.01 K/UL (ref 0–0.5)
IMM GRANULOCYTES NFR BLD AUTO: 0.4 % (ref 0–5)
LYMPHOCYTES # BLD: 1.84 K/UL (ref 0.5–4.6)
LYMPHOCYTES NFR BLD: 65.5 % (ref 13–44)
MCH RBC QN AUTO: 30.4 PG (ref 26.1–32.9)
MCHC RBC AUTO-ENTMCNC: 31.9 G/DL (ref 31.4–35)
MCV RBC AUTO: 95.1 FL (ref 82–102)
MONOCYTES # BLD: 0.34 K/UL (ref 0.1–1.3)
MONOCYTES NFR BLD: 12.1 % (ref 4–12)
NEUTS SEG # BLD: 0.54 K/UL (ref 1.7–8.2)
NEUTS SEG NFR BLD: 19.2 % (ref 43–78)
NRBC # BLD: 0 K/UL (ref 0–0.2)
PLATELET # BLD AUTO: 172 K/UL (ref 150–450)
PMV BLD AUTO: 9.8 FL (ref 9.4–12.3)
POTASSIUM SERPL-SCNC: 3.8 MMOL/L (ref 3.5–5.1)
PROT SERPL-MCNC: 7.2 G/DL (ref 6.3–8.2)
RBC # BLD AUTO: 4.05 M/UL (ref 4.05–5.2)
SODIUM SERPL-SCNC: 140 MMOL/L (ref 136–145)
WBC # BLD AUTO: 2.8 K/UL (ref 4.3–11.1)

## 2025-06-24 PROCEDURE — 99215 OFFICE O/P EST HI 40 MIN: CPT | Performed by: INTERNAL MEDICINE

## 2025-06-24 PROCEDURE — 85025 COMPLETE CBC W/AUTO DIFF WBC: CPT

## 2025-06-24 PROCEDURE — 1159F MED LIST DOCD IN RCRD: CPT | Performed by: INTERNAL MEDICINE

## 2025-06-24 PROCEDURE — 36415 COLL VENOUS BLD VENIPUNCTURE: CPT

## 2025-06-24 PROCEDURE — 1160F RVW MEDS BY RX/DR IN RCRD: CPT | Performed by: INTERNAL MEDICINE

## 2025-06-24 PROCEDURE — 1126F AMNT PAIN NOTED NONE PRSNT: CPT | Performed by: INTERNAL MEDICINE

## 2025-06-24 PROCEDURE — 1123F ACP DISCUSS/DSCN MKR DOCD: CPT | Performed by: INTERNAL MEDICINE

## 2025-06-24 PROCEDURE — 80053 COMPREHEN METABOLIC PANEL: CPT

## 2025-06-24 RX ORDER — METHOTREXATE 2.5 MG/1
15 TABLET ORAL WEEKLY
Qty: 30 TABLET | Refills: 5 | Status: SHIPPED | OUTPATIENT
Start: 2025-06-24

## 2025-06-24 NOTE — PROGRESS NOTES
Most nights [facility] Hematology & Oncology: Office Visit Progress Note    Chief Complaint:    Neutropenia  T-cell LGL leukemia/Felty syndrome    History of Present Illness:  Referral Diagnosis: Other neutropenia; Seropositive rheumatoid arthritis     Referring Provider:  Cammy Maloney MD     Primary Care Provider: Jp Tinsley DO     Family History of Cancer/ Hematology Disorders: Mother with colon cancer     Presenting Symptoms:  Fatigue, fever, arthralgias     Ms. Jaquez is a 79 y.o. white female referred for neutropenia. PMH significant for MS (diagnosed in 1977- no treatment), seropositive rheumatoid arthritis, osteoporosis, hearing loss, and ovarian cancer s/p hysterectomy ~ 1997.      9/25/24: Rheumatology f/u for seropositive rheumatoid arthritis (diagnosed in 2020) and osteoporosis. Pt has not used DMARD therapy before. RA is being treated with prednisone (currently completing steroid pack which started at 60 Mg and found that any dose less than 30 Mg does not control symptoms). Prolia was previously used x 2 doses ending in 2022 for osteoporosis. Pt is counseled to consider restarting Prolia due to severity of her osteoporosis.   -Plan for methotrexate 10-12.5 Mg weekly plus daily folic acid based on labs.  -IM Depo-Medrol 40 Mg given once now. Consider PO pred if indicated for symptos  -Laboratory results showed WBC 1.7, neutrophils 14, lymphocytes 66, monocytes 17, ANC 0.2; sed rate 49, CRP WNL; hepatitis panel nonreactive; Mag, phos, vit D, calcium, PTH intact, incubated quantiferon WNL; CO2 19, glucose 103, globulin 4.1, A/G ratio 0.8 (Epic/Labs)      9/26/24: Dr. Maloney called pt's daughter regarding abnormal neutrophil count - , wbc 1.7; she was advised pt to go to er/UTC if fever lasting +1 hour or if infection to have treatment administered.  -Abdominal US ordered to check for splenomegaly (Scheduled for 10/3/24)   -Referred to [facility]  -Review of records indicates that pt's white

## 2025-06-24 NOTE — PATIENT INSTRUCTIONS
Patient Information from Today's Visit    The members of your [facility] are listed below:    Physician Provider: Job Hinds Medical Oncologist  Advanced Practice Clinician: Nat Eubanks NP  Registered Nurse:   Nurse Navigator:   Medical Assistant: Mary Grace MATTHEW CMA  :Yvrose BANEGAS  Supportive Care Services: Byron THOMAS LMSW    Diagnosis (Information Sheet Provided on Day of Diagnosis): T-Cell Large Granular Lymphocytic Leukemia    Follow Up Instructions:   -Labs were reviewed and discussed with the patient.  -Follow up in 4 weeks    Has Treatment Plan Been Finalized? No    Current Labs:   Hospital Outpatient Visit on 06/24/2025   Component Date Value Ref Range Status    WBC 06/24/2025 2.8 (L)  4.3 - 11.1 K/uL Final    RBC 06/24/2025 4.05  4.05 - 5.2 M/uL Final    Hemoglobin 06/24/2025 12.3  11.7 - 15.4 g/dL Final    Hematocrit 06/24/2025 38.5  35.8 - 46.3 % Final    MCV 06/24/2025 95.1  82.0 - 102.0 FL Final    MCH 06/24/2025 30.4  26.1 - 32.9 PG Final    MCHC 06/24/2025 31.9  31.4 - 35.0 g/dL Final    RDW 06/24/2025 15.0 (H)  11.9 - 14.6 % Final    Platelets 06/24/2025 172  150 - 450 K/uL Final    MPV 06/24/2025 9.8  9.4 - 12.3 FL Final    nRBC 06/24/2025 0.00  0.0 - 0.2 K/uL Final    **Note: Absolute NRBC parameter is now reported with Hemogram**    Neutrophils % 06/24/2025 19.2 (L)  43.0 - 78.0 % Final    Lymphocytes % 06/24/2025 65.5 (H)  13.0 - 44.0 % Final    Monocytes % 06/24/2025 12.1 (H)  4.0 - 12.0 % Final    Eosinophils % 06/24/2025 2.1  0.5 - 7.8 % Final    Basophils % 06/24/2025 0.7  0.0 - 2.0 % Final    Immature Granulocytes % 06/24/2025 0.4  0.0 - 5.0 % Final    Neutrophils Absolute 06/24/2025 0.54 (L)  1.70 - 8.20 K/UL Final    Lymphocytes Absolute 06/24/2025 1.84  0.50 - 4.60 K/UL Final    Monocytes Absolute 06/24/2025 0.34  0.10 - 1.30 K/UL Final    Eosinophils Absolute 06/24/2025 0.06  0.00 - 0.80 K/UL Final    Basophils Absolute 06/24/2025 0.02  0.00 - 0.20 K/UL Final

## 2025-07-22 DIAGNOSIS — C91.Z0 T-CELL LARGE GRANULAR LYMPHOCYTIC LEUKEMIA (HCC): ICD-10-CM

## 2025-07-23 ENCOUNTER — HOSPITAL ENCOUNTER (OUTPATIENT)
Dept: LAB | Age: 80
Discharge: HOME | End: 2025-07-23
Payer: MEDICARE

## 2025-07-23 ENCOUNTER — OFFICE VISIT (OUTPATIENT)
Dept: ONCOLOGY | Age: 80
End: 2025-07-23
Payer: MEDICARE

## 2025-07-23 VITALS
OXYGEN SATURATION: 94 % | SYSTOLIC BLOOD PRESSURE: 149 MMHG | TEMPERATURE: 97.4 F | DIASTOLIC BLOOD PRESSURE: 71 MMHG | WEIGHT: 171 LBS | HEART RATE: 65 BPM | RESPIRATION RATE: 18 BRPM | BODY MASS INDEX: 30.3 KG/M2 | HEIGHT: 63 IN

## 2025-07-23 DIAGNOSIS — T45.1X5A CHEMOTHERAPY-INDUCED NEUTROPENIA: ICD-10-CM

## 2025-07-23 DIAGNOSIS — Z79.899 HIGH RISK MEDICATION USE: ICD-10-CM

## 2025-07-23 DIAGNOSIS — C91.Z0 T-CELL LARGE GRANULAR LYMPHOCYTIC LEUKEMIA (HCC): Primary | ICD-10-CM

## 2025-07-23 DIAGNOSIS — M05.79 RHEUMATOID ARTHRITIS INVOLVING MULTIPLE SITES WITH POSITIVE RHEUMATOID FACTOR (HCC): ICD-10-CM

## 2025-07-23 DIAGNOSIS — C91.Z0 T-CELL LARGE GRANULAR LYMPHOCYTIC LEUKEMIA (HCC): ICD-10-CM

## 2025-07-23 DIAGNOSIS — D70.1 CHEMOTHERAPY-INDUCED NEUTROPENIA: ICD-10-CM

## 2025-07-23 DIAGNOSIS — Z51.11 ENCOUNTER FOR ANTINEOPLASTIC CHEMOTHERAPY: ICD-10-CM

## 2025-07-23 LAB
ALBUMIN SERPL-MCNC: 3.6 G/DL (ref 3.2–4.6)
ALBUMIN/GLOB SERPL: 0.9 (ref 1–1.9)
ALP SERPL-CCNC: 61 U/L (ref 35–104)
ALT SERPL-CCNC: 14 U/L (ref 8–45)
ANION GAP SERPL CALC-SCNC: 10 MMOL/L (ref 7–16)
AST SERPL-CCNC: 21 U/L (ref 15–37)
BASOPHILS # BLD: 0.03 K/UL (ref 0–0.2)
BASOPHILS NFR BLD: 0.9 % (ref 0–2)
BILIRUB SERPL-MCNC: 0.4 MG/DL (ref 0–1.2)
BUN SERPL-MCNC: 17 MG/DL (ref 8–23)
CALCIUM SERPL-MCNC: 9.4 MG/DL (ref 8.8–10.2)
CHLORIDE SERPL-SCNC: 104 MMOL/L (ref 98–107)
CO2 SERPL-SCNC: 25 MMOL/L (ref 20–29)
CREAT SERPL-MCNC: 0.68 MG/DL (ref 0.6–1.1)
DIFFERENTIAL METHOD BLD: ABNORMAL
EOSINOPHIL # BLD: 0.06 K/UL (ref 0–0.8)
EOSINOPHIL NFR BLD: 1.9 % (ref 0.5–7.8)
ERYTHROCYTE [DISTWIDTH] IN BLOOD BY AUTOMATED COUNT: 14.7 % (ref 11.9–14.6)
GLOBULIN SER CALC-MCNC: 3.8 G/DL (ref 2.3–3.5)
GLUCOSE SERPL-MCNC: 88 MG/DL (ref 70–99)
HCT VFR BLD AUTO: 39.2 % (ref 35.8–46.3)
HGB BLD-MCNC: 12.9 G/DL (ref 11.7–15.4)
IMM GRANULOCYTES # BLD AUTO: 0 K/UL (ref 0–0.5)
IMM GRANULOCYTES NFR BLD AUTO: 0 % (ref 0–5)
LYMPHOCYTES # BLD: 1.96 K/UL (ref 0.5–4.6)
LYMPHOCYTES NFR BLD: 61.3 % (ref 13–44)
MCH RBC QN AUTO: 30.9 PG (ref 26.1–32.9)
MCHC RBC AUTO-ENTMCNC: 32.9 G/DL (ref 31.4–35)
MCV RBC AUTO: 94 FL (ref 82–102)
MONOCYTES # BLD: 0.43 K/UL (ref 0.1–1.3)
MONOCYTES NFR BLD: 13.4 % (ref 4–12)
NEUTS SEG # BLD: 0.72 K/UL (ref 1.7–8.2)
NEUTS SEG NFR BLD: 22.5 % (ref 43–78)
NRBC # BLD: 0 K/UL (ref 0–0.2)
PLATELET # BLD AUTO: 171 K/UL (ref 150–450)
PMV BLD AUTO: 9.7 FL (ref 9.4–12.3)
POTASSIUM SERPL-SCNC: 4.1 MMOL/L (ref 3.5–5.1)
PROT SERPL-MCNC: 7.3 G/DL (ref 6.3–8.2)
RBC # BLD AUTO: 4.17 M/UL (ref 4.05–5.2)
SODIUM SERPL-SCNC: 139 MMOL/L (ref 136–145)
WBC # BLD AUTO: 3.2 K/UL (ref 4.3–11.1)

## 2025-07-23 PROCEDURE — 85025 COMPLETE CBC W/AUTO DIFF WBC: CPT

## 2025-07-23 PROCEDURE — 36415 COLL VENOUS BLD VENIPUNCTURE: CPT

## 2025-07-23 PROCEDURE — 1159F MED LIST DOCD IN RCRD: CPT | Performed by: INTERNAL MEDICINE

## 2025-07-23 PROCEDURE — 99215 OFFICE O/P EST HI 40 MIN: CPT | Performed by: INTERNAL MEDICINE

## 2025-07-23 PROCEDURE — 1126F AMNT PAIN NOTED NONE PRSNT: CPT | Performed by: INTERNAL MEDICINE

## 2025-07-23 PROCEDURE — 80053 COMPREHEN METABOLIC PANEL: CPT

## 2025-07-23 PROCEDURE — 1123F ACP DISCUSS/DSCN MKR DOCD: CPT | Performed by: INTERNAL MEDICINE

## 2025-07-23 NOTE — PROGRESS NOTES
Most nights [facility] Hematology & Oncology: Office Visit Progress Note    Chief Complaint:    Neutropenia  T-cell LGL leukemia/Felty syndrome    History of Present Illness:  Referral Diagnosis: Other neutropenia; Seropositive rheumatoid arthritis     Referring Provider:  Cammy Maloney MD     Primary Care Provider: Jp Tinsley DO     Family History of Cancer/ Hematology Disorders: Mother with colon cancer     Presenting Symptoms:  Fatigue, fever, arthralgias     Ms. Jaquez is a 79 y.o. white female referred for neutropenia. PMH significant for MS (diagnosed in 1977- no treatment), seropositive rheumatoid arthritis, osteoporosis, hearing loss, and ovarian cancer s/p hysterectomy ~ 1997.      9/25/24: Rheumatology f/u for seropositive rheumatoid arthritis (diagnosed in 2020) and osteoporosis. Pt has not used DMARD therapy before. RA is being treated with prednisone (currently completing steroid pack which started at 60 Mg and found that any dose less than 30 Mg does not control symptoms). Prolia was previously used x 2 doses ending in 2022 for osteoporosis. Pt is counseled to consider restarting Prolia due to severity of her osteoporosis.   -Plan for methotrexate 10-12.5 Mg weekly plus daily folic acid based on labs.  -IM Depo-Medrol 40 Mg given once now. Consider PO pred if indicated for symptos  -Laboratory results showed WBC 1.7, neutrophils 14, lymphocytes 66, monocytes 17, ANC 0.2; sed rate 49, CRP WNL; hepatitis panel nonreactive; Mag, phos, vit D, calcium, PTH intact, incubated quantiferon WNL; CO2 19, glucose 103, globulin 4.1, A/G ratio 0.8 (Epic/Labs)      9/26/24: Dr. Maloney called pt's daughter regarding abnormal neutrophil count - , wbc 1.7; she was advised pt to go to er/UTC if fever lasting +1 hour or if infection to have treatment administered.  -Abdominal US ordered to check for splenomegaly (Scheduled for 10/3/24)   -Referred to [facility]  -Review of records indicates that pt's white

## 2025-07-23 NOTE — PATIENT INSTRUCTIONS
Patient Information from Today's Visit    The members of your [facility] are listed below:    Physician Provider: Job Hinds Medical Oncologist  Advanced Practice Clinician: Nat Eubanks NP  Registered Nurse: Laureen PANTOJA RN  Medical Assistant: Hossein NORMAN CMA  :Layla CASANOVA  Supportive Care Services: Byrno THOMAS LMSW    Diagnosis (Information Sheet Provided on Day of Diagnosis): T Cell Large Granular     Follow Up Instructions:   Labs reviewed   Continue Methotrexate as prescribed.  Continue to follow your rheumatologist a scheduled.  We will plan to see you back in 4 weeks. If you need anything prior please do not hesitate to call our office.  Has Treatment Plan Been Finalized? No    Current Labs:   Hospital Outpatient Visit on 07/23/2025   Component Date Value Ref Range Status    Sodium 07/23/2025 139  136 - 145 mmol/L Final    Potassium 07/23/2025 4.1  3.5 - 5.1 mmol/L Final    Chloride 07/23/2025 104  98 - 107 mmol/L Final    CO2 07/23/2025 25  20 - 29 mmol/L Final    Anion Gap 07/23/2025 10  7 - 16 mmol/L Final    Glucose 07/23/2025 88  70 - 99 mg/dL Final    Comment: <70 mg/dL Consistent with, but not fully diagnostic of hypoglycemia.  100 - 125 mg/dL Impaired fasting glucose/consistent with pre-diabetes mellitus.  > 126 mg/dl Fasting glucose consistent with overt diabetes mellitus      BUN 07/23/2025 17  8 - 23 MG/DL Final    Creatinine 07/23/2025 0.68  0.60 - 1.10 MG/DL Final    Est, Glom Filt Rate 07/23/2025 88  >60 ml/min/1.73m2 Final    Comment:    Pediatric calculator link: https://www.kidney.org/professionals/kdoqi/gfr_calculatorped     These results are not intended for use in patients <18 years of age.     eGFR results are calculated without a race factor using  the 2021 CKD-EPI equation. Careful clinical correlation is recommended, particularly when comparing to results calculated using previous equations.  The CKD-EPI equation is less accurate in patients with

## 2025-08-04 ENCOUNTER — OFFICE VISIT (OUTPATIENT)
Age: 80
End: 2025-08-04
Payer: MEDICARE

## 2025-08-04 DIAGNOSIS — Z79.891 ENCOUNTER FOR LONG-TERM OPIATE ANALGESIC USE: ICD-10-CM

## 2025-08-04 DIAGNOSIS — M05.79 RHEUMATOID ARTHRITIS INVOLVING MULTIPLE SITES WITH POSITIVE RHEUMATOID FACTOR (HCC): ICD-10-CM

## 2025-08-04 DIAGNOSIS — Z79.891 ENCOUNTER FOR LONG-TERM OPIATE ANALGESIC USE: Primary | ICD-10-CM

## 2025-08-04 PROCEDURE — 1123F ACP DISCUSS/DSCN MKR DOCD: CPT | Performed by: PHYSICIAN ASSISTANT

## 2025-08-04 PROCEDURE — 99214 OFFICE O/P EST MOD 30 MIN: CPT | Performed by: PHYSICIAN ASSISTANT

## 2025-08-04 RX ORDER — HYDROCODONE BITARTRATE AND ACETAMINOPHEN 7.5; 325 MG/1; MG/1
1 TABLET ORAL EVERY 6 HOURS PRN
Qty: 120 TABLET | Refills: 0 | Status: SHIPPED | OUTPATIENT
Start: 2025-08-12 | End: 2025-09-11

## 2025-08-04 RX ORDER — HYDROCODONE BITARTRATE AND ACETAMINOPHEN 7.5; 325 MG/1; MG/1
1 TABLET ORAL EVERY 6 HOURS PRN
Qty: 120 TABLET | Refills: 0 | Status: SHIPPED | OUTPATIENT
Start: 2025-09-11 | End: 2025-10-11

## 2025-08-04 ASSESSMENT — ENCOUNTER SYMPTOMS
EYES NEGATIVE: 1
ABDOMINAL PAIN: 0
ALLERGIC/IMMUNOLOGIC NEGATIVE: 1
SHORTNESS OF BREATH: 0

## 2025-08-06 LAB — DRUGS UR: NORMAL

## 2025-09-02 ENCOUNTER — TELEPHONE (OUTPATIENT)
Dept: ONCOLOGY | Age: 80
End: 2025-09-02

## 2025-09-02 ENCOUNTER — HOSPITAL ENCOUNTER (OUTPATIENT)
Dept: LAB | Age: 80
Discharge: HOME | End: 2025-09-02
Payer: MEDICARE

## 2025-09-02 ENCOUNTER — OFFICE VISIT (OUTPATIENT)
Dept: ONCOLOGY | Age: 80
End: 2025-09-02
Payer: MEDICARE

## 2025-09-02 VITALS
OXYGEN SATURATION: 98 % | BODY MASS INDEX: 30.41 KG/M2 | SYSTOLIC BLOOD PRESSURE: 159 MMHG | WEIGHT: 171.6 LBS | HEART RATE: 59 BPM | RESPIRATION RATE: 16 BRPM | DIASTOLIC BLOOD PRESSURE: 79 MMHG | TEMPERATURE: 98.6 F | HEIGHT: 63 IN

## 2025-09-02 DIAGNOSIS — K21.9 CHRONIC GERD: ICD-10-CM

## 2025-09-02 DIAGNOSIS — Z79.899 HIGH RISK MEDICATION USE: ICD-10-CM

## 2025-09-02 DIAGNOSIS — C91.Z0 T-CELL LARGE GRANULAR LYMPHOCYTIC LEUKEMIA (HCC): Primary | ICD-10-CM

## 2025-09-02 DIAGNOSIS — E55.9 VITAMIN D DEFICIENCY: ICD-10-CM

## 2025-09-02 DIAGNOSIS — C91.Z0 T-CELL LARGE GRANULAR LYMPHOCYTIC LEUKEMIA (HCC): ICD-10-CM

## 2025-09-02 LAB
ALBUMIN SERPL-MCNC: 3.4 G/DL (ref 3.2–4.6)
ALBUMIN/GLOB SERPL: 0.9 (ref 1–1.9)
ALP SERPL-CCNC: 49 U/L (ref 35–104)
ALT SERPL-CCNC: 13 U/L (ref 8–45)
ANION GAP SERPL CALC-SCNC: 11 MMOL/L (ref 7–16)
AST SERPL-CCNC: 20 U/L (ref 15–37)
BASOPHILS # BLD: 0.03 K/UL (ref 0–0.2)
BASOPHILS NFR BLD: 1 % (ref 0–2)
BILIRUB SERPL-MCNC: 0.4 MG/DL (ref 0–1.2)
BUN SERPL-MCNC: 13 MG/DL (ref 8–23)
CALCIUM SERPL-MCNC: 9.4 MG/DL (ref 8.8–10.2)
CHLORIDE SERPL-SCNC: 104 MMOL/L (ref 98–107)
CO2 SERPL-SCNC: 24 MMOL/L (ref 20–29)
CREAT SERPL-MCNC: 0.6 MG/DL (ref 0.6–1.1)
DIFFERENTIAL METHOD BLD: ABNORMAL
EOSINOPHIL # BLD: 0.05 K/UL (ref 0–0.8)
EOSINOPHIL NFR BLD: 1.6 % (ref 0.5–7.8)
ERYTHROCYTE [DISTWIDTH] IN BLOOD BY AUTOMATED COUNT: 14.2 % (ref 11.9–14.6)
GLOBULIN SER CALC-MCNC: 3.9 G/DL (ref 2.3–3.5)
GLUCOSE SERPL-MCNC: 89 MG/DL (ref 70–99)
HCT VFR BLD AUTO: 40 % (ref 35.8–46.3)
HGB BLD-MCNC: 12.9 G/DL (ref 11.7–15.4)
IMM GRANULOCYTES # BLD AUTO: 0 K/UL (ref 0–0.5)
IMM GRANULOCYTES NFR BLD AUTO: 0 % (ref 0–5)
LYMPHOCYTES # BLD: 2.14 K/UL (ref 0.5–4.6)
LYMPHOCYTES NFR BLD: 70.2 % (ref 13–44)
MCH RBC QN AUTO: 30.6 PG (ref 26.1–32.9)
MCHC RBC AUTO-ENTMCNC: 32.3 G/DL (ref 31.4–35)
MCV RBC AUTO: 95 FL (ref 82–102)
MONOCYTES # BLD: 0.29 K/UL (ref 0.1–1.3)
MONOCYTES NFR BLD: 9.5 % (ref 4–12)
NEUTS SEG # BLD: 0.54 K/UL (ref 1.7–8.2)
NEUTS SEG NFR BLD: 17.7 % (ref 43–78)
NRBC # BLD: 0 K/UL (ref 0–0.2)
PLATELET # BLD AUTO: 151 K/UL (ref 150–450)
PMV BLD AUTO: 10.1 FL (ref 9.4–12.3)
POTASSIUM SERPL-SCNC: 4.2 MMOL/L (ref 3.5–5.1)
PROT SERPL-MCNC: 7.2 G/DL (ref 6.3–8.2)
RBC # BLD AUTO: 4.21 M/UL (ref 4.05–5.2)
SODIUM SERPL-SCNC: 139 MMOL/L (ref 136–145)
WBC # BLD AUTO: 3.1 K/UL (ref 4.3–11.1)

## 2025-09-02 PROCEDURE — 85025 COMPLETE CBC W/AUTO DIFF WBC: CPT

## 2025-09-02 PROCEDURE — 99215 OFFICE O/P EST HI 40 MIN: CPT | Performed by: INTERNAL MEDICINE

## 2025-09-02 PROCEDURE — 36415 COLL VENOUS BLD VENIPUNCTURE: CPT

## 2025-09-02 PROCEDURE — 1123F ACP DISCUSS/DSCN MKR DOCD: CPT | Performed by: INTERNAL MEDICINE

## 2025-09-02 PROCEDURE — 1126F AMNT PAIN NOTED NONE PRSNT: CPT | Performed by: INTERNAL MEDICINE

## 2025-09-02 PROCEDURE — 80053 COMPREHEN METABOLIC PANEL: CPT
